# Patient Record
Sex: FEMALE | URBAN - METROPOLITAN AREA
[De-identification: names, ages, dates, MRNs, and addresses within clinical notes are randomized per-mention and may not be internally consistent; named-entity substitution may affect disease eponyms.]

---

## 2018-01-19 ENCOUNTER — OFFICE (OUTPATIENT)
Dept: URBAN - METROPOLITAN AREA CLINIC 66 | Facility: CLINIC | Age: 38
End: 2018-01-19

## 2018-01-19 VITALS
WEIGHT: 214 LBS | SYSTOLIC BLOOD PRESSURE: 127 MMHG | HEIGHT: 67 IN | DIASTOLIC BLOOD PRESSURE: 85 MMHG | TEMPERATURE: 97.8 F | HEART RATE: 90 BPM

## 2018-01-19 DIAGNOSIS — R74.8 ABNORMAL LEVELS OF OTHER SERUM ENZYMES: ICD-10-CM

## 2018-01-19 DIAGNOSIS — L29.9 PRURITUS, UNSPECIFIED: ICD-10-CM

## 2018-01-19 DIAGNOSIS — J40 BRONCHITIS, NOT SPECIFIED AS ACUTE OR CHRONIC: ICD-10-CM

## 2018-01-19 PROCEDURE — 99203 OFFICE O/P NEW LOW 30 MIN: CPT

## 2018-01-19 RX ORDER — AZITHROMYCIN 500 MG/1
500 TABLET, FILM COATED ORAL
Qty: 3 | Refills: 0 | Status: COMPLETED
Start: 2018-01-19 | End: 2018-08-17

## 2018-02-08 ENCOUNTER — OFFICE (OUTPATIENT)
Dept: URBAN - METROPOLITAN AREA CLINIC 66 | Facility: CLINIC | Age: 38
End: 2018-02-08

## 2018-02-08 VITALS
DIASTOLIC BLOOD PRESSURE: 84 MMHG | WEIGHT: 212 LBS | HEART RATE: 86 BPM | SYSTOLIC BLOOD PRESSURE: 133 MMHG | HEIGHT: 67 IN | TEMPERATURE: 98 F

## 2018-02-08 DIAGNOSIS — K76.0 FATTY (CHANGE OF) LIVER, NOT ELSEWHERE CLASSIFIED: ICD-10-CM

## 2018-02-08 DIAGNOSIS — L29.9 PRURITUS, UNSPECIFIED: ICD-10-CM

## 2018-02-08 DIAGNOSIS — E66.9 OBESITY, UNSPECIFIED: ICD-10-CM

## 2018-02-08 PROCEDURE — 99213 OFFICE O/P EST LOW 20 MIN: CPT

## 2018-08-17 ENCOUNTER — OFFICE (OUTPATIENT)
Dept: URBAN - METROPOLITAN AREA CLINIC 66 | Facility: CLINIC | Age: 38
End: 2018-08-17
Payer: COMMERCIAL

## 2018-08-17 VITALS
DIASTOLIC BLOOD PRESSURE: 80 MMHG | SYSTOLIC BLOOD PRESSURE: 118 MMHG | WEIGHT: 215 LBS | HEART RATE: 72 BPM | HEIGHT: 67 IN

## 2018-08-17 DIAGNOSIS — E66.9 OBESITY, UNSPECIFIED: ICD-10-CM

## 2018-08-17 DIAGNOSIS — R74.8 ABNORMAL LEVELS OF OTHER SERUM ENZYMES: ICD-10-CM

## 2018-08-17 PROCEDURE — 99213 OFFICE O/P EST LOW 20 MIN: CPT

## 2019-02-27 ENCOUNTER — OFFICE (OUTPATIENT)
Dept: URBAN - METROPOLITAN AREA CLINIC 66 | Facility: CLINIC | Age: 39
End: 2019-02-27

## 2019-02-27 VITALS
HEART RATE: 89 BPM | SYSTOLIC BLOOD PRESSURE: 108 MMHG | DIASTOLIC BLOOD PRESSURE: 75 MMHG | HEIGHT: 67 IN | WEIGHT: 224 LBS

## 2019-02-27 DIAGNOSIS — E66.9 OBESITY, UNSPECIFIED: ICD-10-CM

## 2019-02-27 DIAGNOSIS — R74.8 ABNORMAL LEVELS OF OTHER SERUM ENZYMES: ICD-10-CM

## 2019-02-27 DIAGNOSIS — R11.0 NAUSEA: ICD-10-CM

## 2019-02-27 DIAGNOSIS — R19.7 DIARRHEA, UNSPECIFIED: ICD-10-CM

## 2019-02-27 DIAGNOSIS — R14.0 ABDOMINAL DISTENSION (GASEOUS): ICD-10-CM

## 2019-02-27 PROCEDURE — 99213 OFFICE O/P EST LOW 20 MIN: CPT

## 2019-07-25 ENCOUNTER — OFFICE VISIT (OUTPATIENT)
Dept: BARIATRICS/WEIGHT MGMT | Facility: CLINIC | Age: 39
End: 2019-07-25

## 2019-07-25 ENCOUNTER — DOCUMENTATION (OUTPATIENT)
Dept: BARIATRICS/WEIGHT MGMT | Facility: CLINIC | Age: 39
End: 2019-07-25

## 2019-07-25 VITALS
TEMPERATURE: 97.9 F | HEIGHT: 68 IN | DIASTOLIC BLOOD PRESSURE: 78 MMHG | RESPIRATION RATE: 18 BRPM | BODY MASS INDEX: 34.78 KG/M2 | HEART RATE: 85 BPM | SYSTOLIC BLOOD PRESSURE: 118 MMHG | OXYGEN SATURATION: 99 % | WEIGHT: 229.5 LBS

## 2019-07-25 DIAGNOSIS — E66.9 OBESITY, CLASS II, BMI 35-39.9: ICD-10-CM

## 2019-07-25 DIAGNOSIS — I10 HYPERTENSION, UNSPECIFIED TYPE: Primary | ICD-10-CM

## 2019-07-25 DIAGNOSIS — E78.5 HYPERLIPIDEMIA, UNSPECIFIED HYPERLIPIDEMIA TYPE: ICD-10-CM

## 2019-07-25 DIAGNOSIS — K76.0 FATTY LIVER: ICD-10-CM

## 2019-07-25 DIAGNOSIS — R10.13 DYSPEPSIA: ICD-10-CM

## 2019-07-25 DIAGNOSIS — R53.83 FATIGUE, UNSPECIFIED TYPE: ICD-10-CM

## 2019-07-25 PROCEDURE — 99204 OFFICE O/P NEW MOD 45 MIN: CPT | Performed by: PHYSICIAN ASSISTANT

## 2019-07-25 RX ORDER — GABAPENTIN 300 MG/1
300 CAPSULE ORAL DAILY
Refills: 0 | COMMUNITY
Start: 2019-06-27 | End: 2022-11-07

## 2019-07-25 RX ORDER — MEDROXYPROGESTERONE ACETATE 150 MG/ML
INJECTION, SUSPENSION INTRAMUSCULAR
COMMUNITY
Start: 2019-06-06 | End: 2020-02-06 | Stop reason: HOSPADM

## 2019-07-25 RX ORDER — SIMVASTATIN 20 MG
20 TABLET ORAL NIGHTLY
Refills: 1 | COMMUNITY
Start: 2019-07-12

## 2019-07-25 RX ORDER — AMLODIPINE BESYLATE 5 MG/1
5 TABLET ORAL DAILY
Refills: 0 | COMMUNITY
Start: 2019-07-19 | End: 2021-03-10

## 2019-07-25 RX ORDER — SPIRONOLACTONE 50 MG/1
150 TABLET, FILM COATED ORAL 2 TIMES DAILY
COMMUNITY
End: 2021-03-10

## 2019-07-25 RX ORDER — NORETHINDRONE ACETATE AND ETHINYL ESTRADIOL AND FERROUS FUMARATE 1MG-20(21)
1 KIT ORAL DAILY
Refills: 3 | COMMUNITY
Start: 2019-07-10 | End: 2020-02-02

## 2019-07-26 LAB
ALBUMIN SERPL-MCNC: 4.4 G/DL (ref 3.5–5.5)
ALBUMIN/GLOB SERPL: 1.4 {RATIO} (ref 1.2–2.2)
ALP SERPL-CCNC: 78 IU/L (ref 39–117)
ALT SERPL-CCNC: 16 IU/L (ref 0–32)
AST SERPL-CCNC: 19 IU/L (ref 0–40)
BASOPHILS # BLD AUTO: 0 X10E3/UL (ref 0–0.2)
BASOPHILS NFR BLD AUTO: 0 %
BILIRUB SERPL-MCNC: 0.6 MG/DL (ref 0–1.2)
BUN SERPL-MCNC: 16 MG/DL (ref 6–20)
BUN/CREAT SERPL: 20 (ref 9–23)
CALCIUM SERPL-MCNC: 10 MG/DL (ref 8.7–10.2)
CHLORIDE SERPL-SCNC: 98 MMOL/L (ref 96–106)
CHOLEST SERPL-MCNC: 223 MG/DL (ref 100–199)
CO2 SERPL-SCNC: 22 MMOL/L (ref 20–29)
CREAT SERPL-MCNC: 0.81 MG/DL (ref 0.57–1)
EOSINOPHIL # BLD AUTO: 0.1 X10E3/UL (ref 0–0.4)
EOSINOPHIL NFR BLD AUTO: 1 %
ERYTHROCYTE [DISTWIDTH] IN BLOOD BY AUTOMATED COUNT: 13 % (ref 12.3–15.4)
GLOBULIN SER CALC-MCNC: 3.1 G/DL (ref 1.5–4.5)
GLUCOSE SERPL-MCNC: 81 MG/DL (ref 65–99)
HBA1C MFR BLD: 5.3 % (ref 4.8–5.6)
HCT VFR BLD AUTO: 42.4 % (ref 34–46.6)
HDLC SERPL-MCNC: 75 MG/DL
HGB BLD-MCNC: 14.1 G/DL (ref 11.1–15.9)
IMM GRANULOCYTES # BLD AUTO: 0 X10E3/UL (ref 0–0.1)
IMM GRANULOCYTES NFR BLD AUTO: 0 %
LDLC SERPL CALC-MCNC: 109 MG/DL (ref 0–99)
LYMPHOCYTES # BLD AUTO: 3.2 X10E3/UL (ref 0.7–3.1)
LYMPHOCYTES NFR BLD AUTO: 28 %
MCH RBC QN AUTO: 30.8 PG (ref 26.6–33)
MCHC RBC AUTO-ENTMCNC: 33.3 G/DL (ref 31.5–35.7)
MCV RBC AUTO: 93 FL (ref 79–97)
MONOCYTES # BLD AUTO: 0.7 X10E3/UL (ref 0.1–0.9)
MONOCYTES NFR BLD AUTO: 6 %
NEUTROPHILS # BLD AUTO: 7.1 X10E3/UL (ref 1.4–7)
NEUTROPHILS NFR BLD AUTO: 65 %
PLATELET # BLD AUTO: 414 X10E3/UL (ref 150–450)
POTASSIUM SERPL-SCNC: 4.8 MMOL/L (ref 3.5–5.2)
PROT SERPL-MCNC: 7.5 G/DL (ref 6–8.5)
RBC # BLD AUTO: 4.58 X10E6/UL (ref 3.77–5.28)
SODIUM SERPL-SCNC: 136 MMOL/L (ref 134–144)
TRIGL SERPL-MCNC: 197 MG/DL (ref 0–149)
TSH SERPL DL<=0.005 MIU/L-ACNC: 1.09 UIU/ML (ref 0.45–4.5)
VLDLC SERPL CALC-MCNC: 39 MG/DL (ref 5–40)
WBC # BLD AUTO: 11.2 X10E3/UL (ref 3.4–10.8)

## 2019-08-01 NOTE — PROGRESS NOTES
"Weight Loss Surgery  Presurgical Nutrition Assessment     Mariel Fox  07/25/2019  [id number]  [id number]  1980  female    Surgery desired: Sleeve Gastrectomy     Ht 171.5 cm (67.5\")    Wt 104 kg (229 lb 8 oz)   Past Medical History:   Diagnosis Date   • Anxiety and depression    • Chronic back pain     last steroid injection 2017   • Dyspepsia    • Dyspnea on exertion    • Endometriosis    • Fatigue    • Fatty liver     follows w/ GI ([place]), Mom w/ hx NUNES Cirrhosis, denies prior liver bx   • Hyperlipidemia    • Hypertension    • Melanoma (CMS/HCC) 2005    (R) forearm, resected, no subsequet tx, follows w/ derm   • MTHFR mutation (CMS/HCC)     hx miscarriage x 2, no hx DVT/PE, takes ASA81 mg qod   • Obesity    • PCOS (polycystic ovarian syndrome)     on Spironolactone   • Psoriatic arthritis (CMS/HCC)     follows w/ Rheumatology ([place]), on Enbrel/Gabapentin + prn Ibupfroen     Past Surgical History:   Procedure Laterality Date   • SALPINGECTOMY Left 2013    ectopic pregnancy   • TARSAL TUNNEL RELEASE Right 2015    (R) foot   • TONSILLECTOMY  1990     Allergies   Allergen Reactions   • Adhesive Tape Itching and Rash       Current Outpatient Medications:   •  amLODIPine (NORVASC) 5 MG tablet, Take 5 mg by mouth Daily., Disp: , Rfl: 0  •  ENBREL SURECLICK 50 MG/ML solution auto-injector, , Disp: , Rfl:   •  gabapentin (NEURONTIN) 300 MG capsule, Take 300 mg by mouth 3 (Three) Times a Day., Disp: , Rfl: 0  •  JUNEL FE 1/20 1-20 MG-MCG per tablet, Take 1 tablet by mouth Daily., Disp: , Rfl: 3  •  Liraglutide -Weight Management (SAXENDA) 18 MG/3ML solution pen-injector, , Disp: , Rfl:   •  Multiple Vitamins-Minerals (MULTIVITAMIN ADULT PO), Take  by mouth., Disp: , Rfl:   •  sertraline (ZOLOFT) 100 MG tablet, TAKE 1 & 1/2 TABLETS EVERY DAY, Disp: , Rfl: 2  •  simvastatin (ZOCOR) 20 MG tablet, Take 20 mg by mouth every night at bedtime., Disp: , Rfl: 1  •  spironolactone (ALDACTONE) 50 MG tablet, Take " 150 mg by mouth Daily., Disp: , Rfl:       Nutrition Assessment    Estimated energy needs: 1900    Estimated calories for weight loss: 1500    IBW (Pounds):  160      Excess body weight (Pounds): 69       Nutrition Recall  24 Hour recall: (B) (L) (D) -  Reviewed and discussed with patient       Exercise  intermittently      Education    Provided manual for Sleeve Gastrectomy and reviewed necessary vitamin and protein supplementation for surgery.     Provided follow-up options for support, including contact information for dietitians here, if desired.  Web-based support information and apps for smart phones and computers given.  Noted that monthly support group is offered at this clinic, and that support is associated with weight loss.    Recommend that team proceed with surgery and follow per protocol.      Nutrition Goals   Dietary Guidelines per manual  Protein goal:  grams per day     Exercise Goals  Continue current exercise routine   Add 15-30 minutes of activity per day as tolerated        Jazmyne Senior RD  07/25/2019  2:57 PM

## 2019-08-08 ENCOUNTER — CONSULT (OUTPATIENT)
Dept: CARDIOLOGY | Facility: CLINIC | Age: 39
End: 2019-08-08

## 2019-08-08 VITALS
WEIGHT: 231 LBS | HEIGHT: 67 IN | BODY MASS INDEX: 36.26 KG/M2 | HEART RATE: 75 BPM | DIASTOLIC BLOOD PRESSURE: 80 MMHG | SYSTOLIC BLOOD PRESSURE: 122 MMHG

## 2019-08-08 DIAGNOSIS — I10 ESSENTIAL HYPERTENSION: Primary | ICD-10-CM

## 2019-08-08 DIAGNOSIS — E78.2 MIXED HYPERLIPIDEMIA: ICD-10-CM

## 2019-08-08 PROCEDURE — 99243 OFF/OP CNSLTJ NEW/EST LOW 30: CPT | Performed by: PHYSICIAN ASSISTANT

## 2019-08-08 PROCEDURE — 93000 ELECTROCARDIOGRAM COMPLETE: CPT | Performed by: PHYSICIAN ASSISTANT

## 2019-08-08 RX ORDER — DICLOFENAC SODIUM 75 MG/1
75 TABLET, DELAYED RELEASE ORAL 2 TIMES DAILY
COMMUNITY
End: 2020-02-02

## 2019-08-08 RX ORDER — CETIRIZINE HYDROCHLORIDE 10 MG/1
10 TABLET ORAL DAILY
COMMUNITY
End: 2020-02-02

## 2019-08-08 NOTE — PROGRESS NOTES
[facility]  INITIAL OFFICE CONSULT      Mariel Fox  1980  PCP: Lauro Coronel APRN    SUBJECTIVE:   Mariel Fox is a 39 y.o. female seen for a consultation visit regarding the following:     Chief Complaint:   Chief Complaint   Patient presents with   • Pre-op Exam          Consultation is requested by LAURA Mart for evaluation of Pre-op Exam        History:  Pleasant 39-year-old female presents today for consultation regarding preop evaluation for gastric sleeve.  Patient reports is been doing well she has no chest pain or chest tightness suggesting his pectoris.  She reports that she has a history of hypertension dyslipidemia which is under control.  She denies any previous cardiac history.  She denies any heart failure symptoms such as orthopnea, PND, peripheral edema.  She states she tries exercise walking in her neighborhood and she said no symptoms during this time.  She denies any palpitations dizziness near syncope or syncope.  She is scheduled to undergo further evaluation and consideration for gastric sleeve.    Cardiac PMH: (Old records have been reviewed and summarized below)  1. Normal EKG      Past Medical History, Past Surgical History, Family history, Social History, and Medications were all reviewed with the patient today and updated as necessary.     Current Outpatient Medications   Medication Sig Dispense Refill   • amLODIPine (NORVASC) 5 MG tablet Take 5 mg by mouth Daily.  0   • cetirizine (zyrTEC) 10 MG tablet Take 10 mg by mouth Daily.     • diclofenac (VOLTAREN) 75 MG EC tablet Take 75 mg by mouth 2 (Two) Times a Day.     • ENBREL SURECLICK 50 MG/ML solution auto-injector      • gabapentin (NEURONTIN) 300 MG capsule Take 300 mg by mouth 3 (Three) Times a Day.  0   • JUNEL FE 1/20 1-20 MG-MCG per tablet Take 1 tablet by mouth Daily.  3   • Liraglutide -Weight Management (SAXENDA) 18 MG/3ML solution pen-injector      • Multiple  Vitamins-Minerals (MULTIVITAMIN ADULT PO) Take  by mouth.     • sertraline (ZOLOFT) 100 MG tablet TAKE 1 & 1/2 TABLETS EVERY DAY  2   • simvastatin (ZOCOR) 20 MG tablet Take 20 mg by mouth every night at bedtime.  1   • spironolactone (ALDACTONE) 50 MG tablet Take 150 mg by mouth Daily.       No current facility-administered medications for this visit.      Allergies   Allergen Reactions   • Adhesive Tape Itching and Rash         Past Medical History:   Diagnosis Date   • Anxiety and depression    • Chronic back pain     last steroid injection 2017   • Dyspepsia    • Dyspnea on exertion    • Endometriosis    • Fatigue    • Fatty liver     follows w/ GI ([place]), Mom w/ hx NUNES Cirrhosis, denies prior liver bx   • Hyperlipidemia    • Hypertension    • Melanoma (CMS/HCC) 2005    (R) forearm, resected, no subsequet tx, follows w/ derm   • Melanoma (CMS/HCC)    • MTHFR mutation (CMS/HCC)     hx miscarriage x 2, no hx DVT/PE, takes ASA81 mg qod   • Obesity    • PCOS (polycystic ovarian syndrome)     on Spironolactone   • Psoriatic arthritis (CMS/HCC)     follows w/ Rheumatology ([place]), on Enbrel/Gabapentin + prn Ibupfroen     Past Surgical History:   Procedure Laterality Date   • SALPINGECTOMY Left 2013    ectopic pregnancy   • TARSAL TUNNEL RELEASE Right 2015    (R) foot   • TONSILLECTOMY  1990     Family History   Problem Relation Age of Onset   • Hypertension Mother    • Melanoma Mother 45   • Cirrhosis Mother         NUNES   • Hypertension Father    • Squamous cell carcinoma Father 60   • Hypertension Brother    • Stroke Maternal Grandmother    • Heart attack Maternal Grandfather    • Lung cancer Paternal Aunt      Social History     Tobacco Use   • Smoking status: Never Smoker   • Smokeless tobacco: Never Used   Substance Use Topics   • Alcohol use: No     Frequency: Never       ROS:  Review of Symptoms:  General: no recent weight loss/gain, weakness or fatigue  Skin: no rashes, lumps, or other skin  "changes  HEENT: no dizziness, lightheadedness, or vision changes  Respiratory: no cough or hemoptysis  Cardiovascular: no palpitations, and tachycardia  Gastrointestinal: no black/tarry stools or diarrhea  Urinary: no change in frequency or urgency  Peripheral Vascular: no claudication or leg cramps  Musculoskeletal: no muscle or joint pain/stiffness  Psychiatric: no depression or excessive stress  Neurological: no sensory or motor loss, no syncope  Hematologic: no anemia, easy bruising or bleeding  Endocrine: no thyroid problems, nor heat or cold intolerance         PHYSICAL EXAM:   /80 (BP Location: Right arm)   Pulse 75   Ht 170.2 cm (67\")   Wt 105 kg (231 lb)   BMI 36.18 kg/m²      Wt Readings from Last 5 Encounters:   08/08/19 105 kg (231 lb)   07/25/19 104 kg (229 lb 8 oz)     BP Readings from Last 5 Encounters:   08/08/19 122/80   07/25/19 118/78       General-Well Nourished, Well developed  Eyes - PERRLA  Neck- supple, No mass  CV- regular rate and rhythm, no MRG  Lung- clear bilaterally  Abd- soft, +BS  Musc/skel - Norm strength and range of motion  Skin- warm and dry  Neuro - Alert & Oriented x 3, appropriate mood.    Medical problems and test results were reviewed with the patient today.     Results for orders placed or performed in visit on 07/25/19   Comprehensive Metabolic Panel   Result Value Ref Range    Glucose 81 65 - 99 mg/dL    BUN 16 6 - 20 mg/dL    Creatinine 0.81 0.57 - 1.00 mg/dL    eGFR Non African Am 92 >59 mL/min/1.73    eGFR African Am 107 >59 mL/min/1.73    BUN/Creatinine Ratio 20 9 - 23    Sodium 136 134 - 144 mmol/L    Potassium 4.8 3.5 - 5.2 mmol/L    Chloride 98 96 - 106 mmol/L    Total CO2 22 20 - 29 mmol/L    Calcium 10.0 8.7 - 10.2 mg/dL    Total Protein 7.5 6.0 - 8.5 g/dL    Albumin 4.4 3.5 - 5.5 g/dL    Globulin 3.1 1.5 - 4.5 g/dL    A/G Ratio 1.4 1.2 - 2.2    Total Bilirubin 0.6 0.0 - 1.2 mg/dL    Alkaline Phosphatase 78 39 - 117 IU/L    AST (SGOT) 19 0 - 40 IU/L    " ALT (SGPT) 16 0 - 32 IU/L   Hemoglobin A1c   Result Value Ref Range    Hemoglobin A1C 5.3 4.8 - 5.6 %   Lipid Panel   Result Value Ref Range    Total Cholesterol 223 (H) 100 - 199 mg/dL    Triglycerides 197 (H) 0 - 149 mg/dL    HDL Cholesterol 75 >39 mg/dL    VLDL Cholesterol 39 5 - 40 mg/dL    LDL Cholesterol  109 (H) 0 - 99 mg/dL   TSH   Result Value Ref Range    TSH 1.090 0.450 - 4.500 uIU/mL   CBC & Differential   Result Value Ref Range    WBC 11.2 (H) 3.4 - 10.8 x10E3/uL    RBC 4.58 3.77 - 5.28 x10E6/uL    Hemoglobin 14.1 11.1 - 15.9 g/dL    Hematocrit 42.4 34.0 - 46.6 %    MCV 93 79 - 97 fL    MCH 30.8 26.6 - 33.0 pg    MCHC 33.3 31.5 - 35.7 g/dL    RDW 13.0 12.3 - 15.4 %    Platelets 414 150 - 450 x10E3/uL    Neutrophil Rel % 65 Not Estab. %    Lymphocyte Rel % 28 Not Estab. %    Monocyte Rel % 6 Not Estab. %    Eosinophil Rel % 1 Not Estab. %    Basophil Rel % 0 Not Estab. %    Neutrophils Absolute 7.1 (H) 1.4 - 7.0 x10E3/uL    Lymphocytes Absolute 3.2 (H) 0.7 - 3.1 x10E3/uL    Monocytes Absolute 0.7 0.1 - 0.9 x10E3/uL    Eosinophils Absolute 0.1 0.0 - 0.4 x10E3/uL    Basophils Absolute 0.0 0.0 - 0.2 x10E3/uL    Immature Granulocyte Rel % 0 Not Estab. %    Immature Grans Absolute 0.0 0.0 - 0.1 x10E3/uL         Lab Results   Component Value Date    HDL 75 07/25/2019     (H) 07/25/2019    VLDL 39 07/25/2019       EKG:  (EKG/Tracing has been independently visualized by me and summarized below)      ECG 12 Lead  Date/Time: 8/8/2019 3:25 PM  Performed by: Jose Acosta PA  Authorized by: Jose Acosta PA   Comparison: not compared with previous ECG   Rhythm: sinus rhythm  Rate: normal  Conduction: conduction normal  ST Segments: ST segments normal  QRS axis: normal  Other: no other findings    Clinical impression: normal ECG          ASSESSMENT   1. Pre op evaluation:   Plan for upcoming gastric sleeve surgery.  The patient has no symptoms of angina or heart failure symptoms.  She has an EKG is  normal with no significant abnormalities.  2. HTN:  Controlled on Norvasc. Limit sodium, diet and exercise.   3. HLD:  Statin. Continue diet with target  or less.   4. Obesity.     PLAN  · The patient has no symptoms of angina or heart failure with normal EKG today.  She will continue focus on risk factors for coronary disease including control of hypertension dyslipidemia.  She is considered standard cardia vascular risk to pursue gastric sleeve surgery.         Cardiology/Electrophysiology  08/08/19  3:16 PM  Will Dave RODRIGUEZ

## 2019-10-09 ENCOUNTER — OFFICE (OUTPATIENT)
Dept: URBAN - METROPOLITAN AREA CLINIC 66 | Facility: CLINIC | Age: 39
End: 2019-10-09

## 2019-10-09 VITALS
HEIGHT: 67 IN | SYSTOLIC BLOOD PRESSURE: 119 MMHG | HEART RATE: 73 BPM | DIASTOLIC BLOOD PRESSURE: 81 MMHG | WEIGHT: 242 LBS

## 2019-10-09 DIAGNOSIS — R14.0 ABDOMINAL DISTENSION (GASEOUS): ICD-10-CM

## 2019-10-09 DIAGNOSIS — E66.9 OBESITY, UNSPECIFIED: ICD-10-CM

## 2019-10-09 DIAGNOSIS — I10 ESSENTIAL (PRIMARY) HYPERTENSION: ICD-10-CM

## 2019-10-09 DIAGNOSIS — K76.0 FATTY (CHANGE OF) LIVER, NOT ELSEWHERE CLASSIFIED: ICD-10-CM

## 2019-10-09 PROCEDURE — 99214 OFFICE O/P EST MOD 30 MIN: CPT

## 2020-01-06 DIAGNOSIS — R53.83 FATIGUE, UNSPECIFIED TYPE: Primary | ICD-10-CM

## 2020-01-06 DIAGNOSIS — R06.00 DYSPNEA, UNSPECIFIED TYPE: ICD-10-CM

## 2020-01-06 DIAGNOSIS — R53.83 FATIGUE, UNSPECIFIED TYPE: ICD-10-CM

## 2020-01-13 ENCOUNTER — CONSULT (OUTPATIENT)
Dept: BARIATRICS/WEIGHT MGMT | Facility: CLINIC | Age: 40
End: 2020-01-13

## 2020-01-13 VITALS
BODY MASS INDEX: 37.81 KG/M2 | OXYGEN SATURATION: 99 % | HEART RATE: 90 BPM | RESPIRATION RATE: 18 BRPM | HEIGHT: 68 IN | SYSTOLIC BLOOD PRESSURE: 122 MMHG | WEIGHT: 249.5 LBS | TEMPERATURE: 96.6 F | DIASTOLIC BLOOD PRESSURE: 78 MMHG

## 2020-01-13 DIAGNOSIS — R53.83 FATIGUE, UNSPECIFIED TYPE: Primary | ICD-10-CM

## 2020-01-13 DIAGNOSIS — K76.0 FATTY LIVER: ICD-10-CM

## 2020-01-13 DIAGNOSIS — I10 HYPERTENSION, UNSPECIFIED TYPE: ICD-10-CM

## 2020-01-13 DIAGNOSIS — E66.9 OBESITY, CLASS II, BMI 35-39.9: ICD-10-CM

## 2020-01-13 DIAGNOSIS — E78.5 HYPERLIPIDEMIA, UNSPECIFIED HYPERLIPIDEMIA TYPE: ICD-10-CM

## 2020-01-13 PROBLEM — E66.812 OBESITY, CLASS II, BMI 35-39.9: Status: ACTIVE | Noted: 2020-01-13

## 2020-01-13 PROCEDURE — 99215 OFFICE O/P EST HI 40 MIN: CPT | Performed by: SURGERY

## 2020-01-13 RX ORDER — SCOLOPAMINE TRANSDERMAL SYSTEM 1 MG/1
1 PATCH, EXTENDED RELEASE TRANSDERMAL ONCE
Status: CANCELLED | OUTPATIENT
Start: 2020-01-13 | End: 2020-01-13

## 2020-01-13 RX ORDER — ACETAMINOPHEN 500 MG
1000 TABLET ORAL ONCE
Status: CANCELLED | OUTPATIENT
Start: 2020-01-13 | End: 2020-01-13

## 2020-01-13 RX ORDER — CHLORHEXIDINE GLUCONATE 0.12 MG/ML
15 RINSE ORAL
Status: CANCELLED | OUTPATIENT
Start: 2020-01-13 | End: 2020-01-13

## 2020-01-13 RX ORDER — PANTOPRAZOLE SODIUM 40 MG/10ML
40 INJECTION, POWDER, LYOPHILIZED, FOR SOLUTION INTRAVENOUS ONCE
Status: CANCELLED | OUTPATIENT
Start: 2020-01-13 | End: 2020-01-13

## 2020-01-13 RX ORDER — GABAPENTIN 100 MG/1
600 CAPSULE ORAL ONCE
Status: CANCELLED | OUTPATIENT
Start: 2020-01-13 | End: 2020-01-13

## 2020-01-13 RX ORDER — SODIUM CHLORIDE 9 MG/ML
150 INJECTION, SOLUTION INTRAVENOUS CONTINUOUS
Status: CANCELLED | OUTPATIENT
Start: 2020-01-13

## 2020-01-13 NOTE — H&P
"[facility] BARIATRIC [facility]  [address]    [facility] [address] [phone]      Patient  Name:  Mariel Fox  :  1980      Date of Visit: 2020      Chief Complaint:  weight gain; unable to maintain weight loss    History of Present Illness:  Mariel Fox is a 39 y.o. female who presents today for evaluation, education and consultation regarding weight loss surgery.     Patient has been overweight for many years, with numerous failed dietary/weight loss attempts.  She now has obesity related comorbidities of HTN, HLD, fatty liver, back pain, PCOS and as such has decided to pursue weight loss surgery.    Psoriatic arthritis:  Off immunomodulators.  Melanoma in-situ: 14 years ago, s/p WLE on right arm.     Wants to go to [place] [facility] 3 weeks after surgery.  Aware of risks of VTE, leak in the first 6 weeks.    No personal or family hx of VTE or clotting d/o.  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  No liver, lung, heart, or renal disease    Review of data:    GURU: gabapentin  CBC: nl  CMP: nl  HP negative  Cardiac clearance: \"standard\" cardiac risk   Rheumatology statement: aware of her need to be off NSAIDS/immunomodulators/steroids  PCP statement re: MTHFR: 14 days Lovenox bid post op.  EKG: nl  CXR: nl      Last tobacco: n/a  Last NSAIDs: diclofenac 19.  Last ASA: 19.  Last steroids: 19 for hives on [place], no Enbrel x 1 week  Last hormones: Junel OCP, stopped 19.      Past Medical History:   Diagnosis Date   • Anxiety and depression    • Chronic back pain     last steroid injection    • Dyspepsia    • Dyspnea on exertion    • Endometriosis    • Fatigue    • Fatty liver     follows w/ GI ([place]), Mom w/ hx NUNES Cirrhosis, denies prior liver bx   • Hyperlipidemia    • Hypertension    • Melanoma in situ (CMS/HCC)     (R) forearm, resected, no subsequet tx, follows w/ derm   • MTHFR mutation (CMS/HCC) "     hx miscarriage x 2, no hx DVT/PE, takes ASA81 mg qod   • Obesity    • PCOS (polycystic ovarian syndrome)     on Spironolactone   • Psoriatic arthritis (CMS/HCC)     follows w/ Rheumatology ([place]), on Enbrel/Gabapentin + prn Ibupfroen     Past Surgical History:   Procedure Laterality Date   • D&C HYSTEROSCOPY      x 5   • SALPINGECTOMY Left 2013    ectopic pregnancy   • SKIN SURGERY      wide local excision right forearm melanoma in situ, also another for dysplastic nevus.   • TARSAL TUNNEL RELEASE Right 2015    (R) foot   • TONSILLECTOMY  1990       Allergies   Allergen Reactions   • Adhesive Tape Itching and Rash       Current Outpatient Medications:   •  amLODIPine (NORVASC) 5 MG tablet, Take 5 mg by mouth Daily., Disp: , Rfl: 0  •  ENBREL SURECLICK 50 MG/ML solution auto-injector, , Disp: , Rfl:   •  gabapentin (NEURONTIN) 300 MG capsule, Take 300 mg by mouth 3 (Three) Times a Day., Disp: , Rfl: 0  •  Multiple Vitamins-Minerals (MULTIVITAMIN ADULT PO), Take  by mouth., Disp: , Rfl:   •  sertraline (ZOLOFT) 100 MG tablet, TAKE 1 & 1/2 TABLETS EVERY DAY, Disp: , Rfl: 2  •  simvastatin (ZOCOR) 20 MG tablet, Take 20 mg by mouth every night at bedtime., Disp: , Rfl: 1  •  spironolactone (ALDACTONE) 50 MG tablet, Take 150 mg by mouth Daily., Disp: , Rfl:   •  cetirizine (zyrTEC) 10 MG tablet, Take 10 mg by mouth Daily., Disp: , Rfl:   •  diclofenac (VOLTAREN) 75 MG EC tablet, Take 75 mg by mouth 2 (Two) Times a Day., Disp: , Rfl:   •  JUNEL FE 1/20 1-20 MG-MCG per tablet, Take 1 tablet by mouth Daily., Disp: , Rfl: 3  •  Liraglutide -Weight Management (SAXENDA) 18 MG/3ML solution pen-injector, , Disp: , Rfl:     Social History     Socioeconomic History   • Marital status:      Spouse name: Not on file   • Number of children: Not on file   • Years of education: Not on file   • Highest education level: Not on file   Tobacco Use   • Smoking status: Never Smoker   • Smokeless tobacco: Never Used    Substance and Sexual Activity   • Alcohol use: No     Frequency: Never   • Drug use: No   Social History Narrative    Living in [place], [place] w/  and 4 children.  [facility] - [facility].  She is an occupational therapist and owner.       Family History   Problem Relation Age of Onset   • Hypertension Mother    • Melanoma Mother 45   • Cirrhosis Mother         NUNES   • Hypertension Father    • Squamous cell carcinoma Father 60   • Hypertension Brother    • Stroke Maternal Grandmother    • Heart attack Maternal Grandfather    • Lung cancer Paternal Aunt        Review of Systems   Constitutional: Positive for fatigue and unexpected weight gain. Negative for chills, diaphoresis, fever and unexpected weight loss.   HENT: Negative for congestion and facial swelling.    Eyes: Negative for blurred vision, double vision and discharge.   Respiratory: Negative for chest tightness, shortness of breath and stridor.    Cardiovascular: Negative for chest pain, palpitations and leg swelling.   Gastrointestinal: Negative for blood in stool.   Endocrine: Negative for polydipsia.   Genitourinary: Negative for hematuria.   Musculoskeletal: Positive for arthralgias.   Skin: Negative for color change.   Allergic/Immunologic: Negative for immunocompromised state.   Neurological: Negative for confusion.   Psychiatric/Behavioral: Negative for self-injury.       Physical Exam:  Vital Signs:  Weight: 113 kg (249 lb 8 oz)   Body mass index is 38.5 kg/m².  Temp: 96.6 °F (35.9 °C)   Heart Rate: 90   BP: 122/78     Physical Exam   Constitutional: She is oriented to person, place, and time. She appears well-developed and well-nourished. No distress.   HENT:   Head: Normocephalic and atraumatic.   Mouth/Throat: No oropharyngeal exudate.   Eyes: Pupils are equal, round, and reactive to light. Conjunctivae and EOM are normal.   Cardiovascular: Normal rate and regular rhythm.   Pulmonary/Chest: Effort normal  and breath sounds normal. No respiratory distress.   Abdominal: Soft. She exhibits no distension.   A few lap scars.  Carries most of weight on lower body, abdomen relatively spared   Neurological: She is alert and oriented to person, place, and time. No cranial nerve deficit.   Skin: Skin is warm and dry. She is not diaphoretic. No pallor.   Psychiatric: She has a normal mood and affect. Her behavior is normal. Thought content normal.       Patient Active Problem List   Diagnosis   • Fatigue   • Dyspepsia   • Dyspnea on exertion   • Obesity   • Hypertension   • Hyperlipidemia   • Psoriatic arthritis (CMS/HCC)   • Chronic back pain   • PCOS (polycystic ovarian syndrome)   • Endometriosis   • Anxiety and depression   • MTHFR mutation (CMS/HCC)   • Fatty liver   • Melanoma (CMS/HCC)       Assessment:    Mariel Fox is a 39 y.o. year old female with medically complicated obesity.    Weight loss surgery is deemed medically necessary given the following obesity related comorbidities including HTN, HLD, fatty liver, back pain, PCOS with current Weight: 113 kg (249 lb 8 oz) and Body mass index is 38.5 kg/m²..    Patient is aware that surgery is a tool, and that weight loss is not guaranteed but only seen in the context of appropriate use, follow up and exercise.    The patient was present for an approximately a 2.5 hour discussion of the purpose of weight loss surgery, how WLS is a tool to assist in achieving weight loss goals, the most common complications and how best to avoid them, and the strategies for short and long term weight loss.  Ample opportunity to discuss questions was available both in group and during the time of individual examination.    I reviewed all available preop labs, Xrays, tests, clearances, etc and signed off on these in the record.  All of this in addition to the patient's unique history and exam has been taken into consideration in determining their appropriate candidacy for weight loss  "surgery.    Complications  of laparoscopic/possible robotic gastric sleeve were discussed. The patient is well aware of the potential complications of surgery that include but not limited to bleeding, infections, deep venous thrombosis, pulmonary embolism, pulmonary complications such as pneumonia, cardiac events, hernias, small bowel obstruction, damage to the spleen or other organs, bowel injury, disfiguring scars, failure to lose weight, need for additional surgery, conversion to an open procedure, and death. Patient is also aware of complications which apply in this particular procedure that can include but are not limited to a \"leak\" at the staple line which in some instances may require conversion to gastric bypass.    The patient is aware if a hiatal hernia is encountered, it likely will be repaired.  R/B/A Rx to hiatal hernia repair were discussed as outlined in our long consent form.  Briefly risks in addition to those for LSG include recurrent hernia, YA, dysphagia, esophageal injury, pneumothorax, injury to the vagus nerves, injury to the thoracic duct, aorta or vena cava.    Greater than 3 minutes was spent with the patient discussing avoiding all tobacco products and second hand smoke at least 2 weeks pre-operatively and 6 weeks post-operatively to minimize the risk of sleeve leak.  This included discussing the importance of avoiding even secondhand smoke as the risk of leak is increased.  Examples discussed:  I made it very clear that the patient understands they should avoid even riding in a car where someone has previously smoked in the last 2 weeks, living in a house where someone smokes (even if it's in a separate room/patio/attached garage, etc.) we discussed that they should not have a conversation with a group of people who are smoking even if it's outside.  They can be around wood burning fires and barbecue.  I told them I do not know if marijuana has a same effects but my overall " recommendation is to avoid it for 2 weeks prior in 6 weeks after surgery.  They also are aware that nicotine may also increase the risk of leak and I strongly encouraged him to avoid that as well for 2 weeks prior in 6 weeks after surgery.    Discussed the risks, benefits and alternative therapies at great length as outlined in our extensive consent forms, consent videos, and educational teaching process under the direction of the center's .    A copy of the patient's signed informed consent is on file.    Plan:  Laparoscopic sleeve gastrectomy [facility].      R/B/A Rx discussed to postop anticoagulation incl but not limited to bleeding, drug reaction, venothromboembolic events, etc. and she is agreeable to taking 3 weeks of Lovenox 40 mg daily.    MDM high:  Elective procedure with the following risk factors: morbid obesity  4+ chronic medical problems reviewed.      Shayy Ojeda MD

## 2020-01-13 NOTE — PROGRESS NOTES
"[facility] BARIATRIC SURGERY  [address]    [facility] [id number] [phone]      Patient  Name:  Mariel Fox  :  1980      Date of Visit: 2020      Chief Complaint:  weight gain; unable to maintain weight loss    History of Present Illness:  Mariel Fox is a 39 y.o. female who presents today for evaluation, education and consultation regarding weight loss surgery.     Patient has been overweight for many years, with numerous failed dietary/weight loss attempts.  She now has obesity related comorbidities of HTN, HLD, fatty liver, back pain, PCOS and as such has decided to pursue weight loss surgery.    Psoriatic arthritis:  Off immunomodulators.  Melanoma in-situ: 14 years ago, s/p WLE on right arm.     Wants to go to [facility] 3 weeks after surgery.  Aware of risks of VTE, leak in the first 6 weeks.    No personal or family hx of VTE or clotting d/o.  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  No liver, lung, heart, or renal disease    Review of data:    GURU: gabapentin  CBC: nl  CMP: nl  HP negative  Cardiac clearance: \"standard\" cardiac risk   Rheumatology statement: aware of her need to be off NSAIDS/immunomodulators/steroids  PCP statement re: MTHFR: 14 days Lovenox bid post op.  EKG: nl  CXR: nl      Last tobacco: n/a  Last NSAIDs: diclofenac 19.  Last ASA: 19.  Last steroids: 19 for hives on Eastern, no Enbrel x 1 week  Last hormones: Junel OCP, stopped 19.      Past Medical History:   Diagnosis Date   • Anxiety and depression    • Chronic back pain     last steroid injection    • Dyspepsia    • Dyspnea on exertion    • Endometriosis    • Fatigue    • Fatty liver     follows w/ GI ([place]), Mom w/ hx NUNES Cirrhosis, denies prior liver bx   • Hyperlipidemia    • Hypertension    • Melanoma in situ (CMS/HCC)     (R) forearm, resected, no subsequet tx, follows w/ derm   • MTHFR mutation (CMS/HCC) "     hx miscarriage x 2, no hx DVT/PE, takes ASA81 mg qod   • Obesity    • PCOS (polycystic ovarian syndrome)     on Spironolactone   • Psoriatic arthritis (CMS/HCC)     follows w/ Rheumatology ([place]), on Enbrel/Gabapentin + prn Ibupfroen     Past Surgical History:   Procedure Laterality Date   • D&C HYSTEROSCOPY      x 5   • SALPINGECTOMY Left 2013    ectopic pregnancy   • SKIN SURGERY      wide local excision right forearm melanoma in situ, also another for dysplastic nevus.   • TARSAL TUNNEL RELEASE Right 2015    (R) foot   • TONSILLECTOMY  1990       Allergies   Allergen Reactions   • Adhesive Tape Itching and Rash       Current Outpatient Medications:   •  amLODIPine (NORVASC) 5 MG tablet, Take 5 mg by mouth Daily., Disp: , Rfl: 0  •  ENBREL SURECLICK 50 MG/ML solution auto-injector, , Disp: , Rfl:   •  gabapentin (NEURONTIN) 300 MG capsule, Take 300 mg by mouth 3 (Three) Times a Day., Disp: , Rfl: 0  •  Multiple Vitamins-Minerals (MULTIVITAMIN ADULT PO), Take  by mouth., Disp: , Rfl:   •  sertraline (ZOLOFT) 100 MG tablet, TAKE 1 & 1/2 TABLETS EVERY DAY, Disp: , Rfl: 2  •  simvastatin (ZOCOR) 20 MG tablet, Take 20 mg by mouth every night at bedtime., Disp: , Rfl: 1  •  spironolactone (ALDACTONE) 50 MG tablet, Take 150 mg by mouth Daily., Disp: , Rfl:   •  cetirizine (zyrTEC) 10 MG tablet, Take 10 mg by mouth Daily., Disp: , Rfl:   •  diclofenac (VOLTAREN) 75 MG EC tablet, Take 75 mg by mouth 2 (Two) Times a Day., Disp: , Rfl:   •  JUNEL FE 1/20 1-20 MG-MCG per tablet, Take 1 tablet by mouth Daily., Disp: , Rfl: 3  •  Liraglutide -Weight Management (SAXENDA) 18 MG/3ML solution pen-injector, , Disp: , Rfl:     Social History     Socioeconomic History   • Marital status:      Spouse name: Not on file   • Number of children: Not on file   • Years of education: Not on file   • Highest education level: Not on file   Tobacco Use   • Smoking status: Never Smoker   • Smokeless tobacco: Never Used    Substance and Sexual Activity   • Alcohol use: No     Frequency: Never   • Drug use: No   Social History Narrative    Living in [place], [place] w/  and 4 children.  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  She is an occupational therapist and owner.       Family History   Problem Relation Age of Onset   • Hypertension Mother    • Melanoma Mother 45   • Cirrhosis Mother         NUNES   • Hypertension Father    • Squamous cell carcinoma Father 60   • Hypertension Brother    • Stroke Maternal Grandmother    • Heart attack Maternal Grandfather    • Lung cancer Paternal Aunt        Review of Systems   Constitutional: Positive for fatigue and unexpected weight gain. Negative for chills, diaphoresis, fever and unexpected weight loss.   HENT: Negative for congestion and facial swelling.    Eyes: Negative for blurred vision, double vision and discharge.   Respiratory: Negative for chest tightness, shortness of breath and stridor.    Cardiovascular: Negative for chest pain, palpitations and leg swelling.   Gastrointestinal: Negative for blood in stool.   Endocrine: Negative for polydipsia.   Genitourinary: Negative for hematuria.   Musculoskeletal: Positive for arthralgias.   Skin: Negative for color change.   Allergic/Immunologic: Negative for immunocompromised state.   Neurological: Negative for confusion.   Psychiatric/Behavioral: Negative for self-injury.       Physical Exam:  Vital Signs:  Weight: 113 kg (249 lb 8 oz)   Body mass index is 38.5 kg/m².  Temp: 96.6 °F (35.9 °C)   Heart Rate: 90   BP: 122/78     Physical Exam   Constitutional: She is oriented to person, place, and time. She appears well-developed and well-nourished. No distress.   HENT:   Head: Normocephalic and atraumatic.   Mouth/Throat: No oropharyngeal exudate.   Eyes: Pupils are equal, round, and reactive to light. Conjunctivae and EOM are normal.   Cardiovascular: Normal rate and regular rhythm.   Pulmonary/Chest: Effort normal  and breath sounds normal. No respiratory distress.   Abdominal: Soft. She exhibits no distension.   A few lap scars.  Carries most of weight on lower body, abdomen relatively spared   Neurological: She is alert and oriented to person, place, and time. No cranial nerve deficit.   Skin: Skin is warm and dry. She is not diaphoretic. No pallor.   Psychiatric: She has a normal mood and affect. Her behavior is normal. Thought content normal.       Patient Active Problem List   Diagnosis   • Fatigue   • Dyspepsia   • Dyspnea on exertion   • Obesity   • Hypertension   • Hyperlipidemia   • Psoriatic arthritis (CMS/HCC)   • Chronic back pain   • PCOS (polycystic ovarian syndrome)   • Endometriosis   • Anxiety and depression   • MTHFR mutation (CMS/HCC)   • Fatty liver   • Melanoma (CMS/HCC)       Assessment:    Mariel Fox is a 39 y.o. year old female with medically complicated obesity.    Weight loss surgery is deemed medically necessary given the following obesity related comorbidities including HTN, HLD, fatty liver, back pain, PCOS with current Weight: 113 kg (249 lb 8 oz) and Body mass index is 38.5 kg/m²..    Patient is aware that surgery is a tool, and that weight loss is not guaranteed but only seen in the context of appropriate use, follow up and exercise.    The patient was present for an approximately a 2.5 hour discussion of the purpose of weight loss surgery, how WLS is a tool to assist in achieving weight loss goals, the most common complications and how best to avoid them, and the strategies for short and long term weight loss.  Ample opportunity to discuss questions was available both in group and during the time of individual examination.    I reviewed all available preop labs, Xrays, tests, clearances, etc and signed off on these in the record.  All of this in addition to the patient's unique history and exam has been taken into consideration in determining their appropriate candidacy for weight loss  "surgery.    Complications  of laparoscopic/possible robotic gastric sleeve were discussed. The patient is well aware of the potential complications of surgery that include but not limited to bleeding, infections, deep venous thrombosis, pulmonary embolism, pulmonary complications such as pneumonia, cardiac events, hernias, small bowel obstruction, damage to the spleen or other organs, bowel injury, disfiguring scars, failure to lose weight, need for additional surgery, conversion to an open procedure, and death. Patient is also aware of complications which apply in this particular procedure that can include but are not limited to a \"leak\" at the staple line which in some instances may require conversion to gastric bypass.    The patient is aware if a hiatal hernia is encountered, it likely will be repaired.  R/B/A Rx to hiatal hernia repair were discussed as outlined in our long consent form.  Briefly risks in addition to those for LSG include recurrent hernia, YA, dysphagia, esophageal injury, pneumothorax, injury to the vagus nerves, injury to the thoracic duct, aorta or vena cava.    Greater than 3 minutes was spent with the patient discussing avoiding all tobacco products and second hand smoke at least 2 weeks pre-operatively and 6 weeks post-operatively to minimize the risk of sleeve leak.  This included discussing the importance of avoiding even secondhand smoke as the risk of leak is increased.  Examples discussed:  I made it very clear that the patient understands they should avoid even riding in a car where someone has previously smoked in the last 2 weeks, living in a house where someone smokes (even if it's in a separate room/patio/attached garage, etc.) we discussed that they should not have a conversation with a group of people who are smoking even if it's outside.  They can be around wood burning fires and barbecue.  I told them I do not know if marijuana has a same effects but my overall " recommendation is to avoid it for 2 weeks prior in 6 weeks after surgery.  They also are aware that nicotine may also increase the risk of leak and I strongly encouraged him to avoid that as well for 2 weeks prior in 6 weeks after surgery.    Discussed the risks, benefits and alternative therapies at great length as outlined in our extensive consent forms, consent videos, and educational teaching process under the direction of the center's .    A copy of the patient's signed informed consent is on file.    Plan:  Laparoscopic sleeve gastrectomy [facility].      R/B/A Rx discussed to postop anticoagulation incl but not limited to bleeding, drug reaction, venothromboembolic events, etc. and she is agreeable to taking 3 weeks of Lovenox 40 mg daily.    MDM high:  Elective procedure with the following risk factors: morbid obesity  4+ chronic medical problems reviewed.      Shayy Ojeda MD

## 2020-02-02 ENCOUNTER — APPOINTMENT (OUTPATIENT)
Dept: PREADMISSION TESTING | Facility: HOSPITAL | Age: 40
End: 2020-02-02

## 2020-02-02 LAB
DEPRECATED RDW RBC AUTO: 46.5 FL (ref 37–54)
ERYTHROCYTE [DISTWIDTH] IN BLOOD BY AUTOMATED COUNT: 13.2 % (ref 12.3–15.4)
HBA1C MFR BLD: 5.4 % (ref 4.8–5.6)
HCT VFR BLD AUTO: 40.7 % (ref 34–46.6)
HGB BLD-MCNC: 13.5 G/DL (ref 12–15.9)
MCH RBC QN AUTO: 31.5 PG (ref 26.6–33)
MCHC RBC AUTO-ENTMCNC: 33.2 G/DL (ref 31.5–35.7)
MCV RBC AUTO: 95.1 FL (ref 79–97)
PLATELET # BLD AUTO: 385 10*3/MM3 (ref 140–450)
PMV BLD AUTO: 9.2 FL (ref 6–12)
POTASSIUM BLD-SCNC: 4.2 MMOL/L (ref 3.5–5.2)
RBC # BLD AUTO: 4.28 10*6/MM3 (ref 3.77–5.28)
WBC NRBC COR # BLD: 13.34 10*3/MM3 (ref 3.4–10.8)

## 2020-02-02 PROCEDURE — 83036 HEMOGLOBIN GLYCOSYLATED A1C: CPT | Performed by: ANESTHESIOLOGY

## 2020-02-02 PROCEDURE — 84132 ASSAY OF SERUM POTASSIUM: CPT | Performed by: ANESTHESIOLOGY

## 2020-02-02 PROCEDURE — 36415 COLL VENOUS BLD VENIPUNCTURE: CPT

## 2020-02-02 PROCEDURE — 85027 COMPLETE CBC AUTOMATED: CPT | Performed by: ANESTHESIOLOGY

## 2020-02-03 ENCOUNTER — ANESTHESIA EVENT (OUTPATIENT)
Dept: PERIOP | Facility: HOSPITAL | Age: 40
End: 2020-02-03

## 2020-02-03 RX ORDER — SODIUM CHLORIDE 0.9 % (FLUSH) 0.9 %
10 SYRINGE (ML) INJECTION EVERY 12 HOURS SCHEDULED
Status: CANCELLED | OUTPATIENT
Start: 2020-02-03

## 2020-02-03 RX ORDER — SODIUM CHLORIDE 0.9 % (FLUSH) 0.9 %
10 SYRINGE (ML) INJECTION AS NEEDED
Status: CANCELLED | OUTPATIENT
Start: 2020-02-03

## 2020-02-03 RX ORDER — FAMOTIDINE 10 MG/ML
20 INJECTION, SOLUTION INTRAVENOUS ONCE
Status: CANCELLED | OUTPATIENT
Start: 2020-02-03 | End: 2020-02-03

## 2020-02-03 RX ORDER — LIDOCAINE HYDROCHLORIDE 10 MG/ML
0.5 INJECTION, SOLUTION EPIDURAL; INFILTRATION; INTRACAUDAL; PERINEURAL ONCE AS NEEDED
Status: CANCELLED | OUTPATIENT
Start: 2020-02-03

## 2020-02-03 RX ORDER — SODIUM CHLORIDE, SODIUM LACTATE, POTASSIUM CHLORIDE, CALCIUM CHLORIDE 600; 310; 30; 20 MG/100ML; MG/100ML; MG/100ML; MG/100ML
9 INJECTION, SOLUTION INTRAVENOUS CONTINUOUS
Status: CANCELLED | OUTPATIENT
Start: 2020-02-03

## 2020-02-03 RX ORDER — FAMOTIDINE 20 MG/1
20 TABLET, FILM COATED ORAL ONCE
Status: CANCELLED | OUTPATIENT
Start: 2020-02-03 | End: 2020-02-03

## 2020-02-04 ENCOUNTER — HOSPITAL ENCOUNTER (INPATIENT)
Facility: HOSPITAL | Age: 40
LOS: 2 days | Discharge: HOME | End: 2020-02-06
Attending: SURGERY | Admitting: SURGERY

## 2020-02-04 ENCOUNTER — ANESTHESIA (OUTPATIENT)
Dept: PERIOP | Facility: HOSPITAL | Age: 40
End: 2020-02-04

## 2020-02-04 DIAGNOSIS — E66.9 OBESITY, CLASS II, BMI 35-39.9: ICD-10-CM

## 2020-02-04 LAB
B-HCG UR QL: NEGATIVE
INTERNAL NEGATIVE CONTROL: NEGATIVE
INTERNAL POSITIVE CONTROL: POSITIVE
Lab: NORMAL

## 2020-02-04 PROCEDURE — 88307 TISSUE EXAM BY PATHOLOGIST: CPT | Performed by: SURGERY

## 2020-02-04 PROCEDURE — 0DB64Z3 EXCISION OF STOMACH, PERCUTANEOUS ENDOSCOPIC APPROACH, VERTICAL: ICD-10-PCS | Performed by: SURGERY

## 2020-02-04 PROCEDURE — 25010000002 NEOSTIGMINE 10 MG/10ML SOLUTION: Performed by: ANESTHESIOLOGY

## 2020-02-04 PROCEDURE — 25010000002 DEXAMETHASONE PER 1 MG: Performed by: NURSE ANESTHETIST, CERTIFIED REGISTERED

## 2020-02-04 PROCEDURE — 25010000002 ONDANSETRON PER 1 MG: Performed by: ANESTHESIOLOGY

## 2020-02-04 PROCEDURE — 25010000002 BUPRENORPHINE PER 0.1 MG: Performed by: ANESTHESIOLOGY

## 2020-02-04 PROCEDURE — 25010000002 PROPOFOL 10 MG/ML EMULSION: Performed by: NURSE ANESTHETIST, CERTIFIED REGISTERED

## 2020-02-04 PROCEDURE — 0DJ08ZZ INSPECTION OF UPPER INTESTINAL TRACT, VIA NATURAL OR ARTIFICIAL OPENING ENDOSCOPIC: ICD-10-PCS | Performed by: SURGERY

## 2020-02-04 PROCEDURE — 25010000002 METOCLOPRAMIDE PER 10 MG: Performed by: SURGERY

## 2020-02-04 PROCEDURE — 81025 URINE PREGNANCY TEST: CPT | Performed by: SURGERY

## 2020-02-04 PROCEDURE — 25010000003 CEFAZOLIN IN DEXTROSE 2-4 GM/100ML-% SOLUTION: Performed by: SURGERY

## 2020-02-04 PROCEDURE — 25010000002 HYDROMORPHONE PER 4 MG: Performed by: SURGERY

## 2020-02-04 PROCEDURE — 94799 UNLISTED PULMONARY SVC/PX: CPT

## 2020-02-04 PROCEDURE — 25010000002 DEXAMETHASONE SODIUM PHOSPHATE 10 MG/ML SOLUTION: Performed by: ANESTHESIOLOGY

## 2020-02-04 PROCEDURE — 25010000002 FENTANYL CITRATE (PF) 100 MCG/2ML SOLUTION: Performed by: NURSE ANESTHETIST, CERTIFIED REGISTERED

## 2020-02-04 PROCEDURE — 25010000003 LIDOCAINE 1 % SOLUTION: Performed by: NURSE ANESTHETIST, CERTIFIED REGISTERED

## 2020-02-04 PROCEDURE — 25010000002 ENOXAPARIN PER 10 MG: Performed by: SURGERY

## 2020-02-04 PROCEDURE — 43775 LAP SLEEVE GASTRECTOMY: CPT | Performed by: SURGERY

## 2020-02-04 DEVICE — BLACK REINFORCED INTELLIGENT RELOAD, FOR USE WITH SIGNIA STAPLING SYSTEM
Type: IMPLANTABLE DEVICE | Site: ABDOMEN | Status: FUNCTIONAL
Brand: TRI-STAPLE 2.0

## 2020-02-04 DEVICE — REINFORCED INTELLIGENT RELOAD, FOR USE WITH SIGNIA STAPLING SYSTEM
Type: IMPLANTABLE DEVICE | Site: ABDOMEN | Status: FUNCTIONAL
Brand: TRI-STAPLE 2.0

## 2020-02-04 RX ORDER — SODIUM CHLORIDE 9 MG/ML
150 INJECTION, SOLUTION INTRAVENOUS CONTINUOUS
Status: DISCONTINUED | OUTPATIENT
Start: 2020-02-04 | End: 2020-02-04 | Stop reason: HOSPADM

## 2020-02-04 RX ORDER — SIMETHICONE 80 MG
80 TABLET,CHEWABLE ORAL 4 TIMES DAILY PRN
Status: DISCONTINUED | OUTPATIENT
Start: 2020-02-04 | End: 2020-02-06 | Stop reason: HOSPADM

## 2020-02-04 RX ORDER — ONDANSETRON 2 MG/ML
INJECTION INTRAMUSCULAR; INTRAVENOUS AS NEEDED
Status: DISCONTINUED | OUTPATIENT
Start: 2020-02-04 | End: 2020-02-04 | Stop reason: SURG

## 2020-02-04 RX ORDER — ONDANSETRON 4 MG/1
4 TABLET, FILM COATED ORAL EVERY 6 HOURS PRN
Status: DISCONTINUED | OUTPATIENT
Start: 2020-02-06 | End: 2020-02-06 | Stop reason: HOSPADM

## 2020-02-04 RX ORDER — CEFAZOLIN SODIUM 2 G/100ML
2 INJECTION, SOLUTION INTRAVENOUS ONCE
Status: COMPLETED | OUTPATIENT
Start: 2020-02-04 | End: 2020-02-04

## 2020-02-04 RX ORDER — PANTOPRAZOLE SODIUM 40 MG/10ML
40 INJECTION, POWDER, LYOPHILIZED, FOR SOLUTION INTRAVENOUS ONCE
Status: DISCONTINUED | OUTPATIENT
Start: 2020-02-04 | End: 2020-02-06 | Stop reason: HOSPADM

## 2020-02-04 RX ORDER — PROMETHAZINE HYDROCHLORIDE 25 MG/1
25 SUPPOSITORY RECTAL ONCE AS NEEDED
Status: DISCONTINUED | OUTPATIENT
Start: 2020-02-04 | End: 2020-02-04 | Stop reason: HOSPADM

## 2020-02-04 RX ORDER — SCOLOPAMINE TRANSDERMAL SYSTEM 1 MG/1
1 PATCH, EXTENDED RELEASE TRANSDERMAL ONCE
Status: DISCONTINUED | OUTPATIENT
Start: 2020-02-04 | End: 2020-02-06 | Stop reason: HOSPADM

## 2020-02-04 RX ORDER — SODIUM CHLORIDE, SODIUM LACTATE, POTASSIUM CHLORIDE, CALCIUM CHLORIDE 600; 310; 30; 20 MG/100ML; MG/100ML; MG/100ML; MG/100ML
150 INJECTION, SOLUTION INTRAVENOUS CONTINUOUS
Status: ACTIVE | OUTPATIENT
Start: 2020-02-04 | End: 2020-02-05

## 2020-02-04 RX ORDER — OXYCODONE HYDROCHLORIDE 5 MG/1
5 TABLET ORAL EVERY 4 HOURS PRN
Status: DISCONTINUED | OUTPATIENT
Start: 2020-02-04 | End: 2020-02-06 | Stop reason: HOSPADM

## 2020-02-04 RX ORDER — LIDOCAINE HYDROCHLORIDE 10 MG/ML
INJECTION, SOLUTION INFILTRATION; PERINEURAL AS NEEDED
Status: DISCONTINUED | OUTPATIENT
Start: 2020-02-04 | End: 2020-02-04 | Stop reason: SURG

## 2020-02-04 RX ORDER — DEXAMETHASONE SODIUM PHOSPHATE 4 MG/ML
INJECTION, SOLUTION INTRA-ARTICULAR; INTRALESIONAL; INTRAMUSCULAR; INTRAVENOUS; SOFT TISSUE AS NEEDED
Status: DISCONTINUED | OUTPATIENT
Start: 2020-02-04 | End: 2020-02-04 | Stop reason: SURG

## 2020-02-04 RX ORDER — CEFAZOLIN SODIUM 2 G/100ML
2 INJECTION, SOLUTION INTRAVENOUS EVERY 8 HOURS
Status: COMPLETED | OUTPATIENT
Start: 2020-02-05 | End: 2020-02-05

## 2020-02-04 RX ORDER — PROMETHAZINE HYDROCHLORIDE 25 MG/ML
6.25 INJECTION, SOLUTION INTRAMUSCULAR; INTRAVENOUS ONCE AS NEEDED
Status: DISCONTINUED | OUTPATIENT
Start: 2020-02-04 | End: 2020-02-04 | Stop reason: HOSPADM

## 2020-02-04 RX ORDER — AMLODIPINE BESYLATE 5 MG/1
5 TABLET ORAL DAILY
Status: DISCONTINUED | OUTPATIENT
Start: 2020-02-05 | End: 2020-02-06 | Stop reason: HOSPADM

## 2020-02-04 RX ORDER — PROMETHAZINE HYDROCHLORIDE 25 MG/1
12.5 TABLET ORAL EVERY 6 HOURS PRN
Status: DISCONTINUED | OUTPATIENT
Start: 2020-02-04 | End: 2020-02-06 | Stop reason: HOSPADM

## 2020-02-04 RX ORDER — ATROPINE SULFATE 1 MG/ML
0.5 INJECTION, SOLUTION INTRAMUSCULAR; INTRAVENOUS; SUBCUTANEOUS ONCE AS NEEDED
Status: DISCONTINUED | OUTPATIENT
Start: 2020-02-04 | End: 2020-02-04 | Stop reason: HOSPADM

## 2020-02-04 RX ORDER — SODIUM CHLORIDE 0.9 % (FLUSH) 0.9 %
1-10 SYRINGE (ML) INJECTION AS NEEDED
Status: DISCONTINUED | OUTPATIENT
Start: 2020-02-04 | End: 2020-02-06 | Stop reason: HOSPADM

## 2020-02-04 RX ORDER — NALOXONE HCL 0.4 MG/ML
0.1 VIAL (ML) INJECTION
Status: DISCONTINUED | OUTPATIENT
Start: 2020-02-04 | End: 2020-02-06 | Stop reason: HOSPADM

## 2020-02-04 RX ORDER — PROPOFOL 10 MG/ML
VIAL (ML) INTRAVENOUS AS NEEDED
Status: DISCONTINUED | OUTPATIENT
Start: 2020-02-04 | End: 2020-02-04 | Stop reason: SURG

## 2020-02-04 RX ORDER — DEXAMETHASONE SODIUM PHOSPHATE 10 MG/ML
INJECTION, SOLUTION INTRAMUSCULAR; INTRAVENOUS
Status: COMPLETED | OUTPATIENT
Start: 2020-02-04 | End: 2020-02-04

## 2020-02-04 RX ORDER — HYDROMORPHONE HYDROCHLORIDE 1 MG/ML
0.5 INJECTION, SOLUTION INTRAMUSCULAR; INTRAVENOUS; SUBCUTANEOUS
Status: DISCONTINUED | OUTPATIENT
Start: 2020-02-04 | End: 2020-02-06 | Stop reason: HOSPADM

## 2020-02-04 RX ORDER — PROMETHAZINE HYDROCHLORIDE 25 MG/1
25 TABLET ORAL ONCE AS NEEDED
Status: DISCONTINUED | OUTPATIENT
Start: 2020-02-04 | End: 2020-02-04 | Stop reason: HOSPADM

## 2020-02-04 RX ORDER — ONDANSETRON 2 MG/ML
4 INJECTION INTRAMUSCULAR; INTRAVENOUS EVERY 6 HOURS
Status: COMPLETED | OUTPATIENT
Start: 2020-02-05 | End: 2020-02-05

## 2020-02-04 RX ORDER — DIPHENHYDRAMINE HYDROCHLORIDE 50 MG/ML
25 INJECTION INTRAMUSCULAR; INTRAVENOUS EVERY 4 HOURS PRN
Status: DISCONTINUED | OUTPATIENT
Start: 2020-02-04 | End: 2020-02-06 | Stop reason: HOSPADM

## 2020-02-04 RX ORDER — LIDOCAINE HYDROCHLORIDE 10 MG/ML
0.5 INJECTION, SOLUTION EPIDURAL; INFILTRATION; INTRACAUDAL; PERINEURAL ONCE AS NEEDED
Status: COMPLETED | OUTPATIENT
Start: 2020-02-04 | End: 2020-02-04

## 2020-02-04 RX ORDER — METOCLOPRAMIDE HYDROCHLORIDE 5 MG/ML
10 INJECTION INTRAMUSCULAR; INTRAVENOUS EVERY 6 HOURS PRN
Status: DISCONTINUED | OUTPATIENT
Start: 2020-02-04 | End: 2020-02-06 | Stop reason: HOSPADM

## 2020-02-04 RX ORDER — NEOSTIGMINE METHYLSULFATE 1 MG/ML
INJECTION, SOLUTION INTRAVENOUS AS NEEDED
Status: DISCONTINUED | OUTPATIENT
Start: 2020-02-04 | End: 2020-02-04 | Stop reason: SURG

## 2020-02-04 RX ORDER — LABETALOL HYDROCHLORIDE 5 MG/ML
10 INJECTION, SOLUTION INTRAVENOUS
Status: DISCONTINUED | OUTPATIENT
Start: 2020-02-04 | End: 2020-02-06 | Stop reason: HOSPADM

## 2020-02-04 RX ORDER — ESMOLOL HYDROCHLORIDE 10 MG/ML
INJECTION INTRAVENOUS AS NEEDED
Status: DISCONTINUED | OUTPATIENT
Start: 2020-02-04 | End: 2020-02-04 | Stop reason: SURG

## 2020-02-04 RX ORDER — GLYCOPYRROLATE 0.2 MG/ML
INJECTION INTRAMUSCULAR; INTRAVENOUS AS NEEDED
Status: DISCONTINUED | OUTPATIENT
Start: 2020-02-04 | End: 2020-02-04 | Stop reason: SURG

## 2020-02-04 RX ORDER — PANTOPRAZOLE SODIUM 40 MG/1
40 TABLET, DELAYED RELEASE ORAL EVERY MORNING
Status: DISCONTINUED | OUTPATIENT
Start: 2020-02-05 | End: 2020-02-06 | Stop reason: HOSPADM

## 2020-02-04 RX ORDER — MAGNESIUM HYDROXIDE 1200 MG/15ML
LIQUID ORAL AS NEEDED
Status: DISCONTINUED | OUTPATIENT
Start: 2020-02-04 | End: 2020-02-04 | Stop reason: HOSPADM

## 2020-02-04 RX ORDER — FENTANYL CITRATE 50 UG/ML
50 INJECTION, SOLUTION INTRAMUSCULAR; INTRAVENOUS
Status: DISCONTINUED | OUTPATIENT
Start: 2020-02-04 | End: 2020-02-04 | Stop reason: HOSPADM

## 2020-02-04 RX ORDER — PROMETHAZINE HYDROCHLORIDE 25 MG/ML
12.5 INJECTION, SOLUTION INTRAMUSCULAR; INTRAVENOUS EVERY 6 HOURS PRN
Status: DISCONTINUED | OUTPATIENT
Start: 2020-02-04 | End: 2020-02-06 | Stop reason: HOSPADM

## 2020-02-04 RX ORDER — ATORVASTATIN CALCIUM 10 MG/1
10 TABLET, FILM COATED ORAL DAILY
Status: DISCONTINUED | OUTPATIENT
Start: 2020-02-04 | End: 2020-02-06 | Stop reason: HOSPADM

## 2020-02-04 RX ORDER — ROCURONIUM BROMIDE 10 MG/ML
INJECTION, SOLUTION INTRAVENOUS AS NEEDED
Status: DISCONTINUED | OUTPATIENT
Start: 2020-02-04 | End: 2020-02-04 | Stop reason: SURG

## 2020-02-04 RX ORDER — BUPIVACAINE HYDROCHLORIDE 2.5 MG/ML
INJECTION, SOLUTION EPIDURAL; INFILTRATION; INTRACAUDAL
Status: COMPLETED | OUTPATIENT
Start: 2020-02-04 | End: 2020-02-04

## 2020-02-04 RX ORDER — SODIUM CHLORIDE AND POTASSIUM CHLORIDE 150; 450 MG/100ML; MG/100ML
100 INJECTION, SOLUTION INTRAVENOUS CONTINUOUS
Status: DISCONTINUED | OUTPATIENT
Start: 2020-02-05 | End: 2020-02-06 | Stop reason: HOSPADM

## 2020-02-04 RX ORDER — ALPRAZOLAM 0.5 MG/1
0.25 TABLET ORAL 2 TIMES DAILY PRN
Status: DISCONTINUED | OUTPATIENT
Start: 2020-02-04 | End: 2020-02-06 | Stop reason: HOSPADM

## 2020-02-04 RX ORDER — EPHEDRINE SULFATE 50 MG/ML
5 INJECTION, SOLUTION INTRAVENOUS ONCE AS NEEDED
Status: DISCONTINUED | OUTPATIENT
Start: 2020-02-04 | End: 2020-02-04 | Stop reason: HOSPADM

## 2020-02-04 RX ORDER — ACETAMINOPHEN 500 MG
1000 TABLET ORAL ONCE
Status: COMPLETED | OUTPATIENT
Start: 2020-02-04 | End: 2020-02-04

## 2020-02-04 RX ORDER — HYDROMORPHONE HYDROCHLORIDE 1 MG/ML
0.5 INJECTION, SOLUTION INTRAMUSCULAR; INTRAVENOUS; SUBCUTANEOUS
Status: DISCONTINUED | OUTPATIENT
Start: 2020-02-04 | End: 2020-02-04 | Stop reason: HOSPADM

## 2020-02-04 RX ORDER — PANTOPRAZOLE SODIUM 40 MG/10ML
40 INJECTION, POWDER, LYOPHILIZED, FOR SOLUTION INTRAVENOUS ONCE
Status: COMPLETED | OUTPATIENT
Start: 2020-02-04 | End: 2020-02-04

## 2020-02-04 RX ORDER — CHLORHEXIDINE GLUCONATE 0.12 MG/ML
15 RINSE ORAL
Status: COMPLETED | OUTPATIENT
Start: 2020-02-04 | End: 2020-02-04

## 2020-02-04 RX ORDER — SERTRALINE HYDROCHLORIDE 100 MG/1
100 TABLET, FILM COATED ORAL DAILY
Status: DISCONTINUED | OUTPATIENT
Start: 2020-02-05 | End: 2020-02-06 | Stop reason: HOSPADM

## 2020-02-04 RX ORDER — GABAPENTIN 300 MG/1
300 CAPSULE ORAL 3 TIMES DAILY
Status: DISCONTINUED | OUTPATIENT
Start: 2020-02-04 | End: 2020-02-06 | Stop reason: HOSPADM

## 2020-02-04 RX ORDER — ACETAMINOPHEN 500 MG
1000 TABLET ORAL EVERY 12 HOURS
Status: COMPLETED | OUTPATIENT
Start: 2020-02-04 | End: 2020-02-06

## 2020-02-04 RX ORDER — CYANOCOBALAMIN 1000 UG/ML
1000 INJECTION, SOLUTION INTRAMUSCULAR; SUBCUTANEOUS ONCE
Status: COMPLETED | OUTPATIENT
Start: 2020-02-05 | End: 2020-02-05

## 2020-02-04 RX ORDER — SODIUM CHLORIDE 0.9 % (FLUSH) 0.9 %
3 SYRINGE (ML) INJECTION EVERY 12 HOURS SCHEDULED
Status: DISCONTINUED | OUTPATIENT
Start: 2020-02-04 | End: 2020-02-06 | Stop reason: HOSPADM

## 2020-02-04 RX ORDER — GABAPENTIN 300 MG/1
600 CAPSULE ORAL ONCE
Status: COMPLETED | OUTPATIENT
Start: 2020-02-04 | End: 2020-02-04

## 2020-02-04 RX ORDER — HYDROMORPHONE HYDROCHLORIDE 2 MG/1
2 TABLET ORAL
Status: DISCONTINUED | OUTPATIENT
Start: 2020-02-04 | End: 2020-02-06 | Stop reason: HOSPADM

## 2020-02-04 RX ORDER — GABAPENTIN 100 MG/1
100 CAPSULE ORAL 3 TIMES DAILY
Status: DISCONTINUED | OUTPATIENT
Start: 2020-02-04 | End: 2020-02-06 | Stop reason: HOSPADM

## 2020-02-04 RX ORDER — PROCHLORPERAZINE MALEATE 10 MG
10 TABLET ORAL EVERY 6 HOURS PRN
Status: DISCONTINUED | OUTPATIENT
Start: 2020-02-04 | End: 2020-02-06 | Stop reason: HOSPADM

## 2020-02-04 RX ORDER — BUPRENORPHINE HYDROCHLORIDE 0.32 MG/ML
INJECTION INTRAMUSCULAR; INTRAVENOUS
Status: COMPLETED | OUTPATIENT
Start: 2020-02-04 | End: 2020-02-04

## 2020-02-04 RX ADMIN — SODIUM CHLORIDE 150 ML/HR: 9 INJECTION, SOLUTION INTRAVENOUS at 15:00

## 2020-02-04 RX ADMIN — CHLORHEXIDINE GLUCONATE 0.12% ORAL RINSE 15 ML: 1.2 LIQUID ORAL at 12:52

## 2020-02-04 RX ADMIN — ATORVASTATIN CALCIUM 10 MG: 10 TABLET, FILM COATED ORAL at 19:49

## 2020-02-04 RX ADMIN — ESMOLOL HYDROCHLORIDE 50 MG: 10 INJECTION, SOLUTION INTRAVENOUS at 15:54

## 2020-02-04 RX ADMIN — GLYCOPYRROLATE 0.4 MG: 0.2 INJECTION, SOLUTION INTRAMUSCULAR; INTRAVENOUS at 17:48

## 2020-02-04 RX ADMIN — CHLORHEXIDINE GLUCONATE 0.12% ORAL RINSE 15 ML: 1.2 LIQUID ORAL at 13:03

## 2020-02-04 RX ADMIN — DEXAMETHASONE SODIUM PHOSPHATE 4 MG: 10 INJECTION, SOLUTION INTRAMUSCULAR; INTRAVENOUS at 16:10

## 2020-02-04 RX ADMIN — ONDANSETRON 4 MG: 2 INJECTION INTRAMUSCULAR; INTRAVENOUS at 17:43

## 2020-02-04 RX ADMIN — ONDANSETRON 4 MG: 2 INJECTION INTRAMUSCULAR; INTRAVENOUS at 23:08

## 2020-02-04 RX ADMIN — ROCURONIUM BROMIDE 10 MG: 10 SOLUTION INTRAVENOUS at 17:28

## 2020-02-04 RX ADMIN — ACETAMINOPHEN 1000 MG: 500 TABLET, FILM COATED ORAL at 19:49

## 2020-02-04 RX ADMIN — GABAPENTIN 600 MG: 300 CAPSULE ORAL at 12:51

## 2020-02-04 RX ADMIN — HYDROMORPHONE HYDROCHLORIDE 0.5 MG: 1 INJECTION, SOLUTION INTRAMUSCULAR; INTRAVENOUS; SUBCUTANEOUS at 20:57

## 2020-02-04 RX ADMIN — BUPRENORPHINE HYDROCHLORIDE 0.3 MG: 0.32 INJECTION INTRAMUSCULAR; INTRAVENOUS at 16:10

## 2020-02-04 RX ADMIN — NEOSTIGMINE METHYLSULFATE 2 MG: 1 INJECTION, SOLUTION INTRAVENOUS at 17:48

## 2020-02-04 RX ADMIN — BUPIVACAINE HYDROCHLORIDE 60 ML: 2.5 INJECTION, SOLUTION EPIDURAL; INFILTRATION; INTRACAUDAL; PERINEURAL at 16:10

## 2020-02-04 RX ADMIN — ROCURONIUM BROMIDE 50 MG: 10 SOLUTION INTRAVENOUS at 15:54

## 2020-02-04 RX ADMIN — SCOPALAMINE 1 PATCH: 1 PATCH, EXTENDED RELEASE TRANSDERMAL at 12:52

## 2020-02-04 RX ADMIN — PANTOPRAZOLE SODIUM 40 MG: 40 INJECTION, POWDER, FOR SOLUTION INTRAVENOUS at 12:52

## 2020-02-04 RX ADMIN — GABAPENTIN 300 MG: 300 CAPSULE ORAL at 19:48

## 2020-02-04 RX ADMIN — SODIUM CHLORIDE, PRESERVATIVE FREE 3 ML: 5 INJECTION INTRAVENOUS at 19:50

## 2020-02-04 RX ADMIN — ACETAMINOPHEN 1000 MG: 500 TABLET ORAL at 12:51

## 2020-02-04 RX ADMIN — FENTANYL CITRATE 50 MCG: 0.05 INJECTION, SOLUTION INTRAMUSCULAR; INTRAVENOUS at 18:27

## 2020-02-04 RX ADMIN — LIDOCAINE HYDROCHLORIDE 50 MG: 10 INJECTION, SOLUTION INFILTRATION; PERINEURAL at 15:54

## 2020-02-04 RX ADMIN — HYDROMORPHONE HYDROCHLORIDE 0.5 MG: 1 INJECTION, SOLUTION INTRAMUSCULAR; INTRAVENOUS; SUBCUTANEOUS at 23:16

## 2020-02-04 RX ADMIN — LIDOCAINE HYDROCHLORIDE 0.5 ML: 10 INJECTION, SOLUTION EPIDURAL; INFILTRATION; INTRACAUDAL; PERINEURAL at 12:52

## 2020-02-04 RX ADMIN — OXYCODONE HYDROCHLORIDE 5 MG: 5 TABLET ORAL at 19:48

## 2020-02-04 RX ADMIN — CEFAZOLIN SODIUM 2 G: 2 INJECTION, SOLUTION INTRAVENOUS at 23:08

## 2020-02-04 RX ADMIN — PROPOFOL 200 MG: 10 INJECTION, EMULSION INTRAVENOUS at 15:54

## 2020-02-04 RX ADMIN — SODIUM CHLORIDE 1000 ML: 9 INJECTION, SOLUTION INTRAVENOUS at 14:00

## 2020-02-04 RX ADMIN — DEXAMETHASONE SODIUM PHOSPHATE 8 MG: 4 INJECTION, SOLUTION INTRAMUSCULAR; INTRAVENOUS at 15:54

## 2020-02-04 RX ADMIN — METOCLOPRAMIDE 10 MG: 5 INJECTION, SOLUTION INTRAMUSCULAR; INTRAVENOUS at 20:57

## 2020-02-04 RX ADMIN — PROPOFOL 25 MCG/KG/MIN: 10 INJECTION, EMULSION INTRAVENOUS at 15:51

## 2020-02-04 RX ADMIN — CEFAZOLIN SODIUM 2 G: 2 INJECTION, SOLUTION INTRAVENOUS at 15:51

## 2020-02-04 NOTE — BRIEF OP NOTE
GASTRIC SLEEVE LAPAROSCOPIC, ESOPHAGOGASTRODUODENOSCOPY  Progress Note    Mariel Fox  2/4/2020    Pre-op Diagnosis:   Obesity, Class II, BMI 35-39.9 [E66.9]       Post-Op Diagnosis Codes:     * Obesity, Class II, BMI 35-39.9 [E66.9]    Procedure/CPT® Codes:  OH LAP, ABDULLAHI RESTRICT PROC, LONGITUDINAL GASTRECTOMY [67018]  OH ESOPHAGOGASTRODUODENOSCOPY TRANSORAL DIAGNOSTIC [58178]    Procedure(s):  GASTRIC SLEEVE LAPAROSCOPIC  ESOPHAGOGASTRODUODENOSCOPY    Surgeon(s):  Shayy Ojeda MD    Anesthesia: General with Block    Staff:   Circulator: Amy Turner RN; Shahrzad Moses RN  Scrub Person: Gracy Gordon  Nursing Assistant: Rasheeda Mo    Estimated Blood Loss: minimal    Urine Voided: * No values recorded between 2/4/2020  3:48 PM and 2/4/2020  6:06 PM *    Specimens:                Specimens     ID Source Type Tests Collected By Collected At Frozen?      A Stomach Tissue · TISSUE PATHOLOGY EXAM   Shayy Ojeda MD 2/4/20 1737 No     Description: SUB-TOTAL GASTRECTOMY    This specimen was not marked as sent.                Drains: * No LDAs found *    Findings: No hiatal hernia.  Normal gallbladder.  Small amount of splenic infarct after division of short gastric vessels as expected.      Complications: 45 minute delay in case awaiting surgical stapler.          Shayy Ojeda MD     Date: 2/4/2020  Time: 6:21 PM

## 2020-02-04 NOTE — ANESTHESIA PREPROCEDURE EVALUATION
Anesthesia Evaluation                  Airway   Mallampati: I  TM distance: >3 FB  Neck ROM: full  No difficulty expected  Dental      Pulmonary    (+) shortness of breath,   Cardiovascular     (+) hypertension,       Neuro/Psych  (+) psychiatric history Depression,     GI/Hepatic/Renal/Endo    (+) obesity,       Musculoskeletal     Abdominal    Substance History      OB/GYN          Other   arthritis,                      Anesthesia Plan    ASA 3     general   (Tap)  intravenous induction     Anesthetic plan, all risks, benefits, and alternatives have been provided, discussed and informed consent has been obtained with: patient.    Plan discussed with CRNA.

## 2020-02-04 NOTE — ANESTHESIA PROCEDURE NOTES
Peripheral Block      Patient reassessed immediately prior to procedure    Patient location during procedure: OR  Reason for block: at surgeon's request and post-op pain management  Performed by  CRNA: Guille Berry CRNA  Preanesthetic Checklist  Completed: patient identified, site marked, surgical consent, pre-op evaluation, timeout performed, IV checked, risks and benefits discussed and monitors and equipment checked  Prep:  Pt Position: supine  Sterile barriers:cap, gloves, sterile barriers and mask  Prep: ChloraPrep  Patient monitoring: blood pressure monitoring, continuous pulse oximetry and EKG  Procedure  Sedation:no  Performed under: general  Guidance:ultrasound guided  Images:still images obtained, printed/placed on chart    Laterality:Bilateral  Block Type:TAP  Injection Technique:single-shot  Needle Type:short-bevel and echogenic  Needle Gauge:20 G  Resistance on Injection: none    Medications Used: buprenorphine (BUPRENEX) injection, 0.3 mg  dexamethasone sodium phosphate injection, 4 mg  bupivacaine PF (MARCAINE) 0.25 % injection, 60 mL  Med admintered at 2/4/2020 4:10 PM      Medications  Comment:Block Injection:  LA dose divided between Right and Left block        Post Assessment  Injection Assessment: negative aspiration for heme, incremental injection and no paresthesia on injection  Patient Tolerance:comfortable throughout block  Complications:no  Additional Notes      Under Ultrasound guidance, a BBraun 4inch 360 degree needle was advanced with Normal Saline hydro dissection of tissue.  The Internal Oblique and Transversus Abdominus muscles where visualized.  At or before the aponeurosis of Internal Oblique, local anesthetic spread was visualized in the Transversus Abdominus Plane. Injection was made incrementally with aspiration every 5 mls.  There was no  intravascular injection,  injection pressure was normal, there was no neural injection, and the procedure was completed without  difficulty.  Thank You.

## 2020-02-04 NOTE — ANESTHESIA PROCEDURE NOTES
Airway  Urgency: elective    Date/Time: 2/4/2020 4:03 PM  Airway not difficult    General Information and Staff    Patient location during procedure: OR  CRNA: Guille Berry CRNA    Indications and Patient Condition  Indications for airway management: airway protection    Preoxygenated: yes  MILS not maintained throughout  Mask difficulty assessment: 1 - vent by mask    Final Airway Details  Final airway type: endotracheal airway      Successful airway: ETT  Cuffed: yes   Successful intubation technique: direct laryngoscopy  Endotracheal tube insertion site: oral  Blade: Johnny  Blade size: 3  ETT size (mm): 7.0  Cormack-Lehane Classification: grade I - full view of glottis  Placement verified by: chest auscultation and capnometry   Cuff volume (mL): 8  Measured from: lips  ETT/EBT  to lips (cm): 20  Number of attempts at approach: 1  Assessment: lips, teeth, and gum same as pre-op and atraumatic intubation    Additional Comments  Negative epigastric sounds, Breath sound equal bilaterally with symmetric chest rise and fall

## 2020-02-04 NOTE — OP NOTE
OPERATIVE REPORT    DATE: 02/04/20    PATIENT: Mariel Fox    PREOPERATIVE DIAGNOSIS:    1. Morbid obesity with comorbidities    POSTOPERATIVE DIAGNOSIS:    1. Morbid obesity with multiple comorbidities.    PROCEDURES PERFORMED:  1. Laparoscopic sleeve gastrectomy (85% subtotal vertical gastrectomy)    2.  Esophagogastroduodenoscopy    SURGEON:  Shayy Ojeda M.D.    ANESTHESIA:  General endotracheal with TANYA block    ESTIMATED BLOOD LOSS:   5 mL    FLUIDS:  Crystalloids.    SPECIMENS:  Subtotal gastrectomy.    DRAINS:  None.    COUNTS:  Correct.    COMPLICATIONS:  45 minute delay in case awaiting surgical stapler.  .    FINDINGS: No hiatal hernia, normal gallbladder.  Small splenic infarct noted after ligation of short gastric vessels, not unexpected.      INDICATIONS:   Mariel Fox is a 39 y.o. year old female with morbid obesity and associated comorbidities who presents for elective laparoscopic sleeve gastrectomy. The patient has has undergone our extensive preoperative education, teaching, and consent process. Everything is in order and they wish to proceed.         DESCRIPTION OF PROCEDURE:   The patient was brought to the operating room and placed supine upon the operating room table. SCDs were placed. The patient underwent uneventful general endotracheal anesthesia and bilateral TANYA blocks per the anesthesiology staff.  She received subcutaneous Lovenox and was given Ancef. The abdomen was prepped with ChloraPrep and draped in the usual sterile fashion. An Ioban was used as well.  Timeout was performed.     A small transverse incision was made a few centimeters above and to the left of the umbilicus and the peritoneal cavity entered under direct camera visualization using a 5 mm 0° laparoscope and an Optiview trocar.  The abdomen was then insufflated to a pressure of 16 mmHg with CO2 gas. Exploratory laparoscopy revealed no evidence of injury from the entrance technique. The gallbladder was  normal.  The liver had a uniform capsule and was normal in size without evidence of gross hepatomegaly or fibrosis.  A 5 mm port was placed in the right mid abdomen laterally, a 12 mm port was placed just right of the midline about 15 cm below the costal margin, and a 15 mm port was placed just to the left of the 12 mm port.  A Ezra liver retractor was placed through a small subxiphoid stab incision and the the left lobe of the liver was elevated.     The stomach was decompressed with an 18 Ecuadorean orogastric tube, which was then positioned in the antrum. The greater curvature vessels were taken down with a Ligasure energy device beginning at the midpoint of the stomach and continued up to the angle of His, including the short gastrics. The left ty was completely exposed and there was no sign of hiatal hernia here or anteriorly. This was photodocumented. The GE junction fat pad was elevated to make a clear landing zone for the stapler later. The greater curvature vessels were taken down with the Ligasure extending distally within 3 cm of the pylorus. Filmy adhesions to the stomach were taken down posteriorly.      The standard clamp and 18 Ecuadorean orogastric tube were used to size the gastric sleeve. The stomach was marked in the 3 positions using an ink-stained laparoscopic Kittner.  The 3 markings were at the angle of His, the incisura and antrum using 1cm, 3cm and 5cm respectively.  It had come to my attention after the patient was already intubated that there would be a 25 minute delay on the Signia stapler.  It had taken about 30 minutes after that to prep and drape the patient, and to take down the greater curvature vessels.  The stapler was still not ready when I was ready to use it, and so there was another 45 minute delay before I could start stapling.  I loosened the clamp to prevent ischemia during this time.  When the stapler arrived, the stomach was reclamped using my ink marks as template.      Using the clamp as template, a sleeve gastrectomy was performed with an articulating Signia stapler bolstered by double tissue reinforcement. The first load was a 60mm black on the Signia stapler, the next two loads were purple 60 mm, and the last load was a 45 mm purple.  The staple line was examined and was hemostatic. The gastrectomy specimen was removed through the 15 mm port site in a specimen bag. The specimen was later examined, and the staples were well-formed. Palpation of the specimen revealed no masses. The staple line of the sleeve gastrectomy revealed staples that were well-formed. The upper abdomen was flooded with saline, and endoscopy was performed.     The flexible endoscope was passed transorally down to the pylorus easily. The sleeve extended to within 6 cm proximal to the pylorus and was hemostatic. The sleeve was not narrow or twisted. There was no hiatal hernia or distal esophagitis. The rest of the esophagus was normal. The scope was removed. The leak test was normal.        I rescrubbed and suctioned the irrigation fluid from the upper abdomen, making sure it was dry. The staple line on the stomach was hemostatic.  The staple line was treated with 4 ml of aerosolized Tisseel fibrin glue. The liver retractor was removed easily. The 15 mm port was removed under direct visualization and there was no bleeding. This incision was closed with an 0-vicryl transfascial suture using a suture passer under direct visualization in a horizontal mattress fashion. All other ports were removed and then replaced under direct visualization and all of these were hemostatic. The instruments were removed and the abdomen was desufflated. The ports were removed. A 2-0 vicryl was used to close the subcutaneous tissue in the 15 mm port site. 3-0 monocryl plus was used to close skin incisions with interrupted sutures. Skin Affix was placed. The patient was awakened and taken to recovery in good condition, having  tolerated the procedure well. All sponge, needle, and instrument counts were correct.

## 2020-02-04 NOTE — ANESTHESIA POSTPROCEDURE EVALUATION
Patient: Mariel Fox    Procedure Summary     Date:  02/04/20 Room / Location:   [facility] /  [facility]    Anesthesia Start:  1551 Anesthesia Stop:  1809    Procedures:       GASTRIC SLEEVE LAPAROSCOPIC (N/A Abdomen)      ESOPHAGOGASTRODUODENOSCOPY (N/A Esophagus) Diagnosis:       Obesity, Class II, BMI 35-39.9      (Obesity, Class II, BMI 35-39.9 [E66.9])    Surgeon:  Shayy Ojeda MD Provider:  Israel Quesada MD    Anesthesia Type:  general ASA Status:  3          Anesthesia Type: general    Vitals  No vitals data found for the desired time range.          Post Anesthesia Care and Evaluation    Patient location during evaluation: PACU  Patient participation: complete - patient participated  Level of consciousness: awake and alert  Pain score: 0  Pain management: adequate  Airway patency: patent  Anesthetic complications: No anesthetic complications  PONV Status: none  Cardiovascular status: hemodynamically stable and acceptable  Respiratory status: nonlabored ventilation, acceptable and nasal cannula  Hydration status: acceptable

## 2020-02-05 ENCOUNTER — APPOINTMENT (OUTPATIENT)
Dept: GENERAL RADIOLOGY | Facility: HOSPITAL | Age: 40
End: 2020-02-05

## 2020-02-05 LAB
ALBUMIN SERPL-MCNC: 3.8 G/DL (ref 3.5–5.2)
ALBUMIN/GLOB SERPL: 1.2 G/DL
ALP SERPL-CCNC: 72 U/L (ref 39–117)
ALT SERPL W P-5'-P-CCNC: 41 U/L (ref 1–33)
ANION GAP SERPL CALCULATED.3IONS-SCNC: 12 MMOL/L (ref 5–15)
AST SERPL-CCNC: 27 U/L (ref 1–32)
BASOPHILS # BLD AUTO: 0.02 10*3/MM3 (ref 0–0.2)
BASOPHILS NFR BLD AUTO: 0.1 % (ref 0–1.5)
BILIRUB SERPL-MCNC: 0.5 MG/DL (ref 0.2–1.2)
BUN BLD-MCNC: 7 MG/DL (ref 6–20)
BUN/CREAT SERPL: 12.7 (ref 7–25)
CALCIUM SPEC-SCNC: 8.6 MG/DL (ref 8.6–10.5)
CHLORIDE SERPL-SCNC: 101 MMOL/L (ref 98–107)
CO2 SERPL-SCNC: 23 MMOL/L (ref 22–29)
CREAT BLD-MCNC: 0.55 MG/DL (ref 0.57–1)
DEPRECATED RDW RBC AUTO: 45.9 FL (ref 37–54)
EOSINOPHIL # BLD AUTO: 0 10*3/MM3 (ref 0–0.4)
EOSINOPHIL NFR BLD AUTO: 0 % (ref 0.3–6.2)
ERYTHROCYTE [DISTWIDTH] IN BLOOD BY AUTOMATED COUNT: 13.2 % (ref 12.3–15.4)
GFR SERPL CREATININE-BSD FRML MDRD: 124 ML/MIN/1.73
GLOBULIN UR ELPH-MCNC: 3.2 GM/DL
GLUCOSE BLD-MCNC: 145 MG/DL (ref 65–99)
HCT VFR BLD AUTO: 38.7 % (ref 34–46.6)
HGB BLD-MCNC: 13.1 G/DL (ref 12–15.9)
IMM GRANULOCYTES # BLD AUTO: 0.08 10*3/MM3 (ref 0–0.05)
IMM GRANULOCYTES NFR BLD AUTO: 0.5 % (ref 0–0.5)
IRON 24H UR-MRATE: 31 MCG/DL (ref 37–145)
LYMPHOCYTES # BLD AUTO: 1.16 10*3/MM3 (ref 0.7–3.1)
LYMPHOCYTES NFR BLD AUTO: 7.7 % (ref 19.6–45.3)
MCH RBC QN AUTO: 32.1 PG (ref 26.6–33)
MCHC RBC AUTO-ENTMCNC: 33.9 G/DL (ref 31.5–35.7)
MCV RBC AUTO: 94.9 FL (ref 79–97)
MONOCYTES # BLD AUTO: 0.69 10*3/MM3 (ref 0.1–0.9)
MONOCYTES NFR BLD AUTO: 4.6 % (ref 5–12)
NEUTROPHILS # BLD AUTO: 13.09 10*3/MM3 (ref 1.7–7)
NEUTROPHILS NFR BLD AUTO: 87.1 % (ref 42.7–76)
NRBC BLD AUTO-RTO: 0 /100 WBC (ref 0–0.2)
PLATELET # BLD AUTO: 386 10*3/MM3 (ref 140–450)
PMV BLD AUTO: 9.6 FL (ref 6–12)
POTASSIUM BLD-SCNC: 4.4 MMOL/L (ref 3.5–5.2)
PROT SERPL-MCNC: 7 G/DL (ref 6–8.5)
RBC # BLD AUTO: 4.08 10*6/MM3 (ref 3.77–5.28)
SODIUM BLD-SCNC: 136 MMOL/L (ref 136–145)
WBC NRBC COR # BLD: 15.04 10*3/MM3 (ref 3.4–10.8)

## 2020-02-05 PROCEDURE — 25010000002 ENOXAPARIN PER 10 MG: Performed by: SURGERY

## 2020-02-05 PROCEDURE — 85025 COMPLETE CBC W/AUTO DIFF WBC: CPT | Performed by: SURGERY

## 2020-02-05 PROCEDURE — 94799 UNLISTED PULMONARY SVC/PX: CPT

## 2020-02-05 PROCEDURE — 25010000002 NA FERRIC GLUC CPLX PER 12.5 MG: Performed by: SURGERY

## 2020-02-05 PROCEDURE — 25010000003 CEFAZOLIN IN DEXTROSE 2-4 GM/100ML-% SOLUTION: Performed by: SURGERY

## 2020-02-05 PROCEDURE — 25010000002 CYANOCOBALAMIN PER 1000 MCG: Performed by: SURGERY

## 2020-02-05 PROCEDURE — 99024 POSTOP FOLLOW-UP VISIT: CPT | Performed by: SURGERY

## 2020-02-05 PROCEDURE — 25010000002 ONDANSETRON PER 1 MG: Performed by: SURGERY

## 2020-02-05 PROCEDURE — 83540 ASSAY OF IRON: CPT | Performed by: SURGERY

## 2020-02-05 PROCEDURE — 74240 X-RAY XM UPR GI TRC 1CNTRST: CPT

## 2020-02-05 PROCEDURE — 25010000002 HYDROMORPHONE PER 4 MG: Performed by: SURGERY

## 2020-02-05 PROCEDURE — 25010000002 THIAMINE PER 100 MG: Performed by: SURGERY

## 2020-02-05 PROCEDURE — 0 DIATRIZOATE MEGLUMINE & SODIUM PER 1 ML: Performed by: SURGERY

## 2020-02-05 PROCEDURE — 63710000001 PROMETHAZINE PER 25 MG: Performed by: SURGERY

## 2020-02-05 PROCEDURE — 80053 COMPREHEN METABOLIC PANEL: CPT | Performed by: SURGERY

## 2020-02-05 RX ADMIN — HYDROMORPHONE HYDROCHLORIDE 0.5 MG: 1 INJECTION, SOLUTION INTRAMUSCULAR; INTRAVENOUS; SUBCUTANEOUS at 02:51

## 2020-02-05 RX ADMIN — SIMETHICONE CHEW TAB 80 MG 80 MG: 80 TABLET ORAL at 11:28

## 2020-02-05 RX ADMIN — POTASSIUM CHLORIDE AND SODIUM CHLORIDE 100 ML/HR: 450; 150 INJECTION, SOLUTION INTRAVENOUS at 22:21

## 2020-02-05 RX ADMIN — AMLODIPINE BESYLATE 5 MG: 5 TABLET ORAL at 08:16

## 2020-02-05 RX ADMIN — GABAPENTIN 100 MG: 300 CAPSULE ORAL at 17:21

## 2020-02-05 RX ADMIN — CYANOCOBALAMIN 1000 MCG: 1000 INJECTION, SOLUTION INTRAMUSCULAR; SUBCUTANEOUS at 08:16

## 2020-02-05 RX ADMIN — CEFAZOLIN SODIUM 2 G: 2 INJECTION, SOLUTION INTRAVENOUS at 15:11

## 2020-02-05 RX ADMIN — PROMETHAZINE HYDROCHLORIDE 12.5 MG: 25 TABLET ORAL at 23:25

## 2020-02-05 RX ADMIN — Medication 30 ML: at 10:00

## 2020-02-05 RX ADMIN — OXYCODONE HYDROCHLORIDE 5 MG: 5 TABLET ORAL at 05:22

## 2020-02-05 RX ADMIN — GABAPENTIN 100 MG: 300 CAPSULE ORAL at 08:16

## 2020-02-05 RX ADMIN — GABAPENTIN 100 MG: 300 CAPSULE ORAL at 21:13

## 2020-02-05 RX ADMIN — POTASSIUM CHLORIDE AND SODIUM CHLORIDE 100 ML/HR: 450; 150 INJECTION, SOLUTION INTRAVENOUS at 11:01

## 2020-02-05 RX ADMIN — ONDANSETRON 4 MG: 2 INJECTION INTRAMUSCULAR; INTRAVENOUS at 11:28

## 2020-02-05 RX ADMIN — ONDANSETRON 4 MG: 2 INJECTION INTRAMUSCULAR; INTRAVENOUS at 05:22

## 2020-02-05 RX ADMIN — GABAPENTIN 300 MG: 300 CAPSULE ORAL at 21:12

## 2020-02-05 RX ADMIN — ALPRAZOLAM 0.25 MG: 0.5 TABLET ORAL at 09:27

## 2020-02-05 RX ADMIN — ACETAMINOPHEN 1000 MG: 500 TABLET, FILM COATED ORAL at 08:16

## 2020-02-05 RX ADMIN — ACETAMINOPHEN 1000 MG: 500 TABLET, FILM COATED ORAL at 21:12

## 2020-02-05 RX ADMIN — ENOXAPARIN SODIUM 40 MG: 40 INJECTION SUBCUTANEOUS at 08:16

## 2020-02-05 RX ADMIN — FOLIC ACID 250 ML/HR: 5 INJECTION, SOLUTION INTRAMUSCULAR; INTRAVENOUS; SUBCUTANEOUS at 05:23

## 2020-02-05 RX ADMIN — SODIUM CHLORIDE 125 MG: 9 INJECTION, SOLUTION INTRAVENOUS at 17:21

## 2020-02-05 RX ADMIN — ONDANSETRON 4 MG: 2 INJECTION INTRAMUSCULAR; INTRAVENOUS at 17:21

## 2020-02-05 RX ADMIN — GABAPENTIN 300 MG: 300 CAPSULE ORAL at 08:17

## 2020-02-05 RX ADMIN — PROMETHAZINE HYDROCHLORIDE 12.5 MG: 25 TABLET ORAL at 15:40

## 2020-02-05 RX ADMIN — SIMETHICONE CHEW TAB 80 MG 80 MG: 80 TABLET ORAL at 21:17

## 2020-02-05 RX ADMIN — HYDROMORPHONE HYDROCHLORIDE 2 MG: 2 TABLET ORAL at 15:30

## 2020-02-05 RX ADMIN — SERTRALINE HYDROCHLORIDE 100 MG: 100 TABLET ORAL at 08:16

## 2020-02-05 RX ADMIN — PANTOPRAZOLE SODIUM 40 MG: 40 TABLET, DELAYED RELEASE ORAL at 05:23

## 2020-02-05 NOTE — PROGRESS NOTES
Discharge Planning Assessment  [facility]     Patient Name: Mariel Fox  MRN: [id number]  Today's Date: 2/5/2020    Admit Date: 2/4/2020    Discharge Needs Assessment     Row Name 02/05/20 1228       Living Environment    Lives With  child(joyce), dependent;spouse Pt resides in [facility]    Name(s) of Who Lives With Patient  - Tommie     Current Living Arrangements  home/apartment/condo    Primary Care Provided by  self    Provides Primary Care For  no one;child(joyce)    Family Caregiver if Needed  spouse    Family Caregiver Names  Tommie    Quality of Family Relationships  helpful;involved;supportive    Able to Return to Prior Arrangements  yes       Resource/Environmental Concerns    Resource/Environmental Concerns  none       Transition Planning    Patient/Family Anticipates Transition to  home with family    Patient/Family Anticipated Services at Transition  none    Transportation Anticipated  family or friend will provide       Discharge Needs Assessment    Readmission Within the Last 30 Days  no previous admission in last 30 days    Concerns to be Addressed  no discharge needs identified;denies needs/concerns at this time    Equipment Currently Used at Home  none    Anticipated Changes Related to Illness  none    Equipment Needed After Discharge  none        Discharge Plan     Row Name 02/05/20 1891       Plan    Plan  home    Provided post acute provider list?  N/A    Patient/Family in Agreement with Plan  yes    Plan Comments  CM spoke with pt at bedside. Pt resides in [facility] with her  Tommie and 4 children two age 13, 11 and 3 yo. Pt is independent of adls and denies use of DME and is not current with home health or outpatient services. Pt plans to return home and denies needs at this time. CM will continue to follow.     Final Discharge Disposition Code  01 - home or self-care        Destination      Coordination has not been started for this encounter.      Durable Medical Equipment       Coordination has not been started for this encounter.      Dialysis/Infusion      Coordination has not been started for this encounter.      Home Medical Care      Coordination has not been started for this encounter.      Therapy      Coordination has not been started for this encounter.      Community Resources      Coordination has not been started for this encounter.          Demographic Summary     Row Name 02/05/20 1227       General Information    Referral Source  admission list    Reason for Consult  discharge planning    Preferred Language  English    General Information Comments  PCP- Lauro Coronel       Contact Information    Permission Granted to Share Info With      Contact Information Comments  [phone]        Functional Status     Row Name 02/05/20 1227       Functional Status    Usual Activity Tolerance  good    Current Activity Tolerance  moderate       Functional Status, IADL    Medications  independent    Meal Preparation  independent    Housekeeping  independent    Laundry  independent    Shopping  independent       Employment/    Employment/ Comments  Pt confirms she has [employer] insurance, denies concerns or disruption in coverage. Pt has prescription drug coverage and denies issues obtaining or affording current medications.         Psychosocial    No documentation.       Abuse/Neglect    No documentation.       Legal    No documentation.       Substance Abuse    No documentation.       Patient Forms    No documentation.           Shelly Mendoza RN

## 2020-02-05 NOTE — PLAN OF CARE
Patient's VSS, UOP adequate.  Ambulated in [place] this shift.  Using IS correctly.  C/o both nausea and pain.  SCD's in place.  Spouse at bedside.  Will continue to monitor.

## 2020-02-05 NOTE — PLAN OF CARE
VSS, inadequate protein intake, ambulating in hallways, tolerating oral pain & nausea medications, voiding

## 2020-02-05 NOTE — PROGRESS NOTES
"Cc: POD#1 LSG  \"Nausea, sore\"    Her  is in the room.  She complains of nausea and poor oral intake.  3 capfuls of protein so far today.  She is burping, no bowel movement or flatus.  She is ambulating and voiding well.  No pulmonary complaints.  Spirometer 1750 with encouragement, she was not using it much or in the right manner prior and says she was getting it to around 2000.  No fever or tachycardia pulse 86 respirations 18 blood pressure 138/80 UO 1850 - 1700 she is in no apparent distress.  Lungs clear to auscultation.  Heart regular rate and rhythm.  Abdomen soft, nontender, nondistended, bowel sounds hypoactive.  Wounds look okay.  Extremities have no edema.  CMP normal except for glucose of 145 creatinine of 0.55 ALT of 41 iron is 31 white blood count 15 with 87 segs 8 lymphs 5 monocytes H&H 13.1 and 30.7 hemoglobin A1c normal at 5.40 upper GI images reviewed and unremarkable report unremarkable except for small volume of free intraperitoneal air consistent with postoperative day #1 laparoscopy.    Impression: Doing okay postoperative day #1 sleeve gastrectomy except for nausea and suboptimal oral intake.  Iron deficiency without evidence of bleeding on Lovenox.    Plan: Continue liquids, out of bed, pulmonary toilet, anti-emetics as needed.  IV iron.  Hopefully she will feel better and be able to go home tomorrow.  See orders  "

## 2020-02-06 VITALS
SYSTOLIC BLOOD PRESSURE: 114 MMHG | TEMPERATURE: 98.3 F | OXYGEN SATURATION: 93 % | BODY MASS INDEX: 37.74 KG/M2 | RESPIRATION RATE: 18 BRPM | WEIGHT: 249 LBS | HEIGHT: 68 IN | HEART RATE: 77 BPM | DIASTOLIC BLOOD PRESSURE: 63 MMHG

## 2020-02-06 LAB
ALBUMIN SERPL-MCNC: 4 G/DL (ref 3.5–5.2)
ALBUMIN/GLOB SERPL: 1.3 G/DL
ALP SERPL-CCNC: 72 U/L (ref 39–117)
ALT SERPL W P-5'-P-CCNC: 29 U/L (ref 1–33)
ANION GAP SERPL CALCULATED.3IONS-SCNC: 9 MMOL/L (ref 5–15)
AST SERPL-CCNC: 20 U/L (ref 1–32)
BASOPHILS # BLD AUTO: 0.05 10*3/MM3 (ref 0–0.2)
BASOPHILS NFR BLD AUTO: 0.4 % (ref 0–1.5)
BILIRUB SERPL-MCNC: 0.8 MG/DL (ref 0.2–1.2)
BUN BLD-MCNC: 6 MG/DL (ref 6–20)
BUN/CREAT SERPL: 9 (ref 7–25)
CALCIUM SPEC-SCNC: 8.7 MG/DL (ref 8.6–10.5)
CHLORIDE SERPL-SCNC: 103 MMOL/L (ref 98–107)
CO2 SERPL-SCNC: 26 MMOL/L (ref 22–29)
CREAT BLD-MCNC: 0.67 MG/DL (ref 0.57–1)
CYTO UR: NORMAL
DEPRECATED RDW RBC AUTO: 48.7 FL (ref 37–54)
EOSINOPHIL # BLD AUTO: 0.06 10*3/MM3 (ref 0–0.4)
EOSINOPHIL NFR BLD AUTO: 0.4 % (ref 0.3–6.2)
ERYTHROCYTE [DISTWIDTH] IN BLOOD BY AUTOMATED COUNT: 13.8 % (ref 12.3–15.4)
GFR SERPL CREATININE-BSD FRML MDRD: 99 ML/MIN/1.73
GLOBULIN UR ELPH-MCNC: 3 GM/DL
GLUCOSE BLD-MCNC: 106 MG/DL (ref 65–99)
HCT VFR BLD AUTO: 37.3 % (ref 34–46.6)
HGB BLD-MCNC: 12.2 G/DL (ref 12–15.9)
IMM GRANULOCYTES # BLD AUTO: 0.13 10*3/MM3 (ref 0–0.05)
IMM GRANULOCYTES NFR BLD AUTO: 1 % (ref 0–0.5)
LAB AP CASE REPORT: NORMAL
LAB AP CLINICAL INFORMATION: NORMAL
LYMPHOCYTES # BLD AUTO: 2.79 10*3/MM3 (ref 0.7–3.1)
LYMPHOCYTES NFR BLD AUTO: 20.9 % (ref 19.6–45.3)
MCH RBC QN AUTO: 31.5 PG (ref 26.6–33)
MCHC RBC AUTO-ENTMCNC: 32.7 G/DL (ref 31.5–35.7)
MCV RBC AUTO: 96.4 FL (ref 79–97)
MONOCYTES # BLD AUTO: 1.06 10*3/MM3 (ref 0.1–0.9)
MONOCYTES NFR BLD AUTO: 7.9 % (ref 5–12)
NEUTROPHILS # BLD AUTO: 9.29 10*3/MM3 (ref 1.7–7)
NEUTROPHILS NFR BLD AUTO: 69.4 % (ref 42.7–76)
NRBC BLD AUTO-RTO: 0 /100 WBC (ref 0–0.2)
PATH REPORT.FINAL DX SPEC: NORMAL
PATH REPORT.GROSS SPEC: NORMAL
PLATELET # BLD AUTO: 353 10*3/MM3 (ref 140–450)
PMV BLD AUTO: 9.5 FL (ref 6–12)
POTASSIUM BLD-SCNC: 4 MMOL/L (ref 3.5–5.2)
PROT SERPL-MCNC: 7 G/DL (ref 6–8.5)
RBC # BLD AUTO: 3.87 10*6/MM3 (ref 3.77–5.28)
SODIUM BLD-SCNC: 138 MMOL/L (ref 136–145)
WBC NRBC COR # BLD: 13.38 10*3/MM3 (ref 3.4–10.8)

## 2020-02-06 PROCEDURE — 94799 UNLISTED PULMONARY SVC/PX: CPT

## 2020-02-06 PROCEDURE — 80053 COMPREHEN METABOLIC PANEL: CPT | Performed by: SURGERY

## 2020-02-06 PROCEDURE — 25010000002 ENOXAPARIN PER 10 MG: Performed by: SURGERY

## 2020-02-06 PROCEDURE — 99024 POSTOP FOLLOW-UP VISIT: CPT | Performed by: SURGERY

## 2020-02-06 PROCEDURE — 25010000002 PROMETHAZINE PER 50 MG: Performed by: SURGERY

## 2020-02-06 PROCEDURE — 85025 COMPLETE CBC W/AUTO DIFF WBC: CPT | Performed by: SURGERY

## 2020-02-06 PROCEDURE — 63710000001 PROMETHAZINE PER 25 MG: Performed by: SURGERY

## 2020-02-06 RX ORDER — OMEPRAZOLE 40 MG/1
40 CAPSULE, DELAYED RELEASE ORAL DAILY
Qty: 60 CAPSULE | Refills: 1 | Status: SHIPPED | OUTPATIENT
Start: 2020-02-06 | End: 2020-02-07 | Stop reason: SDUPTHER

## 2020-02-06 RX ORDER — ONDANSETRON 4 MG/1
4 TABLET, FILM COATED ORAL EVERY 4 HOURS PRN
Qty: 12 TABLET | Refills: 0 | Status: SHIPPED | OUTPATIENT
Start: 2020-02-06 | End: 2020-02-07 | Stop reason: SDUPTHER

## 2020-02-06 RX ORDER — ACETAMINOPHEN 325 MG/1
650 TABLET ORAL EVERY 6 HOURS PRN
Status: DISCONTINUED | OUTPATIENT
Start: 2020-02-06 | End: 2020-02-06 | Stop reason: HOSPADM

## 2020-02-06 RX ORDER — HYDROCODONE BITARTRATE AND ACETAMINOPHEN 7.5; 325 MG/1; MG/1
1 TABLET ORAL EVERY 4 HOURS PRN
Qty: 12 TABLET | Refills: 0 | Status: SHIPPED | OUTPATIENT
Start: 2020-02-06 | End: 2020-02-16

## 2020-02-06 RX ORDER — PROMETHAZINE HYDROCHLORIDE 12.5 MG/1
12.5 TABLET ORAL EVERY 4 HOURS PRN
Qty: 20 TABLET | Refills: 0 | Status: SHIPPED | OUTPATIENT
Start: 2020-02-06 | End: 2020-09-02

## 2020-02-06 RX ADMIN — ENOXAPARIN SODIUM 40 MG: 40 INJECTION SUBCUTANEOUS at 08:10

## 2020-02-06 RX ADMIN — ACETAMINOPHEN 650 MG: 325 TABLET, FILM COATED ORAL at 13:54

## 2020-02-06 RX ADMIN — GABAPENTIN 100 MG: 300 CAPSULE ORAL at 11:36

## 2020-02-06 RX ADMIN — ATORVASTATIN CALCIUM 10 MG: 10 TABLET, FILM COATED ORAL at 08:10

## 2020-02-06 RX ADMIN — SERTRALINE HYDROCHLORIDE 100 MG: 100 TABLET ORAL at 08:11

## 2020-02-06 RX ADMIN — PANTOPRAZOLE SODIUM 40 MG: 40 TABLET, DELAYED RELEASE ORAL at 06:04

## 2020-02-06 RX ADMIN — OXYCODONE HYDROCHLORIDE 5 MG: 5 TABLET ORAL at 11:37

## 2020-02-06 RX ADMIN — OXYCODONE HYDROCHLORIDE 5 MG: 5 TABLET ORAL at 04:01

## 2020-02-06 RX ADMIN — GABAPENTIN 300 MG: 300 CAPSULE ORAL at 08:11

## 2020-02-06 RX ADMIN — OXYCODONE HYDROCHLORIDE 5 MG: 5 TABLET ORAL at 17:22

## 2020-02-06 RX ADMIN — GABAPENTIN 100 MG: 300 CAPSULE ORAL at 17:22

## 2020-02-06 RX ADMIN — ONDANSETRON HYDROCHLORIDE 4 MG: 4 TABLET, FILM COATED ORAL at 04:01

## 2020-02-06 RX ADMIN — PROMETHAZINE HYDROCHLORIDE 12.5 MG: 25 INJECTION INTRAMUSCULAR; INTRAVENOUS at 08:11

## 2020-02-06 RX ADMIN — PROMETHAZINE HYDROCHLORIDE 12.5 MG: 25 TABLET ORAL at 17:40

## 2020-02-06 RX ADMIN — ACETAMINOPHEN 1000 MG: 500 TABLET, FILM COATED ORAL at 08:11

## 2020-02-06 RX ADMIN — AMLODIPINE BESYLATE 5 MG: 5 TABLET ORAL at 08:11

## 2020-02-06 RX ADMIN — GABAPENTIN 300 MG: 300 CAPSULE ORAL at 17:24

## 2020-02-06 NOTE — PLAN OF CARE
Problem: Patient Care Overview  Goal: Plan of Care Review  Outcome: Ongoing (interventions implemented as appropriate)  Flowsheets  Taken 2/6/2020 0520 by Millicent Gabriel RN  Progress: improving  Taken 2/6/2020 1533 by Pavithra Valentine RN  Plan of Care Reviewed With: patient  Outcome Summary: patient states nausea better, up walking, using incentive spirometer, states feeling better  Goal: Individualization and Mutuality  Outcome: Ongoing (interventions implemented as appropriate)  Goal: Discharge Needs Assessment  Outcome: Ongoing (interventions implemented as appropriate)  Goal: Interprofessional Rounds/Family Conf  Outcome: Ongoing (interventions implemented as appropriate)     Problem: Bariatric Surgery (Adult,Pediatric)  Goal: Signs and Symptoms of Listed Potential Problems Will be Absent, Minimized or Managed (Bariatric Surgery)  Outcome: Ongoing (interventions implemented as appropriate)  Goal: Anesthesia/Sedation Recovery  Outcome: Ongoing (interventions implemented as appropriate)

## 2020-02-06 NOTE — PLAN OF CARE
Problem: Patient Care Overview  Goal: Plan of Care Review  Outcome: Ongoing (interventions implemented as appropriate)  Flowsheets  Taken 2/6/2020 0520  Progress: improving  Outcome Summary: Patient has been resting in bed throughout the evening. VSS and on RA. Complained of pain x2 controlled with PO pain meds. Nausea complaints controlled with PO meds. Patient states she gets nausea when she gets out of bed. Patient voiding without difficutly. Will continue to monitor.  Taken 2/5/2020 2000  Plan of Care Reviewed With: patient     Problem: Bariatric Surgery (Adult,Pediatric)  Goal: Signs and Symptoms of Listed Potential Problems Will be Absent, Minimized or Managed (Bariatric Surgery)  Outcome: Ongoing (interventions implemented as appropriate)  Flowsheets (Taken 2/6/2020 0520)  Problems Assessed (Bariatric Surgery): all  Problems Present (Bariatric Surgery): pain; postoperative nausea and vomiting; bowel motility decreased; situational response

## 2020-02-06 NOTE — PROGRESS NOTES
"Cc: POD#2  LSG  \"nausea\"    A female friend is in the room.  She still complains of nausea but wants to go home.  She says she misunderstood me and thought she only had a drink 4 capfuls of protein shake a day not 4 shakes.  She says she is keeping down water and other things well.  She is ambulating and voiding well.  No pulmonary complaints.  Spirometer 1500.  No bowel movement or flatus.  No fever or tachycardia pulse 77 respirations 18 blood pressure 114/63 UO 3900 she is in no apparent distress.  Lungs clear to auscultation.  Heart regular rhythm.  Abdomen soft, nontender, nondistended, bowel sounds hypoactive.  Wounds look okay.  CMP normal except for glucose of 106 white blood count 13.4 with a normal differential H&H 12.2 and 37.3    Impression: Doing okay postop day #2 sleeve gastrectomy    Plan: Discharge home.  Discharge instructions were discussed.  Lovenox.  See orders  "

## 2020-02-07 ENCOUNTER — READMISSION MANAGEMENT (OUTPATIENT)
Dept: CALL CENTER | Facility: HOSPITAL | Age: 40
End: 2020-02-07

## 2020-02-07 RX ORDER — OMEPRAZOLE 40 MG/1
40 CAPSULE, DELAYED RELEASE ORAL DAILY
Qty: 60 CAPSULE | Refills: 1 | Status: SHIPPED | OUTPATIENT
Start: 2020-02-07 | End: 2020-04-07

## 2020-02-07 RX ORDER — ONDANSETRON 4 MG/1
4 TABLET, FILM COATED ORAL EVERY 4 HOURS PRN
Qty: 12 TABLET | Refills: 0 | Status: SHIPPED | OUTPATIENT
Start: 2020-02-07 | End: 2020-09-02

## 2020-02-07 NOTE — OUTREACH NOTE
Prep Survey      Responses   Facility patient discharged from?  [facility]   Is patient eligible?  Yes   Discharge diagnosis  gastric sleve   Does the patient have one of the following disease processes/diagnoses(primary or secondary)?  General Surgery   Does the patient have Home health ordered?  No   Is there a DME ordered?  No   Prep survey completed?  Yes          Aspen Porter RN

## 2020-02-07 NOTE — TELEPHONE ENCOUNTER
Pt called in stating that when she was released from the hospital she did not get her Zofran and Omperazole prescriptions. Pt is wanting these sent to her pharmacy. Please advise, thank you.

## 2020-02-08 ENCOUNTER — READMISSION MANAGEMENT (OUTPATIENT)
Dept: CALL CENTER | Facility: HOSPITAL | Age: 40
End: 2020-02-08

## 2020-02-08 NOTE — OUTREACH NOTE
General Surgery Week 1 Survey      Responses   Facility patient discharged from?  [facility]   Does the patient have one of the following disease processes/diagnoses(primary or secondary)?  General Surgery   Is there a successful TCM telephone encounter documented?  No   Week 1 attempt successful?  Yes   Call start time  1842   Call end time  1846   Discharge diagnosis  gastric sleve   Person spoke with today (if not patient) and relationship  Tommie-spouse   Meds reviewed with patient/caregiver?  Yes   Is the patient having any side effects they believe may be caused by any medication additions or changes?  No   Does the patient have all medications related to this admission filled (includes all antibiotics, pain medications, etc.)  Yes   Is the patient taking all medications as directed (includes completed medication regime)?  Yes   Does the patient have a follow up appointment scheduled with their surgeon?  Yes [2/11/20]   Has the patient kept scheduled appointments due by today?  N/A   Psychosocial issues?  No   Nursing interventions  Reviewed instructions with patient, Educated on MyChart   What is the patient's perception of their health status since discharge?  Improving   Nursing interventions  Nurse provided patient education   Is the patient /caregiver able to teach back basic post-op care?  Drive as instructed by MD in discharge instructions, Take showers only when approved by MD-sponge bathe until then, No tub bath, swimming, or hot tub until instructed by MD, Keep incision areas clean,dry and protected, Lifting as instructed by MD in discharge instructions   Is the patient/caregiver able to teach back signs and symptoms of incisional infection?  Increased redness, swelling or pain at the incisonal site, Increased drainage or bleeding, Fever, Incisional warmth, Pus or odor from incision   Is the patient/caregiver able to teach back steps to recovery at home?  Set small, achievable goals for return to  baseline health, Rest and rebuild strength, gradually increase activity   If the patient is a current smoker, are they able to teach back resources for cessation?  -- [Nonsmoker]   Is the patient/caregiver able to teach back the hierarchy of who to call/visit for symptoms/problems? PCP, Specialist, Home health nurse, Urgent Care, ED, 911  Yes   Week 1 call completed?  Yes          Carrie Sheridan RN

## 2020-02-11 ENCOUNTER — OFFICE VISIT (OUTPATIENT)
Dept: BARIATRICS/WEIGHT MGMT | Facility: CLINIC | Age: 40
End: 2020-02-11

## 2020-02-11 VITALS
SYSTOLIC BLOOD PRESSURE: 116 MMHG | OXYGEN SATURATION: 99 % | DIASTOLIC BLOOD PRESSURE: 68 MMHG | RESPIRATION RATE: 18 BRPM | TEMPERATURE: 97.6 F | HEIGHT: 68 IN | HEART RATE: 76 BPM | BODY MASS INDEX: 36.37 KG/M2 | WEIGHT: 240 LBS

## 2020-02-11 DIAGNOSIS — Z90.3 POSTGASTRECTOMY MALABSORPTION: ICD-10-CM

## 2020-02-11 DIAGNOSIS — Z13.0 SCREENING, IRON DEFICIENCY ANEMIA: ICD-10-CM

## 2020-02-11 DIAGNOSIS — K91.2 POSTGASTRECTOMY MALABSORPTION: ICD-10-CM

## 2020-02-11 DIAGNOSIS — R53.83 FATIGUE, UNSPECIFIED TYPE: Primary | ICD-10-CM

## 2020-02-11 DIAGNOSIS — K76.0 FATTY LIVER: ICD-10-CM

## 2020-02-11 DIAGNOSIS — Z13.21 MALNUTRITION SCREEN: ICD-10-CM

## 2020-02-11 DIAGNOSIS — Z98.84 STATUS POST BARIATRIC SURGERY: ICD-10-CM

## 2020-02-11 DIAGNOSIS — E78.5 HYPERLIPIDEMIA, UNSPECIFIED HYPERLIPIDEMIA TYPE: ICD-10-CM

## 2020-02-11 DIAGNOSIS — E55.9 HYPOVITAMINOSIS D: ICD-10-CM

## 2020-02-11 DIAGNOSIS — I10 HYPERTENSION, UNSPECIFIED TYPE: ICD-10-CM

## 2020-02-11 PROCEDURE — 99024 POSTOP FOLLOW-UP VISIT: CPT | Performed by: SURGERY

## 2020-02-11 RX ORDER — URSODIOL 300 MG/1
300 CAPSULE ORAL 2 TIMES DAILY
Qty: 60 CAPSULE | Refills: 5 | Status: SHIPPED | OUTPATIENT
Start: 2020-02-11 | End: 2020-02-11

## 2020-02-11 RX ORDER — URSODIOL 300 MG/1
300 CAPSULE ORAL 2 TIMES DAILY
Qty: 60 CAPSULE | Refills: 5 | Status: SHIPPED | OUTPATIENT
Start: 2020-02-11 | End: 2020-03-12

## 2020-02-11 NOTE — PROGRESS NOTES
[facility] Bariatric Surgery  [address]    [facility] [id number] [phone]      Patient Name:  Mariel Fox.  :  1980      Date of Visit: 2020      Reason for Visit:  POD #7    HPI:  Mariel Fox is a 39 y.o. female s/p 20 LSG by Dr. Ojeda    Doing well.  No issues/concerns. Denies dysphagia, nausea, vomiting, abdominal pain, pulmonary issues and fevers.  Has a slight heartburn sensation when she drinks too fast.  Tolerating diet progression - on stage 1.  Getting 100 g prot/day.  Drinking 64 fluid oz/day.  Taking Patches: bariatric multipack.  On Omeprazole and Lovenox.  Holding ASA , NSAIDs  and Steroids.  Ambulating.     Presurgery weight: 249 pounds.  Today's weight is 109 kg (240 lb) pounds, today's  Body mass index is 37.03 kg/m²., and her weight loss since surgery is 9 pounds.       Path reviewed with patient:  Final Diagnosis   STOMACH, SUBTOTAL GASTRECTOMY:  Gastric tissue with on significant histopathologic change.   Negative for intestinal metaplasia, dysplasia, or significant inflammation.  No Helicobacter pylori-like organisms identified on routine stains.         Past Medical History:   Diagnosis Date   • Anxiety and depression    • Chronic back pain     last steroid injection    • Dyspnea on exertion    • Endometriosis    • Fatigue    • Fatty liver     follows w/ GI ([place]), Mom w/ hx NUNES Cirrhosis, denies prior liver bx   • Hyperlipidemia    • Hypertension    • Melanoma in situ (CMS/HCC)     (R) forearm, resected, no subsequet tx, follows w/ derm   • MTHFR mutation (CMS/HCC)     hx miscarriage x 2, no hx DVT/PE, takes ASA81 mg qod   • Obesity    • PCOS (polycystic ovarian syndrome)     on Spironolactone   • Psoriatic arthritis (CMS/HCC)     follows w/ Rheumatology ([place]), on Enbrel/Gabapentin + prn Ibupfroen     Past Surgical History:   Procedure Laterality Date   • D&C HYSTEROSCOPY      x 5   • ENDOSCOPY N/A 2020     Procedure: ESOPHAGOGASTRODUODENOSCOPY;  Surgeon: Shayy Ojeda MD;  Location:  [facility];  Service: Bariatric;  Laterality: N/A;   • GASTRIC SLEEVE LAPAROSCOPIC N/A 2/4/2020    Procedure: GASTRIC SLEEVE LAPAROSCOPIC;  Surgeon: Shayy Ojeda MD;  Location:  [facility];  Service: Bariatric;  Laterality: N/A;   • SALPINGECTOMY Left 2013    ectopic pregnancy   • SKIN SURGERY      wide local excision right forearm melanoma in situ, also another for dysplastic nevus.   • TARSAL TUNNEL RELEASE Right 2015    (R) foot   • TONSILLECTOMY  1990     Outpatient Medications Marked as Taking for the 2/11/20 encounter (Office Visit) with Shayy Ojeda MD   Medication Sig Dispense Refill   • amLODIPine (NORVASC) 5 MG tablet Take 5 mg by mouth Daily.  0   • enoxaparin (LOVENOX) 40 MG/0.4ML solution syringe Inject 0.4 mL under the skin into the appropriate area as directed Daily for 21 days. AFTER discharge home from surgery 8.4 mL 0   • gabapentin (NEURONTIN) 300 MG capsule Take 300 mg by mouth 3 (Three) Times a Day.  0   • HYDROcodone-acetaminophen (NORCO) 7.5-325 MG per tablet Take 1 tablet by mouth Every 4 (Four) Hours As Needed for Moderate Pain  for up to 10 days. 12 tablet 0   • omeprazole (PrilOSEC) 40 MG capsule Take 1 capsule by mouth Daily for 60 days. 60 capsule 1   • ondansetron (ZOFRAN) 4 MG tablet Take 1 tablet by mouth Every 4 (Four) Hours As Needed for Nausea. 12 tablet 0   • promethazine (PHENERGAN) 12.5 MG tablet Take 1 tablet by mouth Every 4 (Four) Hours As Needed for Nausea. 20 tablet 0   • sertraline (ZOLOFT) 100 MG tablet Take 100 mg by mouth Daily.  2   • simvastatin (ZOCOR) 20 MG tablet Take 20 mg by mouth every night at bedtime.  1   • spironolactone (ALDACTONE) 50 MG tablet Take 150 mg by mouth 2 (Two) Times a Day.       Allergies   Allergen Reactions   • Adhesive Tape Itching and Rash       Social History     Socioeconomic History   • Marital status:      Spouse name: Not  "on file   • Number of children: Not on file   • Years of education: Not on file   • Highest education level: Not on file   Tobacco Use   • Smoking status: Never Smoker   • Smokeless tobacco: Never Used   Substance and Sexual Activity   • Alcohol use: No     Frequency: Never   • Drug use: No   • Sexual activity: Defer   Social History Narrative    Living in [place], [place] w/  and 4 children.  [facility] Outpatient [facility] - [facility].  She is an occupational therapist and owner.         /68 (BP Location: Left arm, Patient Position: Sitting, Cuff Size: Large Adult)   Pulse 76   Temp 97.6 °F (36.4 °C) (Temporal)   Resp 18   Ht 171.5 cm (67.5\")   Wt 109 kg (240 lb)   LMP 01/26/2020   SpO2 99%   BMI 37.03 kg/m²   Physical Exam   Constitutional: She is oriented to person, place, and time. She appears well-developed and well-nourished. No distress.   HENT:   Head: Normocephalic and atraumatic.   Mouth/Throat: No oropharyngeal exudate.   Eyes: Pupils are equal, round, and reactive to light. Conjunctivae and EOM are normal.   Pulmonary/Chest: Effort normal. No respiratory distress.   Abdominal: Soft. She exhibits no distension.   Incisions are well-healing without induration, erythema, fluctuance, or drainage.       Musculoskeletal: She exhibits no edema.   No edema, negative b/l Leonora's sign   Neurological: She is alert and oriented to person, place, and time. No cranial nerve deficit.   Skin: Skin is warm and dry. She is not diaphoretic. No pallor.   Psychiatric: She has a normal mood and affect. Her behavior is normal. Thought content normal.         Assessment:   POD # 7      Plan:  Doing well. Continue to advance diet per manual.  Increase protein intake to 100g/day.  Actigall Rx given.  Increase exercise/activity as tolerated.  Reviewed lifting restrictions, nothing >25 lbs x 2 more weeks.  Continue vitamins.  Continue PPI.  Continue to avoid ASA/NSAIDs/Steroids x 6 weeks postop.  Call " w/ problems/concerns.    The patient was instructed to follow up in 3 weeks, sooner if needed.     Shayy Ojeda MD

## 2020-02-12 NOTE — DISCHARGE SUMMARY
Discharge Summary    Patient name: Mariel Fox    Medical record number: [id number]    Admission date: 2/4/2020  Discharge date:  2/6/2020    Attending physician: Shayy Ojeda MD    Primary care physician: Lauro Coronel APRN    Condition on discharge: Stable    Primary Diagnoses:  Morbid obesity with co-morbidities    Operative Procedure:  2/4/20 laparoscopic sleeve gastrectomy    Hospital Course: The patient is a very pleasant 39 y.o. female that was admitted to the hospital after elective laparoscopic sleeve gastrectomy.  On POD#1, UGI was without obstruction or leak. The patient had some had some nausea, but by POD#2, was ambulating well, tolerating stage 1 diet, and felt ready to be discharged.  Please see daily progress notes for full details of hospital stay.  Mariel Fox was discharged home in good condition with instructions to follow up in the office in 1 week.      Discharge medications:      Discharge Medications      New Medications      Instructions Start Date   HYDROcodone-acetaminophen 7.5-325 MG per tablet  Commonly known as:  NORCO   1 tablet, Oral, Every 4 Hours PRN      promethazine 12.5 MG tablet  Commonly known as:  PHENERGAN   12.5 mg, Oral, Every 4 Hours PRN         Continue These Medications      Instructions Start Date   amLODIPine 5 MG tablet  Commonly known as:  NORVASC   5 mg, Oral, Daily      enoxaparin 40 MG/0.4ML solution syringe  Commonly known as:  LOVENOX   40 mg, Subcutaneous, Every 24 Hours Scheduled, AFTER discharge home from surgery      gabapentin 300 MG capsule  Commonly known as:  NEURONTIN   300 mg, Oral, 3 Times Daily      sertraline 100 MG tablet  Commonly known as:  ZOLOFT   100 mg, Oral, Daily      simvastatin 20 MG tablet  Commonly known as:  ZOCOR   20 mg, Oral, Every Night at Bedtime      spironolactone 50 MG tablet  Commonly known as:  ALDACTONE   150 mg, Oral, 2 Times Daily         Stop These Medications    ENBREL SURECLICK 50 MG/ML solution  auto-injector  Generic drug:  Etanercept            Discharge instructions:  Per Bariatric manual      Follow-up appointment: Follow up with Bariatric PA in the office in 1 week.

## 2020-02-17 ENCOUNTER — READMISSION MANAGEMENT (OUTPATIENT)
Dept: CALL CENTER | Facility: HOSPITAL | Age: 40
End: 2020-02-17

## 2020-02-17 NOTE — OUTREACH NOTE
General Surgery Week 2 Survey      Responses   Facility patient discharged from?  [facility]   Does the patient have one of the following disease processes/diagnoses(primary or secondary)?  General Surgery   Week 2 attempt successful?  No   Unsuccessful attempts  Attempt 1          Caroline Gomez RN

## 2020-02-18 ENCOUNTER — READMISSION MANAGEMENT (OUTPATIENT)
Dept: CALL CENTER | Facility: HOSPITAL | Age: 40
End: 2020-02-18

## 2020-02-18 NOTE — OUTREACH NOTE
General Surgery Week 2 Survey      Responses   Facility patient discharged from?  [facility]   Does the patient have one of the following disease processes/diagnoses(primary or secondary)?  General Surgery   Week 2 attempt successful?  No   Unsuccessful attempts  Attempt 2          Carrie Sheridan RN

## 2020-02-20 ENCOUNTER — READMISSION MANAGEMENT (OUTPATIENT)
Dept: CALL CENTER | Facility: HOSPITAL | Age: 40
End: 2020-02-20

## 2020-02-20 NOTE — OUTREACH NOTE
General Surgery Week 3 Survey      Responses   Facility patient discharged from?  [facility]   Does the patient have one of the following disease processes/diagnoses(primary or secondary)?  General Surgery   Week 3 attempt successful?  No   Unsuccessful attempts  Attempt 1          Nancy Rodrigez RN

## 2020-02-21 ENCOUNTER — READMISSION MANAGEMENT (OUTPATIENT)
Dept: CALL CENTER | Facility: HOSPITAL | Age: 40
End: 2020-02-21

## 2020-02-21 NOTE — OUTREACH NOTE
General Surgery Week 3 Survey      Responses   Facility patient discharged from?  [facility]   Does the patient have one of the following disease processes/diagnoses(primary or secondary)?  General Surgery   Week 3 attempt successful?  No   Unsuccessful attempts  Attempt 2          Nyasia Jarrett RN

## 2020-03-09 ENCOUNTER — OFFICE VISIT (OUTPATIENT)
Dept: BARIATRICS/WEIGHT MGMT | Facility: CLINIC | Age: 40
End: 2020-03-09

## 2020-03-09 VITALS
WEIGHT: 230 LBS | HEIGHT: 68 IN | BODY MASS INDEX: 34.86 KG/M2 | TEMPERATURE: 96.9 F | DIASTOLIC BLOOD PRESSURE: 64 MMHG | HEART RATE: 81 BPM | RESPIRATION RATE: 18 BRPM | OXYGEN SATURATION: 98 % | SYSTOLIC BLOOD PRESSURE: 114 MMHG

## 2020-03-09 DIAGNOSIS — I10 HYPERTENSION, UNSPECIFIED TYPE: ICD-10-CM

## 2020-03-09 DIAGNOSIS — Z13.0 SCREENING, IRON DEFICIENCY ANEMIA: ICD-10-CM

## 2020-03-09 DIAGNOSIS — R53.83 FATIGUE, UNSPECIFIED TYPE: Primary | ICD-10-CM

## 2020-03-09 DIAGNOSIS — Z98.84 STATUS POST BARIATRIC SURGERY: ICD-10-CM

## 2020-03-09 DIAGNOSIS — E78.5 HYPERLIPIDEMIA, UNSPECIFIED HYPERLIPIDEMIA TYPE: ICD-10-CM

## 2020-03-09 DIAGNOSIS — Z13.21 MALNUTRITION SCREEN: ICD-10-CM

## 2020-03-09 DIAGNOSIS — Z90.3 POSTGASTRECTOMY MALABSORPTION: ICD-10-CM

## 2020-03-09 DIAGNOSIS — K76.0 FATTY LIVER: ICD-10-CM

## 2020-03-09 DIAGNOSIS — K91.2 POSTGASTRECTOMY MALABSORPTION: ICD-10-CM

## 2020-03-09 DIAGNOSIS — E55.9 HYPOVITAMINOSIS D: ICD-10-CM

## 2020-03-09 PROCEDURE — 99024 POSTOP FOLLOW-UP VISIT: CPT | Performed by: SURGERY

## 2020-03-09 NOTE — PROGRESS NOTES
[facility] Bariatric Surgery  [address]    [facility] [id number] [phone]      Patient Name:  Mariel Fox.  :  1980      Date of Visit: 3/9/2020      Reason for Visit:  1 month postop    HPI:  Mariel Fox is a 39 y.o. female s/p 20 LSG by Dr. Ojeda    Doing well.  No issues/concerns. Denies dysphagia and reflux.  Occasionally vomits or gets stuck if she overeats solid foods.  Still learning her limits.  Tolerating diet progression - on stage 5.  Getting 80-100g prot/day.  Drinking 64 fluid oz/day.  Taking Patches: MVI, iron, B12+, calc/Vit D.  On Omeprazole  and Actigall .  Still holding ASA , NSAIDs  and Steroids.  Exercise: not yet.       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  Holding Enbrel 8 weeks post op 20.    Presurgery weight:  249 pounds. Today's weight is 104 kg (230 lb) pounds, today's Body mass index is 35.49 kg/m²., and her weight loss since surgery is 19 pounds.       Past Medical History:   Diagnosis Date   • Anxiety and depression    • Chronic back pain     last steroid injection    • Dyspnea on exertion    • Endometriosis    • Fatigue    • Fatty liver     follows w/ GI ([place]), Mom w/ hx NUNES Cirrhosis, denies prior liver bx   • Hyperlipidemia    • Hypertension    • Melanoma in situ (CMS/HCC)     (R) forearm, resected, no subsequet tx, follows w/ derm   • MTHFR mutation (CMS/HCC)     hx miscarriage x 2, no hx DVT/PE, takes ASA81 mg qod   • Obesity    • PCOS (polycystic ovarian syndrome)     on Spironolactone   • Psoriatic arthritis (CMS/HCC)     follows w/ Rheumatology ([place]), on Enbrel/Gabapentin + prn Ibupfroen     Past Surgical History:   Procedure Laterality Date   • D&C HYSTEROSCOPY      x 5   • ENDOSCOPY N/A 2020    Procedure: ESOPHAGOGASTRODUODENOSCOPY;  Surgeon: Shayy Ojeda MD;  Location: [facility];  Service: Bariatric;  Laterality: N/A;   • GASTRIC SLEEVE LAPAROSCOPIC N/A 2020     Procedure: GASTRIC SLEEVE LAPAROSCOPIC;  Surgeon: Shayy Ojeda MD;  [facility]: [facility];  Service: Bariatric;  Laterality: N/A;   • SALPINGECTOMY Left 2013    ectopic pregnancy   • SKIN SURGERY      wide local excision right forearm melanoma in situ, also another for dysplastic nevus.   • TARSAL TUNNEL RELEASE Right 2015    (R) foot   • TONSILLECTOMY  1990     Outpatient Medications Marked as Taking for the 3/9/20 encounter (Office Visit) with Shayy Ojeda MD   Medication Sig Dispense Refill   • amLODIPine (NORVASC) 5 MG tablet Take 5 mg by mouth Daily.  0   • gabapentin (NEURONTIN) 300 MG capsule Take 300 mg by mouth 3 (Three) Times a Day.  0   • omeprazole (PrilOSEC) 40 MG capsule Take 1 capsule by mouth Daily for 60 days. 60 capsule 1   • ondansetron (ZOFRAN) 4 MG tablet Take 1 tablet by mouth Every 4 (Four) Hours As Needed for Nausea. 12 tablet 0   • promethazine (PHENERGAN) 12.5 MG tablet Take 1 tablet by mouth Every 4 (Four) Hours As Needed for Nausea. 20 tablet 0   • sertraline (ZOLOFT) 100 MG tablet Take 100 mg by mouth Daily.  2   • simvastatin (ZOCOR) 20 MG tablet Take 20 mg by mouth every night at bedtime.  1   • spironolactone (ALDACTONE) 50 MG tablet Take 150 mg by mouth 2 (Two) Times a Day.     • ursodiol (ACTIGALL) 300 MG capsule Take 1 capsule by mouth 2 (Two) Times a Day for 30 days. 60 capsule 5     Allergies   Allergen Reactions   • Adhesive Tape Itching and Rash       Social History     Socioeconomic History   • Marital status:      Spouse name: Not on file   • Number of children: Not on file   • Years of education: Not on file   • Highest education level: Not on file   Tobacco Use   • Smoking status: Never Smoker   • Smokeless tobacco: Never Used   Substance and Sexual Activity   • Alcohol use: No     Frequency: Never   • Drug use: No   • Sexual activity: Defer   Social History Narrative    Living in [place], [place] w/  and 4 children.  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         /64 (BP Location: Left arm, Patient Position: Sitting, Cuff Size: Large Adult)   Pulse 81   Temp 96.9 °F (36.1 °C) (Temporal)   Resp 18   Ht 171.5 cm (67.5\")   Wt 104 kg (230 lb)   SpO2 98%   BMI 35.49 kg/m²     Physical Exam   Constitutional: She is oriented to person, place, and time. She appears well-developed and well-nourished. No distress.   HENT:   Head: Normocephalic and atraumatic.   Mouth/Throat: No oropharyngeal exudate.   Eyes: Pupils are equal, round, and reactive to light. Conjunctivae and EOM are normal.   Pulmonary/Chest: Effort normal. No respiratory distress.   Abdominal: Soft. She exhibits no distension.   Neurological: She is alert and oriented to person, place, and time. No cranial nerve deficit.   Skin: Skin is warm and dry. She is not diaphoretic. No pallor.   Psychiatric: She has a normal mood and affect. Her behavior is normal. Thought content normal.         Assessment:  1 month s/p 2/4/20 LSG by Dr. Ojeda    Plan:  Doing well.  Continue to advance diet per manual.  Continue protein >70g/day.  Encouraged good food choices - high protein, low carb.  Start routine exercise.  Routine bariatric labs ordered.  Continue vitamins w/ adjustments pending lab results.  Call w/ problems/concerns.    The patient was instructed to follow up in 2 months, sooner if needed.     Shayy Ojeda MD    "

## 2020-03-11 LAB
25(OH)D3+25(OH)D2 SERPL-MCNC: 40.6 NG/ML (ref 30–100)
ALBUMIN SERPL-MCNC: 4.1 G/DL (ref 3.5–5.2)
ALBUMIN/GLOB SERPL: 1.5 G/DL
ALP SERPL-CCNC: 85 U/L (ref 39–117)
ALT SERPL-CCNC: 16 U/L (ref 1–33)
AST SERPL-CCNC: 15 U/L (ref 1–32)
BASOPHILS # BLD AUTO: 0.05 10*3/MM3 (ref 0–0.2)
BASOPHILS NFR BLD AUTO: 0.6 % (ref 0–1.5)
BILIRUB SERPL-MCNC: 0.6 MG/DL (ref 0.2–1.2)
BUN SERPL-MCNC: 17 MG/DL (ref 6–20)
BUN/CREAT SERPL: 24.6 (ref 7–25)
CALCIUM SERPL-MCNC: 9.2 MG/DL (ref 8.6–10.5)
CHLORIDE SERPL-SCNC: 100 MMOL/L (ref 98–107)
CO2 SERPL-SCNC: 23.7 MMOL/L (ref 22–29)
CREAT SERPL-MCNC: 0.69 MG/DL (ref 0.57–1)
EOSINOPHIL # BLD AUTO: 0.14 10*3/MM3 (ref 0–0.4)
EOSINOPHIL NFR BLD AUTO: 1.6 % (ref 0.3–6.2)
ERYTHROCYTE [DISTWIDTH] IN BLOOD BY AUTOMATED COUNT: 12.8 % (ref 12.3–15.4)
FERRITIN SERPL-MCNC: 175 NG/ML (ref 13–150)
FOLATE SERPL-MCNC: 5.23 NG/ML (ref 4.78–24.2)
GLOBULIN SER CALC-MCNC: 2.7 GM/DL
GLUCOSE SERPL-MCNC: 89 MG/DL (ref 65–99)
HCT VFR BLD AUTO: 39.4 % (ref 34–46.6)
HGB BLD-MCNC: 13.3 G/DL (ref 12–15.9)
IMM GRANULOCYTES # BLD AUTO: 0.02 10*3/MM3 (ref 0–0.05)
IMM GRANULOCYTES NFR BLD AUTO: 0.2 % (ref 0–0.5)
IRON SERPL-MCNC: 57 MCG/DL (ref 37–145)
LYMPHOCYTES # BLD AUTO: 2.26 10*3/MM3 (ref 0.7–3.1)
LYMPHOCYTES NFR BLD AUTO: 25 % (ref 19.6–45.3)
Lab: NORMAL
MCH RBC QN AUTO: 30.7 PG (ref 26.6–33)
MCHC RBC AUTO-ENTMCNC: 33.8 G/DL (ref 31.5–35.7)
MCV RBC AUTO: 91 FL (ref 79–97)
METHYLMALONATE SERPL-SCNC: 83 NMOL/L (ref 0–378)
MONOCYTES # BLD AUTO: 0.81 10*3/MM3 (ref 0.1–0.9)
MONOCYTES NFR BLD AUTO: 9 % (ref 5–12)
NEUTROPHILS # BLD AUTO: 5.75 10*3/MM3 (ref 1.7–7)
NEUTROPHILS NFR BLD AUTO: 63.6 % (ref 42.7–76)
NRBC BLD AUTO-RTO: 0 /100 WBC (ref 0–0.2)
PLATELET # BLD AUTO: 303 10*3/MM3 (ref 140–450)
POTASSIUM SERPL-SCNC: 4.1 MMOL/L (ref 3.5–5.2)
PREALB SERPL-MCNC: 22 MG/DL (ref 14–35)
PROT SERPL-MCNC: 6.8 G/DL (ref 6–8.5)
RBC # BLD AUTO: 4.33 10*6/MM3 (ref 3.77–5.28)
SODIUM SERPL-SCNC: 137 MMOL/L (ref 136–145)
VIT B1 BLD-SCNC: 106.9 NMOL/L (ref 66.5–200)
WBC # BLD AUTO: 9.03 10*3/MM3 (ref 3.4–10.8)

## 2020-05-14 ENCOUNTER — TELEMEDICINE (OUTPATIENT)
Dept: BARIATRICS/WEIGHT MGMT | Facility: CLINIC | Age: 40
End: 2020-05-14

## 2020-05-14 VITALS — BODY MASS INDEX: 37.74 KG/M2 | WEIGHT: 249 LBS | HEIGHT: 68 IN

## 2020-05-14 DIAGNOSIS — Z98.84 STATUS POST BARIATRIC SURGERY: Primary | ICD-10-CM

## 2020-05-14 DIAGNOSIS — E66.9 OBESITY, CLASS II, BMI 35-39.9: ICD-10-CM

## 2020-05-14 PROCEDURE — 99214 OFFICE O/P EST MOD 30 MIN: CPT | Performed by: PHYSICIAN ASSISTANT

## 2020-05-14 NOTE — PROGRESS NOTES
[facility] Bariatric Surgery  [address]    [facility] [id number] [phone]        Patient Name:  Mariel Fox.  :  1980      Reason for Visit:   3 months postop      HPI: Mariel Fox is a 40 y.o. female s/p 20 LSG by Dr. Ojeda    This was an audio and video enabled telemedicine encounter due to COVID-19.     LOV 3/9/20- doing well.     Doing well.  No issues/concerns. Gets sick only if overeating, does not happen often, learning her limits. Denies dysphagia, reflux, nausea, vomiting and abdominal pain.  Still taking PPI but no noted reflux.  Feels that weight should be coming off faster, although pleased with progress. Getting estimated around 750 calories a day. Protein shake for breakfast, lunch: chicken, dinner: leaves our carbs.  Snacks- nuts or cheese or protein bar.    Getting 80-100g prot/day.  Drinking 64+ fluid oz/day, water and unsweet tea.  1 month labs revealed bariatric levels wnl. Taking B12, Calcium, Vit D and Patches: MVI, would like to switch to pills.  On Omeprazole .  Exercising- 4 times a week.     Presurgery weight: 249 pounds.  Today's weight is 113 kg (249 lb) pounds, today's  Body mass index is 38.42 kg/m²., and her weight loss since surgery is 36 pounds.      Past Medical History:   Diagnosis Date   • Anxiety and depression    • Chronic back pain     last steroid injection    • Dyspnea on exertion    • Endometriosis    • Fatigue    • Fatty liver     follows w/ GI ([place]), Mom w/ hx NUNES Cirrhosis, denies prior liver bx   • Hyperlipidemia    • Hypertension    • Melanoma in situ (CMS/HCC)     (R) forearm, resected, no subsequet tx, follows w/ derm   • MTHFR mutation (CMS/HCC)     hx miscarriage x 2, no hx DVT/PE, takes ASA81 mg qod   • Obesity    • PCOS (polycystic ovarian syndrome)     on Spironolactone   • Psoriatic arthritis (CMS/HCC)     follows w/ Rheumatology ([place]), on Enbrel/Gabapentin + prn Ibupfroen  "    Past Surgical History:   Procedure Laterality Date   • D&C HYSTEROSCOPY      x 5   • ENDOSCOPY N/A 2/4/2020    Procedure: ESOPHAGOGASTRODUODENOSCOPY;  Surgeon: Shayy Ojeda MD;  Location:  [facility];  Service: Bariatric;  Laterality: N/A;   • GASTRIC SLEEVE LAPAROSCOPIC N/A 2/4/2020    Procedure: GASTRIC SLEEVE LAPAROSCOPIC;  Surgeon: Shayy Ojeda MD;  Location:  [facility];  Service: Bariatric;  Laterality: N/A;   • SALPINGECTOMY Left 2013    ectopic pregnancy   • SKIN SURGERY      wide local excision right forearm melanoma in situ, also another for dysplastic nevus.   • TARSAL TUNNEL RELEASE Right 2015    (R) foot   • TONSILLECTOMY  1990     Outpatient Medications Marked as Taking for the 5/14/20 encounter (Telemedicine) with Shelly Peraza PA-C   Medication Sig Dispense Refill   • amLODIPine (NORVASC) 5 MG tablet Take 5 mg by mouth Daily.  0   • gabapentin (NEURONTIN) 300 MG capsule Take 300 mg by mouth 3 (Three) Times a Day.  0   • sertraline (ZOLOFT) 100 MG tablet Take 100 mg by mouth Daily.  2   • simvastatin (ZOCOR) 20 MG tablet Take 20 mg by mouth every night at bedtime.  1   • spironolactone (ALDACTONE) 50 MG tablet Take 150 mg by mouth 2 (Two) Times a Day.         Allergies   Allergen Reactions   • Adhesive Tape Itching and Rash       Social History     Socioeconomic History   • Marital status:      Spouse name: Not on file   • Number of children: Not on file   • Years of education: Not on file   • Highest education level: Not on file   Tobacco Use   • Smoking status: Never Smoker   • Smokeless tobacco: Never Used   Substance and Sexual Activity   • Alcohol use: No     Frequency: Never   • Drug use: No   • Sexual activity: Defer   Social History Narrative    Living in [place], [place] w/  and 4 children.  [facility] Outpatient Clinic - [facility].  She is an occupational therapist and owner.         Ht 171.5 cm (67.5\")   Wt 113 kg (249 lb)   BMI 38.42 kg/m² "     Physical Exam   Constitutional: She is oriented to person, place, and time. She appears well-nourished.   HENT:   Head: Atraumatic.   Pulmonary/Chest: Effort normal.   Neurological: She is alert and oriented to person, place, and time.   Psychiatric: She has a normal mood and affect. Her behavior is normal.         Assessment:  3 months s/p 2/4/20 LSG by Dr. Ojeda    ICD-10-CM ICD-9-CM   1. Status post bariatric surgery Z98.84 V45.86   2. Obesity, Class II, BMI 35-39.9 E66.9 278.00         Plan:  Doing well. Continue w/ good food choices and healthy habits. Increase calories toward 1200.  Continue protein 70-100g/day.  Continue fluids 64oz daily. Continue routine exercise.  Routine bariatric labs deferred.  Continue vitamins w/ adjustments pending lab results next office visit.  Call w/ problems/concerns.     The patient was instructed to follow up in 3 months, sooner if needed.      Total time spent w/ patient 25 minutes and 15 minutes spent counseling the patient on nutrition and necessary dietary/lifestyle modifications.

## 2020-08-10 ENCOUNTER — OFFICE (OUTPATIENT)
Dept: URBAN - METROPOLITAN AREA CLINIC 66 | Facility: CLINIC | Age: 40
End: 2020-08-10
Payer: COMMERCIAL

## 2020-08-10 VITALS
HEART RATE: 55 BPM | WEIGHT: 205 LBS | TEMPERATURE: 97.5 F | DIASTOLIC BLOOD PRESSURE: 67 MMHG | SYSTOLIC BLOOD PRESSURE: 109 MMHG | HEIGHT: 67 IN

## 2020-08-10 DIAGNOSIS — K76.0 FATTY (CHANGE OF) LIVER, NOT ELSEWHERE CLASSIFIED: ICD-10-CM

## 2020-08-10 PROCEDURE — 99213 OFFICE O/P EST LOW 20 MIN: CPT | Performed by: NURSE PRACTITIONER

## 2020-09-02 ENCOUNTER — OFFICE VISIT (OUTPATIENT)
Dept: BARIATRICS/WEIGHT MGMT | Facility: CLINIC | Age: 40
End: 2020-09-02

## 2020-09-02 VITALS
SYSTOLIC BLOOD PRESSURE: 116 MMHG | TEMPERATURE: 97.7 F | HEIGHT: 68 IN | HEART RATE: 68 BPM | BODY MASS INDEX: 29.63 KG/M2 | RESPIRATION RATE: 18 BRPM | OXYGEN SATURATION: 99 % | DIASTOLIC BLOOD PRESSURE: 66 MMHG | WEIGHT: 195.5 LBS

## 2020-09-02 DIAGNOSIS — R53.83 FATIGUE, UNSPECIFIED TYPE: ICD-10-CM

## 2020-09-02 DIAGNOSIS — Z13.21 MALNUTRITION SCREEN: ICD-10-CM

## 2020-09-02 DIAGNOSIS — K91.2 POSTGASTRECTOMY MALABSORPTION: ICD-10-CM

## 2020-09-02 DIAGNOSIS — Z13.0 SCREENING, IRON DEFICIENCY ANEMIA: ICD-10-CM

## 2020-09-02 DIAGNOSIS — Z90.3 POSTGASTRECTOMY MALABSORPTION: ICD-10-CM

## 2020-09-02 DIAGNOSIS — E66.9 OBESITY, CLASS I, BMI 30-34.9: ICD-10-CM

## 2020-09-02 DIAGNOSIS — E55.9 HYPOVITAMINOSIS D: ICD-10-CM

## 2020-09-02 DIAGNOSIS — Z98.84 STATUS POST BARIATRIC SURGERY: Primary | ICD-10-CM

## 2020-09-02 PROCEDURE — 99214 OFFICE O/P EST MOD 30 MIN: CPT | Performed by: PHYSICIAN ASSISTANT

## 2020-09-02 RX ORDER — HYDROXYZINE PAMOATE 25 MG/1
25 CAPSULE ORAL DAILY
COMMUNITY
Start: 2020-08-05 | End: 2021-10-21

## 2020-09-02 RX ORDER — DICLOFENAC SODIUM 75 MG/1
75 TABLET, DELAYED RELEASE ORAL DAILY
COMMUNITY
Start: 2020-08-05

## 2020-09-02 NOTE — PROGRESS NOTES
[facility] Bariatric Surgery  [address]    [facility] [id number] [phone]        Patient Name:  Mariel Fox.  :  1980        Reason for Visit:   7 months postop      HPI: Mariel Fox is a 40 y.o. female  s/p 20 LSG by Dr. Ojeda     Doing well. Feels her weightloss has been slower than it should. Vomits if overeating or eating too fast, which is rarely. But noticing her weightloss. Has a goal of 150-160lb.  No other issues/concerns. Denies dysphagia, reflux, abdominal pain, pulmonary issues and fevers.  Getting 3 meals a day + 2 snacks. occ skip breakfast. 80-100g prot/day.1200ish calories.   Drinking 64+ fluid oz/day.  3 month labs revealed elevated ferritin.  Taking MVI.off iron for a month.   Not on antacid.   Exercising- walk/ jog regularly.     Presurgery weight: 249 pounds.  Today's weight is 88.7 kg (195 lb 8 oz) pounds, today's  Body mass index is 30.17 kg/m²., and@ weight loss since surgery is 54 pounds.      Past Medical History:   Diagnosis Date   • Anxiety and depression    • Chronic back pain     last steroid injection    • Dyspnea on exertion    • Endometriosis    • Fatigue    • Fatty liver     follows w/ GI ([place]), Mom w/ hx NUNES Cirrhosis, denies prior liver bx   • Hyperlipidemia    • Hypertension    • Melanoma in situ (CMS/HCC)     (R) forearm, resected, no subsequet tx, follows w/ derm   • MTHFR mutation (CMS/HCC)     hx miscarriage x 2, no hx DVT/PE, takes ASA81 mg qod   • Obesity    • PCOS (polycystic ovarian syndrome)     on Spironolactone   • Psoriatic arthritis (CMS/HCC)     follows w/ Rheumatology ([place]), on Enbrel/Gabapentin + prn Ibupfroen     Past Surgical History:   Procedure Laterality Date   • D&C HYSTEROSCOPY      x 5   • ENDOSCOPY N/A 2020    Procedure: ESOPHAGOGASTRODUODENOSCOPY;  Surgeon: Shayy Ojeda MD;  Location: [facility];  Service: Bariatric;  Laterality: N/A;   • GASTRIC SLEEVE  LAPAROSCOPIC N/A 2/4/2020    Procedure: GASTRIC SLEEVE LAPAROSCOPIC;  Surgeon: Shayy Ojeda MD;  Location: [facility];  Service: Bariatric;  Laterality: N/A;   • SALPINGECTOMY Left 2013    ectopic pregnancy   • SKIN SURGERY      wide local excision right forearm melanoma in situ, also another for dysplastic nevus.   • TARSAL TUNNEL RELEASE Right 2015    (R) foot   • TONSILLECTOMY  1990     Outpatient Medications Marked as Taking for the 9/2/20 encounter (Office Visit) with Shelly Peraza PA-C   Medication Sig Dispense Refill   • amLODIPine (NORVASC) 5 MG tablet Take 5 mg by mouth Daily.  0   • diclofenac (VOLTAREN) 75 MG EC tablet TAKE ONE TABLET BY MOUTH TWICE DAILY     • Etanercept (Enbrel) 25 MG/0.5ML injection      • gabapentin (NEURONTIN) 300 MG capsule Take 300 mg by mouth 3 (Three) Times a Day.  0   • hydrOXYzine pamoate (VISTARIL) 25 MG capsule TAKE ONE CAPSULE BY MOUTH EVERY 6 TO 8 HOURS AS NEEDED FOR ITCHING     • sertraline (ZOLOFT) 100 MG tablet Take 100 mg by mouth Daily.  2   • simvastatin (ZOCOR) 20 MG tablet Take 20 mg by mouth every night at bedtime.  1   • spironolactone (ALDACTONE) 50 MG tablet Take 150 mg by mouth 2 (Two) Times a Day.         Allergies   Allergen Reactions   • Adhesive Tape Itching and Rash       Social History     Socioeconomic History   • Marital status:      Spouse name: Not on file   • Number of children: Not on file   • Years of education: Not on file   • Highest education level: Not on file   Tobacco Use   • Smoking status: Never Smoker   • Smokeless tobacco: Never Used   Substance and Sexual Activity   • Alcohol use: No     Frequency: Never   • Drug use: No   • Sexual activity: Defer   Social History Narrative    Living in [place], [place] w/  and 4 children.  [facility] Outpatient Clinic - [facility].  She is an occupational therapist and owner.         /66 (BP Location: Left arm, Patient Position: Sitting, Cuff Size: Large Adult)  "  Pulse 68   Temp 97.7 °F (36.5 °C) (Temporal)   Resp 18   Ht 171.5 cm (67.5\")   Wt 88.7 kg (195 lb 8 oz)   SpO2 99%   BMI 30.17 kg/m²     Physical Exam   Constitutional: She is oriented to person, place, and time. She appears well-developed and well-nourished.   HENT:   Head: Normocephalic and atraumatic.   Cardiovascular: Normal rate and regular rhythm.   Pulmonary/Chest: Effort normal and breath sounds normal.   Abdominal: Soft. Bowel sounds are normal.   Incisions healing well   Neurological: She is alert and oriented to person, place, and time.   Skin: Skin is warm and dry.   Psychiatric: She has a normal mood and affect. Her behavior is normal. Judgment and thought content normal.         Assessment:  7 months s/p 2/4/20 LSG by Dr. Ojeda     ICD-10-CM ICD-9-CM   1. Status post bariatric surgery Z98.84 V45.86   2. Obesity, Class I, BMI 30-34.9 E66.9 278.00   3. Hypovitaminosis D E55.9 268.9   4. Screening, iron deficiency anemia Z13.0 V78.0   5. Malnutrition screen Z13.21 V77.2   6. Postgastrectomy malabsorption K91.2 579.3    Z90.3    7. Fatigue, unspecified type R53.83 780.79         Plan:  Doing well. Continue w/ good food choices and healthy habits.  Continue to focus on high protein, low carb.  Keep tracking intake.  Continue routine exercise.  Routine bariatric labs ordered.  Continue vitamins w/ adjustments pending lab results.  Call w/ problems/concerns.     Patient's Body mass index is 30.17 kg/m². BMI is above normal parameters. Recommendations include: exercise counseling and nutrition counseling.        The patient was instructed to follow up in 3 months, sooner if needed.      Total time spent w/ patient 25 minutes and 15 minutes spent counseling the patient on nutrition and necessary dietary/lifestyle modifications.    "

## 2020-09-05 LAB
25(OH)D3+25(OH)D2 SERPL-MCNC: 54.2 NG/ML (ref 30–100)
ALBUMIN SERPL-MCNC: 5 G/DL (ref 3.5–5.2)
ALBUMIN/GLOB SERPL: 2.2 G/DL
ALP SERPL-CCNC: 101 U/L (ref 39–117)
ALT SERPL-CCNC: 23 U/L (ref 1–33)
AST SERPL-CCNC: 24 U/L (ref 1–32)
BASOPHILS # BLD AUTO: 0.05 10*3/MM3 (ref 0–0.2)
BASOPHILS NFR BLD AUTO: 0.8 % (ref 0–1.5)
BILIRUB SERPL-MCNC: 1.2 MG/DL (ref 0–1.2)
BUN SERPL-MCNC: 17 MG/DL (ref 6–20)
BUN/CREAT SERPL: 20.2 (ref 7–25)
CALCIUM SERPL-MCNC: 9.8 MG/DL (ref 8.6–10.5)
CHLORIDE SERPL-SCNC: 98 MMOL/L (ref 98–107)
CO2 SERPL-SCNC: 26.4 MMOL/L (ref 22–29)
CREAT SERPL-MCNC: 0.84 MG/DL (ref 0.57–1)
EOSINOPHIL # BLD AUTO: 0.09 10*3/MM3 (ref 0–0.4)
EOSINOPHIL NFR BLD AUTO: 1.4 % (ref 0.3–6.2)
ERYTHROCYTE [DISTWIDTH] IN BLOOD BY AUTOMATED COUNT: 12.6 % (ref 12.3–15.4)
FERRITIN SERPL-MCNC: 172 NG/ML (ref 13–150)
FOLATE SERPL-MCNC: 14.9 NG/ML (ref 4.78–24.2)
GLOBULIN SER CALC-MCNC: 2.3 GM/DL
GLUCOSE SERPL-MCNC: 81 MG/DL (ref 65–99)
HCT VFR BLD AUTO: 43.9 % (ref 34–46.6)
HGB BLD-MCNC: 14.3 G/DL (ref 12–15.9)
IMM GRANULOCYTES # BLD AUTO: 0.01 10*3/MM3 (ref 0–0.05)
IMM GRANULOCYTES NFR BLD AUTO: 0.2 % (ref 0–0.5)
IRON SERPL-MCNC: 123 MCG/DL (ref 37–145)
LYMPHOCYTES # BLD AUTO: 2.58 10*3/MM3 (ref 0.7–3.1)
LYMPHOCYTES NFR BLD AUTO: 41 % (ref 19.6–45.3)
Lab: NORMAL
MCH RBC QN AUTO: 30.3 PG (ref 26.6–33)
MCHC RBC AUTO-ENTMCNC: 32.6 G/DL (ref 31.5–35.7)
MCV RBC AUTO: 93 FL (ref 79–97)
METHYLMALONATE SERPL-SCNC: 160 NMOL/L (ref 0–378)
MONOCYTES # BLD AUTO: 0.52 10*3/MM3 (ref 0.1–0.9)
MONOCYTES NFR BLD AUTO: 8.3 % (ref 5–12)
NEUTROPHILS # BLD AUTO: 3.05 10*3/MM3 (ref 1.7–7)
NEUTROPHILS NFR BLD AUTO: 48.3 % (ref 42.7–76)
NRBC BLD AUTO-RTO: 0 /100 WBC (ref 0–0.2)
PLATELET # BLD AUTO: 329 10*3/MM3 (ref 140–450)
POTASSIUM SERPL-SCNC: 4.7 MMOL/L (ref 3.5–5.2)
PREALB SERPL-MCNC: 33 MG/DL (ref 14–35)
PROT SERPL-MCNC: 7.3 G/DL (ref 6–8.5)
RBC # BLD AUTO: 4.72 10*6/MM3 (ref 3.77–5.28)
SODIUM SERPL-SCNC: 135 MMOL/L (ref 136–145)
VIT B1 BLD-SCNC: 132.6 NMOL/L (ref 66.5–200)
WBC # BLD AUTO: 6.3 10*3/MM3 (ref 3.4–10.8)

## 2020-12-17 ENCOUNTER — OFFICE VISIT (OUTPATIENT)
Dept: BARIATRICS/WEIGHT MGMT | Facility: CLINIC | Age: 40
End: 2020-12-17

## 2020-12-17 VITALS
DIASTOLIC BLOOD PRESSURE: 64 MMHG | BODY MASS INDEX: 28.57 KG/M2 | HEART RATE: 76 BPM | TEMPERATURE: 97.8 F | HEIGHT: 68 IN | OXYGEN SATURATION: 98 % | RESPIRATION RATE: 18 BRPM | SYSTOLIC BLOOD PRESSURE: 118 MMHG | WEIGHT: 188.5 LBS

## 2020-12-17 DIAGNOSIS — Z13.21 MALNUTRITION SCREEN: ICD-10-CM

## 2020-12-17 DIAGNOSIS — Z90.3 POSTGASTRECTOMY MALABSORPTION: ICD-10-CM

## 2020-12-17 DIAGNOSIS — E55.9 HYPOVITAMINOSIS D: ICD-10-CM

## 2020-12-17 DIAGNOSIS — R53.83 FATIGUE, UNSPECIFIED TYPE: Primary | ICD-10-CM

## 2020-12-17 DIAGNOSIS — K91.2 POSTGASTRECTOMY MALABSORPTION: ICD-10-CM

## 2020-12-17 DIAGNOSIS — Z13.0 SCREENING, IRON DEFICIENCY ANEMIA: ICD-10-CM

## 2020-12-17 PROCEDURE — 99213 OFFICE O/P EST LOW 20 MIN: CPT | Performed by: SURGERY

## 2020-12-17 RX ORDER — GABAPENTIN 300 MG/1
300 CAPSULE ORAL DAILY
COMMUNITY
Start: 2020-09-12 | End: 2021-03-10

## 2020-12-17 RX ORDER — MEDROXYPROGESTERONE ACETATE 150 MG/ML
INJECTION, SUSPENSION INTRAMUSCULAR
COMMUNITY
Start: 2020-10-07 | End: 2021-03-10

## 2020-12-17 RX ORDER — FLIBANSERIN 100 MG/1
100 TABLET, FILM COATED ORAL DAILY
COMMUNITY
Start: 2020-11-30 | End: 2022-11-07

## 2020-12-17 RX ORDER — SPIRONOLACTONE 100 MG/1
100 TABLET, FILM COATED ORAL DAILY
COMMUNITY
Start: 2020-09-17 | End: 2023-01-19 | Stop reason: ALTCHOICE

## 2020-12-17 RX ORDER — CIMETIDINE 400 MG/1
400 TABLET, FILM COATED ORAL 2 TIMES DAILY
COMMUNITY
Start: 2020-11-14 | End: 2021-10-21

## 2020-12-17 RX ORDER — LORATADINE 10 MG/1
10 TABLET ORAL DAILY
COMMUNITY
Start: 2020-11-14 | End: 2021-03-10

## 2020-12-17 NOTE — PROGRESS NOTES
[facility] Bariatric Surgery  [address]    [facility] [id number] [phone]        Patient Name:  Mariel Fox.  :  1980      Date of Visit: 2020      Reason for Visit:   9 months postop     HPI: Mariel Fox is a 40 y.o. female s/p 20 LSG by Dr. Ojeda       Doing well.  No issues/concerns. Denies dysphagia, reflux, nausea, vomiting and abdominal pain.  Getting 60-80 g prot/day.  Drinking 64 fluid oz/day.  Last labs revealed  no vitamin deficiencies. Taking MVI. Exercise: was joggin 5Ks last summer, but is temporarily waylaid due to foot injury.  She started walking instead of jogging.  She still works with a  3x per week.  She is having foot surgery in January and will be non-weight bearing x 6 weeks.     Presurgery weight: 249 pounds.  Today's weight is 85.5 kg (188 lb 8 oz) pounds, today's  Body mass index is 29.09 kg/m²., and weight loss since surgery is 61 pounds.      Past Medical History:   Diagnosis Date   • Anxiety and depression    • Chronic back pain     last steroid injection    • Dyspnea on exertion    • Endometriosis    • Fatigue    • Fatty liver     follows w/ GI ([place]), Mom w/ hx NUNES Cirrhosis, denies prior liver bx   • Hyperlipidemia    • Hypertension    • Melanoma in situ (CMS/HCC)     (R) forearm, resected, no subsequet tx, follows w/ derm   • MTHFR mutation (CMS/HCC)     hx miscarriage x 2, no hx DVT/PE, takes ASA81 mg qod   • Obesity    • PCOS (polycystic ovarian syndrome)     on Spironolactone   • Psoriatic arthritis (CMS/HCC)     follows w/ Rheumatology ([place]), on Enbrel/Gabapentin + prn Ibupfroen     Past Surgical History:   Procedure Laterality Date   • D&C HYSTEROSCOPY      x 5   • ENDOSCOPY N/A 2020    Procedure: ESOPHAGOGASTRODUODENOSCOPY;  Surgeon: Shayy Ojeda MD;  Location: [facility];  Service: Bariatric;  Laterality: N/A;   • GASTRIC SLEEVE LAPAROSCOPIC N/A 2020     Procedure: GASTRIC SLEEVE LAPAROSCOPIC;  Surgeon: Shayy Ojeda MD;  Location: [facility];  Service: Bariatric;  Laterality: N/A;   • SALPINGECTOMY Left 2013    ectopic pregnancy   • SKIN SURGERY      wide local excision right forearm melanoma in situ, also another for dysplastic nevus.   • TARSAL TUNNEL RELEASE Right 2015    (R) foot   • TONSILLECTOMY  1990     Outpatient Medications Marked as Taking for the 12/17/20 encounter (Office Visit) with Shayy Ojeda MD   Medication Sig Dispense Refill   • amLODIPine (NORVASC) 5 MG tablet Take 5 mg by mouth Daily.  0   • cimetidine (TAGAMET) 400 MG tablet Take 400 mg by mouth 2 (Two) Times a Day.     • diclofenac (VOLTAREN) 75 MG EC tablet TAKE ONE TABLET BY MOUTH TWICE DAILY     • Enbrel SureClick 50 MG/ML solution auto-injector      • Etanercept (Enbrel) 25 MG/0.5ML injection      • Flibanserin (Addyi) 100 MG tablet Take 100 mg by mouth Daily.     • gabapentin (NEURONTIN) 300 MG capsule Take 300 mg by mouth 3 (Three) Times a Day.  0   • gabapentin (NEURONTIN) 300 MG capsule Take 300 mg by mouth Daily.     • hydrOXYzine HCl (ATARAX PO)      • hydrOXYzine pamoate (VISTARIL) 25 MG capsule TAKE ONE CAPSULE BY MOUTH EVERY 6 TO 8 HOURS AS NEEDED FOR ITCHING     • loratadine (CLARITIN) 10 MG tablet Take 10 mg by mouth Daily.     • sertraline (ZOLOFT) 100 MG tablet Take 100 mg by mouth Daily.  2   • simvastatin (ZOCOR) 20 MG tablet Take 20 mg by mouth every night at bedtime.  1   • spironolactone (ALDACTONE) 100 MG tablet Take 100 mg by mouth 2 (Two) Times a Day.     • spironolactone (ALDACTONE) 50 MG tablet Take 150 mg by mouth 2 (Two) Times a Day.         Allergies   Allergen Reactions   • Adhesive Tape Itching and Rash       Social History     Socioeconomic History   • Marital status:      Spouse name: Not on file   • Number of children: Not on file   • Years of education: Not on file   • Highest education level: Not on file   Tobacco Use   • Smoking  "status: Never Smoker   • Smokeless tobacco: Never Used   Substance and Sexual Activity   • Alcohol use: No     Frequency: Never   • Drug use: No   • Sexual activity: Defer   Social History Narrative    Living in [place], [place] w/  and 4 children.  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         /64 (BP Location: Left arm, Patient Position: Sitting, Cuff Size: Adult)   Pulse 76   Temp 97.8 °F (36.6 °C) (Temporal)   Resp 18   Ht 171.5 cm (67.5\")   Wt 85.5 kg (188 lb 8 oz)   SpO2 98%   BMI 29.09 kg/m²     Physical Exam  Constitutional:       General: She is not in acute distress.     Appearance: She is well-developed. She is not diaphoretic.   HENT:      Head: Normocephalic and atraumatic.      Mouth/Throat:      Pharynx: No oropharyngeal exudate.   Eyes:      Conjunctiva/sclera: Conjunctivae normal.      Pupils: Pupils are equal, round, and reactive to light.   Pulmonary:      Effort: Pulmonary effort is normal. No respiratory distress.   Abdominal:      General: There is no distension.      Palpations: Abdomen is soft.   Skin:     General: Skin is warm and dry.      Coloration: Skin is not pale.   Neurological:      Mental Status: She is alert and oriented to person, place, and time.      Cranial Nerves: No cranial nerve deficit.   Psychiatric:         Behavior: Behavior normal.         Thought Content: Thought content normal.           Assessment:  9 months s/p 2/4/20 LSG by Dr. Ojeda       ICD-10-CM ICD-9-CM   1. Fatigue, unspecified type  R53.83 780.79   2. Hypovitaminosis D  E55.9 268.9   3. Malnutrition screen  Z13.21 V77.2   4. Screening, iron deficiency anemia  Z13.0 V78.0   5. Postgastrectomy malabsorption  K91.2 579.3    Z90.3          Plan:  Doing well. Continue w/ good food choices and healthy habits.  Continue protein >70g/day.  Continue routine exercise.  Routine bariatric labs ordered.  Continue vitamins w/ adjustments " pending lab results.  Call w/ problems/concerns.     The patient was instructed to follow up in 3 months, sooner if needed.    note: approx 15 of the 25 minute visit was spent counseling on nutrition and necessary dietary/lifestyle modifications face to face.    Shayy Ojeda MD

## 2020-12-24 LAB
25(OH)D3+25(OH)D2 SERPL-MCNC: 37.4 NG/ML (ref 30–100)
ALBUMIN SERPL-MCNC: 4.6 G/DL (ref 3.8–4.8)
ALBUMIN/GLOB SERPL: 1.8 {RATIO} (ref 1.2–2.2)
ALP SERPL-CCNC: 82 IU/L (ref 39–117)
ALT SERPL-CCNC: 20 IU/L (ref 0–32)
AST SERPL-CCNC: 19 IU/L (ref 0–40)
BASOPHILS # BLD AUTO: 0.1 X10E3/UL (ref 0–0.2)
BASOPHILS NFR BLD AUTO: 1 %
BILIRUB SERPL-MCNC: 0.7 MG/DL (ref 0–1.2)
BUN SERPL-MCNC: 17 MG/DL (ref 6–20)
BUN/CREAT SERPL: 16 (ref 9–23)
CALCIUM SERPL-MCNC: 9.8 MG/DL (ref 8.7–10.2)
CHLORIDE SERPL-SCNC: 101 MMOL/L (ref 96–106)
CO2 SERPL-SCNC: 22 MMOL/L (ref 20–29)
CREAT SERPL-MCNC: 1.08 MG/DL (ref 0.57–1)
EOSINOPHIL # BLD AUTO: 0.1 X10E3/UL (ref 0–0.4)
EOSINOPHIL NFR BLD AUTO: 1 %
ERYTHROCYTE [DISTWIDTH] IN BLOOD BY AUTOMATED COUNT: 12.1 % (ref 11.7–15.4)
FERRITIN SERPL-MCNC: 114 NG/ML (ref 15–150)
FOLATE SERPL-MCNC: 12.8 NG/ML
GLOBULIN SER CALC-MCNC: 2.6 G/DL (ref 1.5–4.5)
GLUCOSE SERPL-MCNC: 77 MG/DL (ref 65–99)
HCT VFR BLD AUTO: 40.1 % (ref 34–46.6)
HGB BLD-MCNC: 13.6 G/DL (ref 11.1–15.9)
IMM GRANULOCYTES # BLD AUTO: 0 X10E3/UL (ref 0–0.1)
IMM GRANULOCYTES NFR BLD AUTO: 0 %
IRON SERPL-MCNC: 57 UG/DL (ref 27–159)
LYMPHOCYTES # BLD AUTO: 2.9 X10E3/UL (ref 0.7–3.1)
LYMPHOCYTES NFR BLD AUTO: 31 %
Lab: NORMAL
MCH RBC QN AUTO: 31.1 PG (ref 26.6–33)
MCHC RBC AUTO-ENTMCNC: 33.9 G/DL (ref 31.5–35.7)
MCV RBC AUTO: 92 FL (ref 79–97)
METHYLMALONATE SERPL-SCNC: 107 NMOL/L (ref 0–378)
MONOCYTES # BLD AUTO: 0.6 X10E3/UL (ref 0.1–0.9)
MONOCYTES NFR BLD AUTO: 7 %
NEUTROPHILS # BLD AUTO: 5.6 X10E3/UL (ref 1.4–7)
NEUTROPHILS NFR BLD AUTO: 60 %
PLATELET # BLD AUTO: 343 X10E3/UL (ref 150–450)
POTASSIUM SERPL-SCNC: 4.7 MMOL/L (ref 3.5–5.2)
PREALB SERPL-MCNC: 27 MG/DL (ref 14–35)
PROT SERPL-MCNC: 7.2 G/DL (ref 6–8.5)
RBC # BLD AUTO: 4.37 X10E6/UL (ref 3.77–5.28)
SODIUM SERPL-SCNC: 138 MMOL/L (ref 134–144)
VIT B1 BLD-SCNC: 133.6 NMOL/L (ref 66.5–200)
WBC # BLD AUTO: 9.4 X10E3/UL (ref 3.4–10.8)

## 2021-03-10 ENCOUNTER — OFFICE VISIT (OUTPATIENT)
Dept: BARIATRICS/WEIGHT MGMT | Facility: CLINIC | Age: 41
End: 2021-03-10

## 2021-03-10 VITALS
DIASTOLIC BLOOD PRESSURE: 64 MMHG | HEIGHT: 68 IN | TEMPERATURE: 97.6 F | SYSTOLIC BLOOD PRESSURE: 112 MMHG | BODY MASS INDEX: 28.11 KG/M2 | RESPIRATION RATE: 18 BRPM | HEART RATE: 69 BPM | OXYGEN SATURATION: 99 % | WEIGHT: 185.5 LBS

## 2021-03-10 DIAGNOSIS — R10.13 DYSPEPSIA: ICD-10-CM

## 2021-03-10 DIAGNOSIS — Z98.84 S/P BARIATRIC SURGERY: Primary | ICD-10-CM

## 2021-03-10 DIAGNOSIS — E66.3 OVERWEIGHT (BMI 25.0-29.9): ICD-10-CM

## 2021-03-10 PROCEDURE — 99214 OFFICE O/P EST MOD 30 MIN: CPT | Performed by: PHYSICIAN ASSISTANT

## 2021-03-10 RX ORDER — FLUOCINONIDE TOPICAL SOLUTION USP, 0.05% 0.5 MG/ML
SOLUTION TOPICAL DAILY PRN
COMMUNITY
Start: 2021-03-02 | End: 2022-11-07

## 2021-03-10 NOTE — PROGRESS NOTES
"[facility]  [address]    [facility] [id number] [phone]        Patient Name:  Mariel Fox  :  1980      Date of Visit: 03/10/2021      Reason for Visit:   Annual Eval - 1 year postop      HPI: Mariel Fox is a 40 y.o. female s/p LSG 20 w/ Dr. Ojeda    Recent issues w/ constant nausea x 1 wk, followed by gas/bloating issues, then started her menstrual cycle 12 days early.  Does have hx endometriosis.  No further issues since cycle ended.  Suspects all likely hormonal, but wanted to make mention, has never had these issues before - does still have gallbladder.      Otherwise doing fine.  Weight loss is \"slow.\"  Personal goal 165 lbs.     Feels she makes good food choices.  Gets 80g prot/day (on average).  Exercises 4x/week.     Labs 20 - Cr 1.08, o/w WNL.  Taking daily MVI.       Presurgery weight: 249 pounds.  Today's weight is 84.1 kg (185 lb 8 oz) pounds and weight loss since surgery is 64 pounds.      Past Medical History:   Diagnosis Date   • Anxiety and depression    • Chronic back pain     last steroid injection    • Dyspnea on exertion    • Endometriosis    • Fatigue    • Fatty liver     follows w/ GI ([place]), Mom w/ hx NUNES Cirrhosis, denies prior liver bx   • Hyperlipidemia    • Hypertension    • Melanoma in situ (CMS/HCC)     (R) forearm, resected, no subsequet tx, follows w/ derm   • MTHFR mutation (CMS/HCC)     hx miscarriage x 2, no hx DVT/PE, takes ASA81 mg qod   • Obesity    • PCOS (polycystic ovarian syndrome)     on Spironolactone   • Psoriatic arthritis (CMS/HCC)     follows w/ Rheumatology ([place]), on Enbrel/Gabapentin + prn Ibupfroen     Past Surgical History:   Procedure Laterality Date   • D&C HYSTEROSCOPY      x 5   • ENDOSCOPY N/A 2020    Procedure: ESOPHAGOGASTRODUODENOSCOPY;  Surgeon: Shayy Ojeda MD;  Location: [facility];  Service: Bariatric;  Laterality: N/A;   • GASTRIC " SLEEVE LAPAROSCOPIC N/A 2/4/2020    Procedure: GASTRIC SLEEVE LAPAROSCOPIC;  Surgeon: Shayy Ojeda MD;  Location: [facility];  Service: Bariatric;  Laterality: N/A;   • SALPINGECTOMY Left 2013    ectopic pregnancy   • SKIN SURGERY      wide local excision right forearm melanoma in situ, also another for dysplastic nevus.   • TARSAL TUNNEL RELEASE Right 2015    (R) foot   • TONSILLECTOMY  1990     Outpatient Medications Marked as Taking for the 3/10/21 encounter (Office Visit) with Ailyn Mobley PA   Medication Sig Dispense Refill   • cimetidine (TAGAMET) 400 MG tablet Take 400 mg by mouth 2 (Two) Times a Day.     • diclofenac (VOLTAREN) 75 MG EC tablet TAKE ONE TABLET BY MOUTH TWICE DAILY     • etanercept (Enbrel) 50 MG/ML solution prefilled syringe injection 50 mg 1 (One) Time Per Week.     • Flibanserin (Addyi) 100 MG tablet Take 100 mg by mouth Daily.     • fluocinonide (LIDEX) 0.05 % external solution Apply  topically to the appropriate area as directed Daily As Needed.     • gabapentin (NEURONTIN) 300 MG capsule Take 300 mg by mouth Daily.  0   • hydrOXYzine pamoate (VISTARIL) 25 MG capsule Take 25 mg by mouth Daily.     • sertraline (ZOLOFT) 50 MG tablet Take 50 mg by mouth Daily.  2   • simvastatin (ZOCOR) 20 MG tablet Take 20 mg by mouth 2 (Two) Times a Week.  1   • spironolactone (ALDACTONE) 100 MG tablet Take 100 mg by mouth Daily.     • [DISCONTINUED] amLODIPine (NORVASC) 5 MG tablet Take 5 mg by mouth Daily.  0   • [DISCONTINUED] loratadine (CLARITIN) 10 MG tablet Take 10 mg by mouth Daily.         Allergies   Allergen Reactions   • Adhesive Tape Itching and Rash       Social History     Socioeconomic History   • Marital status:      Spouse name: Not on file   • Number of children: Not on file   • Years of education: Not on file   • Highest education level: Not on file   Tobacco Use   • Smoking status: Never Smoker   • Smokeless tobacco: Never Used   Substance and Sexual Activity  "  • Alcohol use: No   • Drug use: No   • Sexual activity: Defer       /64 (BP Location: Left arm, Patient Position: Sitting, Cuff Size: Large Adult)   Pulse 69   Temp 97.6 °F (36.4 °C) (Temporal)   Resp 18   Ht 171.5 cm (67.5\")   Wt 84.1 kg (185 lb 8 oz)   SpO2 99%   BMI 28.62 kg/m²     Physical Exam  Constitutional:       Appearance: She is well-developed.      Comments: wearing a mask   Eyes:      General: No scleral icterus.     Conjunctiva/sclera: Conjunctivae normal.   Cardiovascular:      Rate and Rhythm: Normal rate.   Pulmonary:      Effort: Pulmonary effort is normal.   Abdominal:      Palpations: Abdomen is soft.      Tenderness: There is no abdominal tenderness.   Musculoskeletal:         General: Normal range of motion.   Skin:     General: Skin is warm and dry.      Findings: No rash.   Neurological:      Mental Status: She is alert.   Psychiatric:         Behavior: Behavior is cooperative.         Judgment: Judgment normal.           Assessment:  1 year s/p LSG 2/4/20 w/ Dr. Ojeda    ICD-10-CM ICD-9-CM   1. S/P bariatric surgery  Z98.84 V45.86   2. Overweight (BMI 25.0-29.9)  E66.3 278.02   3. Dyspepsia  R10.13 536.8       Patient's Body mass index is 28.62 kg/m². BMI is above normal parameters. Recommendations include: exercise counseling and nutrition counseling.    Plan:  Continue w/ good food choices and healthy habits.  Will refer to [facility] for additional eval/management - feels needs assistance w/ metabolism to optimize weight loss.  No labs today.  Call w/ problems/concerns.     The patient was instructed to follow up in 6 months, sooner if needed.    I spent 30 minutes on this patient encounter, which includes chart review, evaluation & management, patient education and counseling r/t healthy habits and necessary dietary/lifestyle modifications for continued success/weight loss.         LAURA Mart      "

## 2021-04-21 ENCOUNTER — OFFICE VISIT (OUTPATIENT)
Dept: BARIATRICS/WEIGHT MGMT | Facility: CLINIC | Age: 41
End: 2021-04-21

## 2021-04-21 VITALS
OXYGEN SATURATION: 99 % | BODY MASS INDEX: 28.19 KG/M2 | TEMPERATURE: 97.3 F | SYSTOLIC BLOOD PRESSURE: 104 MMHG | DIASTOLIC BLOOD PRESSURE: 60 MMHG | HEART RATE: 67 BPM | HEIGHT: 68 IN | WEIGHT: 186 LBS | RESPIRATION RATE: 18 BRPM

## 2021-04-21 DIAGNOSIS — I10 ESSENTIAL HYPERTENSION: ICD-10-CM

## 2021-04-21 DIAGNOSIS — E66.3 OVERWEIGHT (BMI 25.0-29.9): Primary | ICD-10-CM

## 2021-04-21 DIAGNOSIS — E78.2 MIXED HYPERLIPIDEMIA: ICD-10-CM

## 2021-04-21 DIAGNOSIS — M54.9 CHRONIC BACK PAIN, UNSPECIFIED BACK LOCATION, UNSPECIFIED BACK PAIN LATERALITY: ICD-10-CM

## 2021-04-21 DIAGNOSIS — E28.2 PCOS (POLYCYSTIC OVARIAN SYNDROME): ICD-10-CM

## 2021-04-21 DIAGNOSIS — R06.09 DYSPNEA ON EXERTION: ICD-10-CM

## 2021-04-21 DIAGNOSIS — Z51.81 ENCOUNTER FOR THERAPEUTIC DRUG LEVEL MONITORING: ICD-10-CM

## 2021-04-21 DIAGNOSIS — G89.29 CHRONIC BACK PAIN, UNSPECIFIED BACK LOCATION, UNSPECIFIED BACK PAIN LATERALITY: ICD-10-CM

## 2021-04-21 PROCEDURE — 99417 PROLNG OP E/M EACH 15 MIN: CPT | Performed by: NURSE PRACTITIONER

## 2021-04-21 PROCEDURE — 99215 OFFICE O/P EST HI 40 MIN: CPT | Performed by: NURSE PRACTITIONER

## 2021-04-21 NOTE — ASSESSMENT & PLAN NOTE
Lipid abnormalities are worsening.  Nutritional counseling was provided. and patient on statin with PCP  Lipids will be reassessed in 6 months.

## 2021-04-21 NOTE — PROGRESS NOTES
Chief Complaint  Follow-up, Obesity, and Nutrition Counseling    Subjective          Mariel Fox presents to [facility] for insufficient weight loss following LSG 2/4/2020 with Dr. Ojeda. Presurgery weight: 249 pounds. Current weight is 186 and weight loss since surgery is 63lb. Patient's goal is 165lb. Coexisting conditions include hyperlipidemia, PCOS, chronic back pain, dyspepsia, psoriatic arthritis, depression with anxiety, and fatigue. Had foot surgery in January, was non-weight bearing for 4 weeks, so exercise was low for about 6 weeks but otherwise has been working with a  three times a week with a friend until early April. States it was getting redundant. Continues to exercise regularly- walk or jog 3-4 days a week for 30-45 minutes. Not currently doing strength training but does enjoy it.   States she makes much better choices after surgery. Eats smaller portions but does find herself eating too much and purges occasionally. Has four children, rarely home at night for dinner due to sports. Previously used [employer] food journal kesha. Occasionally diet coke or diet mt dew. Usually drinks unsweet tea and water .   B: Bar or shake or coffee with 1/2 protein shake  L: restaurant  D: fast food   Sleeping 7-8 hours a night    Has taken saxenda in the past, worked well until it didn't. Took prior to surgery. No side effects. Having some cravings- thinks it is related to her frequent periods.     The patient is exercising with a FITT score of:2-3-2-2    Patient's Body mass index is 28.7 kg/m². BMI is above normal parameters. Recommendations include: educational material, exercise counseling and nutrition counseling.    Change in weight since last visit: +1lb    Recent Weight History:   Wt Readings from Last 3 Encounters:   04/21/21 84.4 kg (186 lb)   03/10/21 84.1 kg (185 lb 8 oz)   12/17/20 85.5 kg (188 lb 8 oz)     Start Weight: 186    Body composition analysis  "completed and showed:   %body fat: 40.8%    VS   Vitals:    04/21/21 1049   BP: 104/60   BP Location: Left arm   Patient Position: Sitting   Cuff Size: Adult   Pulse: 67   Resp: 18   Temp: 97.3 °F (36.3 °C)   SpO2: 99%   Weight: 84.4 kg (186 lb)   Height: 171.5 cm (67.5\")       Measurements (in inches)  Waist: 33\"    Objective   Vital Signs:   /60 (BP Location: Left arm, Patient Position: Sitting, Cuff Size: Adult)   Pulse 67   Temp 97.3 °F (36.3 °C)   Resp 18   Ht 171.5 cm (67.5\")   Wt 84.4 kg (186 lb)   SpO2 99%   BMI 28.70 kg/m²     Physical Exam   General appears stated age and normal appearance   HEENT PERRLA, EOM intact and conjunctivae normal   Chest/lungs Normal rate, Regular rhythm and Breathing is unlabored   Abdomen deferred   Extremities without edema   Neuro Good historian and No focal deficit   Skin Warm, dry, intact   Psych normal behavior, normal thought content and normal concentration     Result Review :   The following data was reviewed by: ROCCO Figueroa on 04/21/2021:  CMP, CBC, Vit D, lipids (2019)  Has more recent labs that I am requesting today.       Data reviewed: Cardiology studies EKG normal in 2019, pre-surgery, asymptomatic          Assessment and Plan    Diagnoses and all orders for this visit:    1. Overweight (BMI 25.0-29.9) (Primary)  Assessment & Plan:  Patient's (Body mass index is 28.7 kg/m².) indicates that they are overweight (BMI 25-29.9) with obesity-related health conditions that include dyslipidemias and GERD . Obesity is unchanged. BMI is is above average; BMI management plan is completed. We discussed portion control, increasing exercise and pharmacologic options including supplements.   Topics of discussion included obesity as a disease, nutritional education on food groups, exercise, and medications. Patient was instructed in adequate protein, controlled carb and controlled fat intake.   Patient received instructions on using the medicines as a tool " in controlling their weight with nutritional and behavioral changes. Risks and benefits were discussed. I believe the potential benefits of medication helping to decrease weight outweighs the risks. Patient is to try nutritonal/behavioral changes only first.   Patient received our clinic education booklet.   Our patient consent form was reviewed including potential risks of weight loss. We also reviewed our confidentiality and HIPPA statements. Patients current FITT score was reviewed along with current capability for exercise tolerance and a patient will work towards a FITT score of:     Frequency   Intensity Time Strength Training   []   0 None  []   0 None  []   0 None  []   0 None    []   1 (1-2x/week) []   1 (light) []   1 (<10 min) []   1 (1x/week)   [x]   2 (3-5x/week) [x]   2 (moderate) []   2 (10-20 min) [x]   2 (2x/week)   []   3 (daily)   []   3 (moderately hard)  []   4 (very hard) []   3 (20-30 min)  [x]   4 (>30 min) []   3 (3-4x/week)       Patient's past medical history was reviewed in detail and barriers to weight loss were identified and discussed. Past efforts at weight reduction on their own as well as under physician supervision were documented and discussed.  I advised patient to continue routine care with their Primary Care Provider.     Nutritional recommendations and goals were reviewed including Calories:7465-0908 daily adjusted for exercise calories burnt, Protein:  g daily, Net carbs (total carb - fiber) of 50-75g per day.  Start to keep a food journal and bring into next visit in 2 weeks for review. Practice the behavioral modification technique of mindful eating. Take one MVI daily and 2000mg fish oil daily. Take other medications and supplements as directed.    Orders:  -     multivitamin with minerals (Multivitamin Adults) tablet tablet; Take 1 tablet by mouth Daily.  Dispense: 30 tablet; Refill: 2  -     Omega-3 Fatty Acids (fish oil) 1000 MG capsule capsule; Take 1 capsule by  mouth 2 (Two) Times a Day With Meals.    2. Essential hypertension    3. Dyspnea on exertion    4. Mixed hyperlipidemia  Assessment & Plan:  Lipid abnormalities are worsening.  Nutritional counseling was provided. and patient on statin with PCP  Lipids will be reassessed in 6 months.    Orders:  -     Omega-3 Fatty Acids (fish oil) 1000 MG capsule capsule; Take 1 capsule by mouth 2 (Two) Times a Day With Meals.    5. Encounter for therapeutic drug level monitoring  -     Urine Drug Screen - Urine, Clean Catch    6. PCOS (polycystic ovarian syndrome)    7. Chronic back pain, unspecified back location, unspecified back pain laterality    Other orders  -     Cancel: Lipid Panel  -     Cancel: Insulin, Total; Future  -     Cancel: Hemoglobin A1c    I spent 70 minutes caring for Mariel on this date of service. This time includes time spent by me in the following activities:preparing for the visit, reviewing tests, performing a medically appropriate examination and/or evaluation , counseling and educating the patient/family/caregiver and documenting information in the medical record  Follow Up   Return in about 2 weeks (around 5/5/2021) for Next scheduled follow up.  Patient was given instructions and counseling regarding her condition or for health maintenance advice. Please see specific information pulled into the AVS if appropriate.

## 2021-04-22 PROBLEM — E66.3 OVERWEIGHT (BMI 25.0-29.9): Status: ACTIVE | Noted: 2021-04-22

## 2021-04-22 RX ORDER — MULTIPLE VITAMINS W/ MINERALS TAB 9MG-400MCG
1 TAB ORAL DAILY
Qty: 30 TABLET | Refills: 2
Start: 2021-04-22

## 2021-04-22 RX ORDER — CHLORAL HYDRATE 500 MG
1000 CAPSULE ORAL 2 TIMES DAILY WITH MEALS
Start: 2021-04-22 | End: 2023-01-11

## 2021-04-22 NOTE — ASSESSMENT & PLAN NOTE
Patient's (Body mass index is 28.7 kg/m².) indicates that they are overweight (BMI 25-29.9) with obesity-related health conditions that include dyslipidemias and GERD . Obesity is unchanged. BMI is is above average; BMI management plan is completed. We discussed portion control, increasing exercise and pharmacologic options including supplements.   Topics of discussion included obesity as a disease, nutritional education on food groups, exercise, and medications. Patient was instructed in adequate protein, controlled carb and controlled fat intake.   Patient received instructions on using the medicines as a tool in controlling their weight with nutritional and behavioral changes. Risks and benefits were discussed. I believe the potential benefits of medication helping to decrease weight outweighs the risks. Patient is to try nutritonal/behavioral changes only first.   Patient received our clinic education booklet.   Our patient consent form was reviewed including potential risks of weight loss. We also reviewed our confidentiality and HIPPA statements. Patients current FITT score was reviewed along with current capability for exercise tolerance and a patient will work towards a FITT score of:     Frequency   Intensity Time Strength Training   []   0 None  []   0 None  []   0 None  []   0 None    []   1 (1-2x/week) []   1 (light) []   1 (<10 min) []   1 (1x/week)   [x]   2 (3-5x/week) [x]   2 (moderate) []   2 (10-20 min) [x]   2 (2x/week)   []   3 (daily)   []   3 (moderately hard)  []   4 (very hard) []   3 (20-30 min)  [x]   4 (>30 min) []   3 (3-4x/week)       Patient's past medical history was reviewed in detail and barriers to weight loss were identified and discussed. Past efforts at weight reduction on their own as well as under physician supervision were documented and discussed.  I advised patient to continue routine care with their Primary Care Provider.     Nutritional recommendations and goals were  reviewed including Calories:0766-8120 daily adjusted for exercise calories burnt, Protein:  g daily, Net carbs (total carb - fiber) of 50-75g per day.  Start to keep a food journal and bring into next visit in 2 weeks for review. Practice the behavioral modification technique of mindful eating. Take one MVI daily and 2000mg fish oil daily. Take other medications and supplements as directed.

## 2021-04-23 LAB
AMPHETAMINES UR QL SCN: NEGATIVE NG/ML
BARBITURATES UR QL SCN: NEGATIVE NG/ML
BENZODIAZ UR QL SCN: NEGATIVE NG/ML
BZE UR QL SCN: NEGATIVE NG/ML
CANNABINOIDS UR QL SCN: NEGATIVE NG/ML
CREAT UR-MCNC: 25.2 MG/DL (ref 20–300)
LABORATORY COMMENT REPORT: NORMAL
METHADONE UR QL SCN: NEGATIVE NG/ML
OPIATES UR QL SCN: NEGATIVE NG/ML
OXYCODONE+OXYMORPHONE UR QL SCN: NEGATIVE NG/ML
PCP UR QL: NEGATIVE NG/ML
PH UR: 7.2 [PH] (ref 4.5–8.9)
PROPOXYPH UR QL SCN: NEGATIVE NG/ML

## 2021-06-23 ENCOUNTER — OFFICE VISIT (OUTPATIENT)
Dept: BARIATRICS/WEIGHT MGMT | Facility: CLINIC | Age: 41
End: 2021-06-23

## 2021-06-23 VITALS
OXYGEN SATURATION: 100 % | RESPIRATION RATE: 18 BRPM | HEART RATE: 69 BPM | BODY MASS INDEX: 28.26 KG/M2 | SYSTOLIC BLOOD PRESSURE: 104 MMHG | TEMPERATURE: 97.7 F | HEIGHT: 68 IN | WEIGHT: 186.5 LBS | DIASTOLIC BLOOD PRESSURE: 62 MMHG

## 2021-06-23 DIAGNOSIS — E66.3 OVERWEIGHT (BMI 25.0-29.9): Primary | ICD-10-CM

## 2021-06-23 DIAGNOSIS — E28.2 PCOS (POLYCYSTIC OVARIAN SYNDROME): ICD-10-CM

## 2021-06-23 PROCEDURE — 99213 OFFICE O/P EST LOW 20 MIN: CPT | Performed by: NURSE PRACTITIONER

## 2021-06-23 RX ORDER — TOPIRAMATE 25 MG/1
TABLET ORAL
Qty: 60 TABLET | Refills: 2 | Status: SHIPPED | OUTPATIENT
Start: 2021-06-23 | End: 2021-07-28 | Stop reason: SDUPTHER

## 2021-06-23 RX ORDER — CHLORAL HYDRATE 500 MG
1000 CAPSULE ORAL
COMMUNITY
Start: 2021-04-22 | End: 2021-06-23 | Stop reason: SDUPTHER

## 2021-06-23 RX ORDER — PHENTERMINE HYDROCHLORIDE 37.5 MG/1
TABLET ORAL
Qty: 14 TABLET | Refills: 0 | Status: SHIPPED | OUTPATIENT
Start: 2021-06-23 | End: 2021-07-20 | Stop reason: SDUPTHER

## 2021-06-23 RX ORDER — SERTRALINE HYDROCHLORIDE 100 MG/1
150 TABLET, FILM COATED ORAL DAILY
COMMUNITY
Start: 2021-06-14

## 2021-06-23 NOTE — PROGRESS NOTES
"Chief Complaint  Follow-up and Obesity    Subjective          Mariel Fox presents to [facility] SURGERY for obesity management. LSG 2/4/2020 with Dr. Ojeda. Coexisting conditions include hyperlipidemia, PCOS, chronic back pain, dyspepsia, psoriatic arthritis, depression with anxiety, and fatigue. Appetite is moderately controlled. Traveling a lot with work and kids. Doing better with packing food with her. Most days hitting water goal. Also doing better with portions, not eating so much that she feels sick. Did see GYN and she felt that the frequent and heavy periods are related to weight loss. Does plan on getting an IUD. Reports no side effects of prescribed medications today.  Change in weight since last visit: +0.5 lb gain  Patient's Body mass index is 28.78 kg/m².   The patient is exercising with a FITT score of: 2-1-3-2. Has started back at the gym 2-3 days a week.   Body composition analysis completed and showed:   %body fat:40.2%  Total fat mass (in lbs):75.0 lb    VS   Vitals:    06/23/21 0915   BP: 104/62   BP Location: Left arm   Patient Position: Sitting   Cuff Size: Adult   Pulse: 69   Resp: 18   Temp: 97.7 °F (36.5 °C)   TempSrc: Temporal   SpO2: 100%   Weight: 84.6 kg (186 lb 8 oz)   Height: 171.5 cm (67.5\")       Measurements (in inches)  Waist: 33\"    Objective   Vital Signs:   /62 (BP Location: Left arm, Patient Position: Sitting, Cuff Size: Adult)   Pulse 69   Temp 97.7 °F (36.5 °C) (Temporal)   Resp 18   Ht 171.5 cm (67.5\")   Wt 84.6 kg (186 lb 8 oz)   SpO2 100%   BMI 28.78 kg/m²     Physical Exam   General appears stated age and normal appearance   HEENT PERRLA, EOM intact and conjunctivae normal   Chest/lungs Normal rate, Regular rhythm and Breathing is unlabored   Extremities without edema   Neuro Good historian and No focal deficit   Skin Warm, dry, intact   Psych normal behavior, normal thought content and normal concentration     Result Review " :                 Assessment and Plan    Diagnoses and all orders for this visit:    1. Overweight (BMI 25.0-29.9) (Primary)  Assessment & Plan:  Patient's (Body mass index is 28.78 kg/m².) indicates that they are overweight (BMI 25-29.9) with obesity-related health conditions that include dyslipidemias and arthritis, PCOS, back pain . Obesity is unchanged. BMI is is above average; BMI management plan is completed. We discussed low calorie, low carb based diet program, portion control, increasing exercise and pharmacologic options including phentermine and topiramate.     I have instructed the patient to continue with pursuit of medical weight loss as a part of this program. Patient does meet criteria for use of anorectics at this time as BMI >27.     Continue nutritional focus and work towards new exercise FITT goal of: 2-2-4-2.  The current plan for this month includes: Continue with lifestyle changes- portion control, packing meals/snacks, regular physical activity. Start phentermine and topiramate. Goal 6-8lb.     After review of Ms. Mariel DOUGLAS Close lab work, EKG, urine drug screen, PMH, and medical risk factors, it has been decided that it is medically appropriate to use an anorectic medication for assistance of weight loss. It is felt that the risks of staying at current body fat percentage and potential adverse co-morbid conditions outweighs the risk of medication use. Medication risks and benefits were again reviewed with patient. she has signed the medication agreement form & has decided to try the use of an anorectic medication for the assistance of weight loss.         Orders:  -     phentermine (ADIPEX-P) 37.5 MG tablet; Take 1/2 tab at 11 am.  Dispense: 14 tablet; Refill: 0  -     topiramate (Topamax) 25 MG tablet; Take one daily at 11am for a week then increase to 2 daily at 11am  Dispense: 60 tablet; Refill: 2    2. PCOS (polycystic ovarian syndrome)  Assessment & Plan:  Getting IUD. Low carb diet and  weight reduction should help.         I spent 27 minutes caring for Mariel on this date of service. This time includes time spent by me in the following activities:preparing for the visit, reviewing tests, performing a medically appropriate examination and/or evaluation , counseling and educating the patient/family/caregiver, ordering medications, tests, or procedures and documenting information in the medical record  Follow Up   Return in about 4 weeks (around 7/21/2021) for Next scheduled follow up.  Patient was given instructions and counseling regarding her condition or for health maintenance advice. Please see specific information pulled into the AVS if appropriate.     Racheal Gonsalez, APRN

## 2021-06-23 NOTE — ASSESSMENT & PLAN NOTE
Patient's (Body mass index is 28.78 kg/m².) indicates that they are overweight (BMI 25-29.9) with obesity-related health conditions that include dyslipidemias and arthritis, PCOS, back pain . Obesity is unchanged. BMI is is above average; BMI management plan is completed. We discussed low calorie, low carb based diet program, portion control, increasing exercise and pharmacologic options including phentermine and topiramate.     I have instructed the patient to continue with pursuit of medical weight loss as a part of this program. Patient does meet criteria for use of anorectics at this time as BMI >27.     Continue nutritional focus and work towards new exercise FITT goal of: 2-2-4-2.  The current plan for this month includes: Continue with lifestyle changes- portion control, packing meals/snacks, regular physical activity. Start phentermine and topiramate. Goal 6-8lb.     After review of Ms. Mariel DOUGLAS Close lab work, EKG, urine drug screen, PMH, and medical risk factors, it has been decided that it is medically appropriate to use an anorectic medication for assistance of weight loss. It is felt that the risks of staying at current body fat percentage and potential adverse co-morbid conditions outweighs the risk of medication use. Medication risks and benefits were again reviewed with patient. she has signed the medication agreement form & has decided to try the use of an anorectic medication for the assistance of weight loss.

## 2021-07-15 ENCOUNTER — PATIENT MESSAGE (OUTPATIENT)
Dept: BARIATRICS/WEIGHT MGMT | Facility: CLINIC | Age: 41
End: 2021-07-15

## 2021-07-15 DIAGNOSIS — E66.3 OVERWEIGHT (BMI 25.0-29.9): ICD-10-CM

## 2021-07-20 RX ORDER — PHENTERMINE HYDROCHLORIDE 37.5 MG/1
TABLET ORAL
Qty: 4 TABLET | Refills: 0 | Status: SHIPPED | OUTPATIENT
Start: 2021-07-20 | End: 2021-07-28 | Stop reason: SDUPTHER

## 2021-07-20 NOTE — TELEPHONE ENCOUNTER
From: Mariel DOUGLAS Close  To: ROCCO Figueroa  Sent: 7/15/2021 2:13 PM EDT  Subject: Non-Urgent Medical Question    I thought I had replied to your last response. You did not have any appointments available for 4 weeks, I will be out of the medication on July 21 and do have follow up for July 28 scheduled. Will you be able to refill it for the week in between?

## 2021-07-28 ENCOUNTER — OFFICE VISIT (OUTPATIENT)
Dept: BARIATRICS/WEIGHT MGMT | Facility: CLINIC | Age: 41
End: 2021-07-28

## 2021-07-28 VITALS
OXYGEN SATURATION: 99 % | SYSTOLIC BLOOD PRESSURE: 122 MMHG | HEART RATE: 76 BPM | TEMPERATURE: 97.7 F | RESPIRATION RATE: 18 BRPM | WEIGHT: 179 LBS | BODY MASS INDEX: 27.13 KG/M2 | HEIGHT: 68 IN | DIASTOLIC BLOOD PRESSURE: 62 MMHG

## 2021-07-28 DIAGNOSIS — E66.3 OVERWEIGHT (BMI 25.0-29.9): Primary | ICD-10-CM

## 2021-07-28 PROCEDURE — 99212 OFFICE O/P EST SF 10 MIN: CPT | Performed by: NURSE PRACTITIONER

## 2021-07-28 RX ORDER — PHENTERMINE HYDROCHLORIDE 37.5 MG/1
TABLET ORAL
Qty: 30 TABLET | Refills: 0 | Status: SHIPPED | OUTPATIENT
Start: 2021-07-28 | End: 2021-08-26 | Stop reason: SDUPTHER

## 2021-07-28 RX ORDER — TOPIRAMATE 25 MG/1
TABLET ORAL
Qty: 60 TABLET | Refills: 2 | Status: SHIPPED | OUTPATIENT
Start: 2021-07-28 | End: 2021-09-23 | Stop reason: SINTOL

## 2021-07-28 NOTE — PROGRESS NOTES
"Chief Complaint  Follow-up    Subjective          Mariel Fox presents to [facility] for obesity management.  LSG 2020 with Dr. Ojeda.     Patient is unsure with weight loss progress. Started topiramate and phentermine last visit. Appetite is well controlled. Reports no side effects of prescribed medications today but bowel movements have been less frequent. Didn't get an IUD yet, still waiting to do that, must be menstruating. Life has been very busy in July. She continues to walk/run regularly but hasn't made it to the gym as much. The patient is exercisin-1-4-2. She is not keeping a food journal. She does sometimes feel dizzy after taking her medications, may be related to skipping breakfast.  Change in weight since last visit: -7.5 LB LOSS    Body composition analysis completed and showed:   %body fat:39.1%  Total fat mass (in lbs):70.0 LB    VS   Vitals:    21 1102   BP: 122/62   BP Location: Left arm   Patient Position: Sitting   Cuff Size: Adult   Pulse: 76   Resp: 18   Temp: 97.7 °F (36.5 °C)   TempSrc: Temporal   SpO2: 99%   Weight: 81.2 kg (179 lb)   Height: 171.5 cm (67.5\")   PainSc: 0-No pain       Measurements (in inches)  Waist: 33.5    Objective   Vital Signs:   /62 (BP Location: Left arm, Patient Position: Sitting, Cuff Size: Adult)   Pulse 76   Temp 97.7 °F (36.5 °C) (Temporal)   Resp 18   Ht 171.5 cm (67.5\")   Wt 81.2 kg (179 lb)   SpO2 99%   BMI 27.62 kg/m²     Physical Exam   General appears stated age and normal appearance   HEENT PERRLA, EOM intact and conjunctivae normal   Chest/lungs Normal rate, Regular rhythm and Breathing is unlabored   Extremities without edema   Neuro Good historian and No focal deficit   Skin Warm, dry, intact   Psych normal behavior, normal thought content and normal concentration     Result Review :                 Assessment and Plan    Diagnoses and all orders for this visit:    1. Overweight (BMI " 25.0-29.9) (Primary)  Assessment & Plan:  Patient's (Body mass index is 27.62 kg/m².) indicates that they are overweight (BMI 25-29.9) with obesity-related health conditions that include dyslipidemias . Obesity is improving with treatment. BMI is is above average; BMI management plan is completed. We discussed low calorie, low carb based diet program, portion control, increasing exercise and pharmacologic options including phentermine and topiramate.    I have instructed the patient to continue with pursuit of medical weight loss as a part of this program. Patient does meet criteria for use of anorectics at this time as BMI >27 and is not at treatment goal.     Continue nutritional focus and work towards new exercise FITT goal of:  2-2-4-2.  The current plan for this month includes: Add back regular strength training, try apple fitness+. Avoid skipping breakfast. Continue current medications.        Orders:  -     topiramate (Topamax) 25 MG tablet; Take one daily at 11am and one daily at 3pm.  Dispense: 60 tablet; Refill: 2  -     phentermine (ADIPEX-P) 37.5 MG tablet; Take 1/2 tab at 11 am.  Dispense: 30 tablet; Refill: 0      I spent 18 minutes caring for Mariel on this date of service. This time includes time spent by me in the following activities:preparing for the visit, performing a medically appropriate examination and/or evaluation , counseling and educating the patient/family/caregiver, ordering medications, tests, or procedures and documenting information in the medical record  Follow Up   Return in about 4 weeks (around 8/25/2021) for Next scheduled follow up.  Patient was given instructions and counseling regarding her condition or for health maintenance advice. Please see specific information pulled into the AVS if appropriate.     ROCCO Calero

## 2021-07-28 NOTE — ASSESSMENT & PLAN NOTE
Patient's (Body mass index is 27.62 kg/m².) indicates that they are overweight (BMI 25-29.9) with obesity-related health conditions that include dyslipidemias . Obesity is improving with treatment. BMI is is above average; BMI management plan is completed. We discussed low calorie, low carb based diet program, portion control, increasing exercise and pharmacologic options including phentermine and topiramate.    I have instructed the patient to continue with pursuit of medical weight loss as a part of this program. Patient does meet criteria for use of anorectics at this time as BMI >27 and is not at treatment goal.     Continue nutritional focus and work towards new exercise FITT goal of:  2-2-4-2.  The current plan for this month includes: Add back regular strength training, try apple fitness+. Avoid skipping breakfast. Continue current medications.

## 2021-08-26 ENCOUNTER — TELEMEDICINE (OUTPATIENT)
Dept: BARIATRICS/WEIGHT MGMT | Facility: CLINIC | Age: 41
End: 2021-08-26

## 2021-08-26 VITALS — BODY MASS INDEX: 26.98 KG/M2 | WEIGHT: 178 LBS | HEIGHT: 68 IN

## 2021-08-26 DIAGNOSIS — E66.3 OVERWEIGHT (BMI 25.0-29.9): ICD-10-CM

## 2021-08-26 PROCEDURE — 99212 OFFICE O/P EST SF 10 MIN: CPT | Performed by: NURSE PRACTITIONER

## 2021-08-26 RX ORDER — PHENTERMINE HYDROCHLORIDE 37.5 MG/1
TABLET ORAL
Qty: 30 TABLET | Refills: 0 | Status: SHIPPED | OUTPATIENT
Start: 2021-08-26 | End: 2021-09-23 | Stop reason: SDUPTHER

## 2021-08-26 RX ORDER — LORATADINE 10 MG/1
10 TABLET ORAL DAILY
COMMUNITY
Start: 2021-07-30 | End: 2021-10-21

## 2021-08-26 NOTE — PROGRESS NOTES
"Chief Complaint  Follow-up and Obesity    Subjective          The use of a video visit has been reviewed with the patient and verbal informed consent has been obtained.    Mariel Fox presents to [facility] for obesity management and weight regain following MBS.    Start weight: 186 pounds.  Today's weight is 80.7 kg (178 lb) pounds and weight loss since initiation of medical weight loss is -8 pounds.  Patient's Body mass index is 27.47 kg/m².     Change in weight since last visit:     Patient is satisfied with weight loss progress. Appetite is moderately controlled, maybe has increased a litle. Has felt more depression and not feeling herself and feels like it might be related to topiramate. Noticed it a lot at her friend's house over the weekend, her kids pointed out that she seems tired a lot. Focus on weight loss has been about 5 out of 10. Exercise is sporadic depending on work schedule and kids schedule.  Kids started back at school yesterday, plans on exercising before she goes to work.     VS   Vitals:    08/26/21 1412   Weight: 80.7 kg (178 lb)   Height: 171.5 cm (67.5\")     Measurements (in inches)  Waist: 32\"    Objective   Vital Signs:   Ht 171.5 cm (67.5\")   Wt 80.7 kg (178 lb)   BMI 27.47 kg/m²     Physical Exam     General appears stated age and normal appearance   HEENT PERRLA and conjunctivae normal   Chest/lungs Breathing is unlabored   Neuro Good historian and No focal deficit   Psych normal behavior, normal thought content and normal concentration     Result Review :                 Assessment and Plan    Diagnoses and all orders for this visit:    1. Overweight (BMI 25.0-29.9)  Assessment & Plan:  Patient's (Body mass index is 27.47 kg/m².) indicates that they are overweight with health conditions that include dyslipidemias and PCOS . Weight is improving with treatment. BMI is is above average; BMI management plan is completed. We discussed portion " control, increasing exercise and pharmacologic options including phentermine.    I have instructed the patient to continue with pursuit of medical weight loss as a part of this program. Patient does meet criteria for use of anorectics at this time as BMI >27 and is not at treatment goal.     Continue nutritional focus and work towards new exercise FITT goal of: 2-3-4-3.  The current plan for this month includes: Exercise at least 4 days a week. Taper off of topiramate due to depressed mood. Increase focus, goal 4-6lb. Consider med change next visit (trokendi, phendimetrazine, diethylpropion).           Orders:  -     phentermine (ADIPEX-P) 37.5 MG tablet; Take 1/2 tab at 11 am.  Dispense: 30 tablet; Refill: 0      I spent 22 minutes caring for Mariel on this date of service. This time includes time spent by me in the following activities:obtaining and/or reviewing a separately obtained history, performing a medically appropriate examination and/or evaluation , counseling and educating the patient/family/caregiver, ordering medications, tests, or procedures and documenting information in the medical record  Follow Up   Return in about 4 weeks (around 9/23/2021) for Next scheduled follow up.  Patient was given instructions and counseling regarding her condition or for health maintenance advice. Please see specific information pulled into the AVS if appropriate.     ROCCO Calero

## 2021-08-26 NOTE — ASSESSMENT & PLAN NOTE
Patient's (Body mass index is 27.47 kg/m².) indicates that they are overweight with health conditions that include dyslipidemias and PCOS . Weight is improving with treatment. BMI is is above average; BMI management plan is completed. We discussed portion control, increasing exercise and pharmacologic options including phentermine.    I have instructed the patient to continue with pursuit of medical weight loss as a part of this program. Patient does meet criteria for use of anorectics at this time as BMI >27 and is not at treatment goal.     Continue nutritional focus and work towards new exercise FITT goal of: 2-3-4-3.  The current plan for this month includes: Exercise at least 4 days a week. Taper off of topiramate due to depressed mood. Increase focus, goal 4-6lb. Consider med change next visit (trokendi, phendimetrazine, diethylpropion).

## 2021-09-23 ENCOUNTER — TELEMEDICINE (OUTPATIENT)
Dept: BARIATRICS/WEIGHT MGMT | Facility: CLINIC | Age: 41
End: 2021-09-23

## 2021-09-23 VITALS — HEIGHT: 68 IN | WEIGHT: 176.2 LBS | BODY MASS INDEX: 26.7 KG/M2 | TEMPERATURE: 97.9 F

## 2021-09-23 DIAGNOSIS — E66.3 OVERWEIGHT (BMI 25.0-29.9): ICD-10-CM

## 2021-09-23 PROCEDURE — 99212 OFFICE O/P EST SF 10 MIN: CPT | Performed by: NURSE PRACTITIONER

## 2021-09-23 RX ORDER — PHENTERMINE HYDROCHLORIDE 37.5 MG/1
TABLET ORAL
Qty: 30 TABLET | Refills: 0 | Status: SHIPPED | OUTPATIENT
Start: 2021-09-23 | End: 2021-10-21 | Stop reason: ALTCHOICE

## 2021-09-23 RX ORDER — PHENDIMETRAZINE TARTRATE 35 MG/1
TABLET ORAL
Qty: 28 EACH | Refills: 0 | Status: SHIPPED | OUTPATIENT
Start: 2021-09-23 | End: 2021-10-21 | Stop reason: ALTCHOICE

## 2021-09-23 NOTE — ASSESSMENT & PLAN NOTE
Patient's (Body mass index is 27.19 kg/m².) indicates that they are overweight with health conditions that include dyslipidemias and PCOS, chronic pain . Weight is improving with treatment. BMI is is above average; BMI management plan is completed. We discussed low calorie, low carb based diet program, portion control, increasing exercise, management of depression/anxiety/stress to control compensatory eating, pharmacologic options including phenterrmine and an kesha-based approach such as [employer] Pal or Lose It.     I have instructed the patient to continue with pursuit of medical weight loss as a part of this program. Patient does meet criteria for use of anorectics at this time as BMI >27 and is not at treatment goal.     Continue nutritional focus and work towards new exercise FITT goal of:   The current plan for this month includes: continue current exercise efforts, it is appropriate to continue anorectic medications as prescribed at this time, continue to work on lifestyle behavioral changes and start food journal. Start phendimetrazine in the afternoon.

## 2021-09-23 NOTE — PROGRESS NOTES
"     Office Note      Date: 2021  Patient Name: Mariel Fox  MRN: [id number]  : 1980    Patient presents during the COVID-19 pandemic/federally declared state of public health emergency.  This service was conducted via Zoom.  Patient is located in her parked vehicle in a restaurant parking lot.  Provider is located at her primary office location. The use of a video visit has been reviewed with the patient and verbal informed consent has been obtained.  Subjective  Subjective     Chief Complaint  Obesity Management follow-up    Subjective          Mariel Fox presents to [facility] via telehealth for obesity management. Patient is satisfied with weight loss progress. Appetite is moderately controlled. Reports no side effects of prescribed medications today. She tapered off of the topamax as discussed at her last visit. She tried to increase the phentermine to a whole tab but felt less full and found herself snacking more. She has been stressed- her family is in quarantine since her daughter was diagnosed with COVID-19. Not doing a food journal at this time.  The patient is exercising- Walk/jogging outside or going to the gym 3-4X/week in the mornings. Doing arms and legs at the gym.    Review of Systems   Constitutional: Negative for fever.   Eyes: Negative for blurred vision and visual disturbance.   Cardiovascular: Negative for chest pain and palpitations.   Gastrointestinal: Negative for constipation.   Neurological: Positive for headache.   Psychiatric/Behavioral: Negative for dysphoric mood and sleep disturbance. The patient is not nervous/anxious.      Objective   Body mass index is 27.19 kg/m².  Start weight: 186 pounds.    Total weight loss: -10 pounds/5%  Change in weight since last visit: -2  Measurements (in inches)  Waist: 32\"  Temp 97.9 °F (36.6 °C)   Ht 171.5 cm (67.5\")   Wt 79.9 kg (176 lb 3.2 oz)   BMI 27.19 kg/m²       Result Review :        "          Assessment and Plan    Diagnoses and all orders for this visit:    1. Overweight (BMI 25.0-29.9)  Assessment & Plan:  Patient's (Body mass index is 27.19 kg/m².) indicates that they are overweight with health conditions that include dyslipidemias and PCOS, chronic pain . Weight is improving with treatment. BMI is is above average; BMI management plan is completed. We discussed low calorie, low carb based diet program, portion control, increasing exercise, management of depression/anxiety/stress to control compensatory eating, pharmacologic options including phenterrmine and an kesha-based approach such as [employer] Pal or Lose It.     I have instructed the patient to continue with pursuit of medical weight loss as a part of this program. Patient does meet criteria for use of anorectics at this time as BMI >27 and is not at treatment goal.     Continue nutritional focus and work towards new exercise FITT goal of:   The current plan for this month includes: continue current exercise efforts, it is appropriate to continue anorectic medications as prescribed at this time, continue to work on lifestyle behavioral changes and start food journal. Start phendimetrazine in the afternoon.       Orders:  -     Phendimetrazine Tartrate 35 MG tablet; Take 1 tablet at 3 pm.  Dispense: 28 each; Refill: 0  -     phentermine (ADIPEX-P) 37.5 MG tablet; Take 1/2 tab at 11 am.  Dispense: 30 tablet; Refill: 0      I spent 26 minutes caring for Mariel on this date of service. This time includes time spent by me in the following activities:obtaining and/or reviewing a separately obtained history, performing a medically appropriate examination and/or evaluation , counseling and educating the patient/family/caregiver, ordering medications, tests, or procedures and documenting information in the medical record  Follow Up   Return in about 4 weeks (around 10/21/2021) for Next scheduled follow up.  Patient was given instructions and  counseling regarding her condition or for health maintenance advice. Please see specific information pulled into the AVS if appropriate.     ROCCO Calero

## 2021-09-28 ENCOUNTER — PATIENT MESSAGE (OUTPATIENT)
Dept: BARIATRICS/WEIGHT MGMT | Facility: CLINIC | Age: 41
End: 2021-09-28

## 2021-10-21 ENCOUNTER — OFFICE VISIT (OUTPATIENT)
Dept: BARIATRICS/WEIGHT MGMT | Facility: CLINIC | Age: 41
End: 2021-10-21

## 2021-10-21 VITALS
WEIGHT: 178 LBS | RESPIRATION RATE: 18 BRPM | OXYGEN SATURATION: 98 % | TEMPERATURE: 98.4 F | HEIGHT: 68 IN | BODY MASS INDEX: 26.98 KG/M2 | DIASTOLIC BLOOD PRESSURE: 66 MMHG | SYSTOLIC BLOOD PRESSURE: 120 MMHG

## 2021-10-21 DIAGNOSIS — E78.2 MIXED HYPERLIPIDEMIA: ICD-10-CM

## 2021-10-21 DIAGNOSIS — E28.2 PCOS (POLYCYSTIC OVARIAN SYNDROME): ICD-10-CM

## 2021-10-21 DIAGNOSIS — E66.3 OVERWEIGHT (BMI 25.0-29.9): Primary | ICD-10-CM

## 2021-10-21 PROCEDURE — 99214 OFFICE O/P EST MOD 30 MIN: CPT | Performed by: NURSE PRACTITIONER

## 2021-10-21 RX ORDER — LIRAGLUTIDE 6 MG/ML
0.6 INJECTION, SOLUTION SUBCUTANEOUS ONCE
Qty: 3 ML | Refills: 0 | COMMUNITY
Start: 2021-10-21 | End: 2021-10-21

## 2021-10-21 RX ORDER — DIETHYLPROPION HYDROCHLORIDE 25 MG/1
TABLET ORAL
Qty: 84 EACH | Refills: 0 | Status: SHIPPED | OUTPATIENT
Start: 2021-10-21 | End: 2021-11-18 | Stop reason: SDUPTHER

## 2021-10-21 NOTE — PROGRESS NOTES
Office Note      Date: 10/21/2021  Patient Name: Mariel Fox  MRN: [id number]  : 1980    Chief Complaint  Obesity and Follow-up  Subjective  Subjective           Mariel Fox presents to [facility] for obesity management. LSG 2020 with Dr. Ojeda. Presurgery weight: 249 pounds. Goal 165lb. Patient is satisfied with weight loss progress. Appetite is moderately controlled. Reports worsening insomnia, tried to cut phendimetrazine in half and still woke up in the middle of the night. Interested in other medications to help her get to her goal of 165lb. The patient is taking multivitamin and is taking fish oil.  The patient is using a food journal. Life is still very busy.   The patient is exercising with a FITT score of:    Frequency Intensity Time Strength Training   []   0, none []   0 []   0 []   0   [x]   1 (1-2x/week) []   1 (light) []   1 (<10 min) []   1 (1x/week)   []   2 (3-5x/week) [x]   2 (moderate) []   2 (10-20 min) [x]   2 (2x/week)   []   3 (daily) []   3 (moderately hard)  []   4 (very hard) []   3 (20-30 min)  [x]   4 (>30 min) []   3 (3-4x/week)       Review of Systems   Constitutional: Negative for fatigue.   Eyes: Negative for visual disturbance.   Cardiovascular: Negative for chest pain and palpitations.   Gastrointestinal: Negative for abdominal pain, constipation, diarrhea, nausea and GERD.   Neurological: Negative for memory problem.        Parasthesias negative   Psychiatric/Behavioral: Positive for sleep disturbance. Negative for depressed mood. The patient is not nervous/anxious.        Objective    Body mass index is 27.06 kg/m².   Start weight:186pounds.    Total Loss/%Loss of BBW: -5.65/-10.5  Change in weight since last visit: -3.5  Body composition analysis completed and showed:   %body fat: 36.4  Measurements (in inches)  Waist: 33  Vital Signs:   /66 (BP Location: Left arm, Patient Position: Sitting, Cuff Size: Adult)    "Temp 98.4 °F (36.9 °C) (Temporal)   Resp 18   Ht 172.7 cm (68\")   Wt 80.7 kg (178 lb)   SpO2 98%   BMI 27.06 kg/m²     Physical Exam   General appears stated age and normal appearance   HEENT PERRLA, EOM intact and conjunctivae normal   Chest/lungs Normal rate, Regular rhythm and Breathing is unlabored   Extremities without edema   Neuro Good historian and No focal deficit   Skin Warm, dry, intact   Psych normal behavior, normal thought content and normal concentration     Result Review :                Assessment / Plan        Diagnoses and all orders for this visit:    1. Overweight (BMI 25.0-29.9) (Primary)  Assessment & Plan:  Patient's (Body mass index is 27.06 kg/m².) indicates that they are overweight with health conditions that include dyslipidemias and RA, PCOS . Weight is improving with treatment. BMI is is above average; BMI management plan is completed. We discussed low calorie, low carb based diet program, portion control, increasing exercise and pharmacologic options including diethylpropion and saxenda.    I have instructed the patient to continue with pursuit of medical weight loss as a part of this program. Patient does meet criteria for use of anorectics at this time as BMI >27 and is not at treatment goal.     Continue nutritional focus and work towards new exercise FITT goal of: 2-2-4-2.   The current plan for this month includes: Keep up the good focus on protein intake. DC phentermine and phendimetrazine and start diethylpropion. Increase as tolerated with insomnia. Restart saxenda, took prior to surgery and tolerated well. Discussed option of wegovy, patient prefers flexible dosing and reliable supply.    Start Saxenda. Denies family or personal history of pancreatitis, MTC, or MEN 2. Discussed common side effects of nausea, diarrhea, vomiting, constipation, stomach pain, headache, fatigue, upset stomach, dizziness, feeling bloated, belching, gas, stomach flu, heartburn. Sample pen " provided to patient along with content regarding use of the pen, dosing schedule, savings opportunities, and helpful tips.          Orders:  -     Diethylpropion HCl 25 MG tablet; Take one tablet at 7am, one tablet at 11am, and one tablet at 3pm.  Dispense: 84 each; Refill: 0  -     Liraglutide (SAXENDA) 18 MG/3ML injection pen; Inject 3 mg under the skin into the appropriate area as directed Daily for 30 days.  Dispense: 5 pen; Refill: 0  -     Insulin Pen Needle 32G X 4 MM misc; 1 dose Daily.  Dispense: 90 each; Refill: 2  -     Liraglutide (Saxenda) 18 MG/3ML injection pen; Inject 0.6 mg under the skin into the appropriate area as directed 1 (One) Time for 1 dose.  Dispense: 3 mL; Refill: 0    2. PCOS (polycystic ovarian syndrome)  Assessment & Plan:  Weight reduction, low carb diet and regular physical activity      3. Mixed hyperlipidemia  Assessment & Plan:  Weight reduction.         I spent 30 minutes caring for Mariel on this date of service. This time includes time spent by me in the following activities:obtaining and/or reviewing a separately obtained history, performing a medically appropriate examination and/or evaluation , counseling and educating the patient/family/caregiver, ordering medications, tests, or procedures and documenting information in the medical record  Follow Up   Return in about 4 weeks (around 11/18/2021) for Next scheduled follow up.  Patient was given instructions and counseling regarding her condition or for health maintenance advice. Please see specific information pulled into the AVS if appropriate.     Racheal Gonsalez, APRN  10/21/2021

## 2021-10-21 NOTE — ASSESSMENT & PLAN NOTE
Patient's (Body mass index is 27.06 kg/m².) indicates that they are overweight with health conditions that include dyslipidemias and RA, PCOS . Weight is improving with treatment. BMI is is above average; BMI management plan is completed. We discussed low calorie, low carb based diet program, portion control, increasing exercise and pharmacologic options including diethylpropion and saxenda.    I have instructed the patient to continue with pursuit of medical weight loss as a part of this program. Patient does meet criteria for use of anorectics at this time as BMI >27 and is not at treatment goal.     Continue nutritional focus and work towards new exercise FITT goal of: 2-2-4-2.   The current plan for this month includes: Keep up the good focus on protein intake. DC phentermine and phendimetrazine and start diethylpropion. Increase as tolerated with insomnia. Restart saxenda, took prior to surgery and tolerated well. Discussed option of wegovy, patient prefers flexible dosing and reliable supply.    Start Saxenda. Denies family or personal history of pancreatitis, MTC, or MEN 2. Discussed common side effects of nausea, diarrhea, vomiting, constipation, stomach pain, headache, fatigue, upset stomach, dizziness, feeling bloated, belching, gas, stomach flu, heartburn. Sample pen provided to patient along with content regarding use of the pen, dosing schedule, savings opportunities, and helpful tips.

## 2021-11-18 ENCOUNTER — TELEMEDICINE (OUTPATIENT)
Dept: BARIATRICS/WEIGHT MGMT | Facility: CLINIC | Age: 41
End: 2021-11-18

## 2021-11-18 VITALS — WEIGHT: 168.6 LBS | BODY MASS INDEX: 25.55 KG/M2 | HEIGHT: 68 IN

## 2021-11-18 DIAGNOSIS — E66.3 OVERWEIGHT (BMI 25.0-29.9): Primary | ICD-10-CM

## 2021-11-18 DIAGNOSIS — R10.13 DYSPEPSIA: ICD-10-CM

## 2021-11-18 DIAGNOSIS — E78.2 MIXED HYPERLIPIDEMIA: ICD-10-CM

## 2021-11-18 PROCEDURE — 99213 OFFICE O/P EST LOW 20 MIN: CPT | Performed by: NURSE PRACTITIONER

## 2021-11-18 RX ORDER — DIETHYLPROPION HYDROCHLORIDE 25 MG/1
TABLET ORAL
Qty: 92 EACH | Refills: 0 | Status: SHIPPED | OUTPATIENT
Start: 2021-11-18 | End: 2022-01-04 | Stop reason: SDUPTHER

## 2021-11-18 NOTE — ASSESSMENT & PLAN NOTE
Recent worsening of symptoms, likely related to saxenda. Continue prevacid and tums PRN. Decrease saxenda to see if symptoms improve.

## 2021-11-18 NOTE — ASSESSMENT & PLAN NOTE
Patient's (Body mass index is 25.64 kg/m².) indicates that they are overweight with health conditions that include hypertension and psoriatic arthritis . Weight is improving with treatment. BMI is is above average; BMI management plan is completed. We discussed low calorie, low carb based diet program, portion control, increasing exercise, pharmacologic options including saxenda and diethylpropion and an kesha-based approach such as [employer] Pal or Lose It.    I have instructed the patient to continue with pursuit of medical weight loss as a part of this program. Patient does meet criteria for use of anorectics at this time as BMI >25 with , hypercholesterolemia and is not at treatment goal.     Continue nutritional focus and work towards new exercise FITT goal of: 2-2-4-2.   The current plan for this month includes: adjust exercise as discussed, weight loss goal 4-6lbs this month, continue to work on lifestyle behavioral changes and continue nutrition focus. Decrease saxenda and see if indigestion improves.

## 2021-11-18 NOTE — PROGRESS NOTES
Office Note      Date: 2021  Patient Name: Mariel Fox  MRN: [id number]  : 1980    Patient presents during the COVID-19 pandemic/federally declared state of public health emergency.  This service was conducted via [employer].  Patient is located in her parked car in [place]. Provider is located at her primary office location. The use of a video visit has been reviewed with the patient and verbal informed consent has been obtained.  Subjective  Subjective     Chief Complaint  Obesity Management follow-up    Subjective          Mariel Fox presents to [facility] WEIGHT MANAGEMENT via telehealth for obesity management. LSG 2020 with Dr. Ojeda. Presurgery weight: 249 pounds, goal 165lb. Patient is satisfied with weight loss progress. Appetite is well controlled. Diethylpropion is working well. Doing Saxenda 1.2 + 2 clicks. Reports multiple GI symptoms, unsure if it is related to saxenda, took it in the past (prior to surgery) without any problems.  GI symptoms: , Monday, Tuesday most severe with belching, nausea, indigestion. 1 diet soft drink a day for the last 2 months. Stopped a few days ago because read that could cause indigestion. Restarted prevacid. Getting a little better.     Does have some dizziness/lightheadedness when changing positions  The patient is physically active: Not doing as well, mostly just daily activity. Plan is to get to the gym 2 days a week and walking/jogging the other days.   Focus on food choices has been ok, not eating much, trying to prioritize protein.     Review of Systems   Constitutional: Negative for fatigue.   Eyes: Negative for visual disturbance.   Cardiovascular: Positive for palpitations. Negative for chest pain.   Gastrointestinal: Positive for abdominal pain, constipation, diarrhea, nausea, GERD and indigestion.   Neurological: Negative for dizziness, tremors, light-headedness, headache and memory problem.        Parasthesias  "negative   Psychiatric/Behavioral: Negative for sleep disturbance and depressed mood. The patient is not nervous/anxious.      Objective   Body mass index is 25.64 kg/m².  Start weight: 186 pounds.    Total weight loss: 18 pounds/9.6%  Change in weight since last visit: -7lb  Measurements (in inches)  Waist: 31\"  Ht 172.7 cm (68\")   Wt 76.5 kg (168 lb 9.6 oz)   BMI 25.64 kg/m²       Result Review :                 Assessment and Plan    Diagnoses and all orders for this visit:    1. Overweight (BMI 25.0-29.9) (Primary)  Assessment & Plan:  Patient's (Body mass index is 25.64 kg/m².) indicates that they are overweight with health conditions that include hypertension and psoriatic arthritis . Weight is improving with treatment. BMI is is above average; BMI management plan is completed. We discussed low calorie, low carb based diet program, portion control, increasing exercise, pharmacologic options including saxenda and diethylpropion and an kesha-based approach such as Selectica Pal or Lose It.    I have instructed the patient to continue with pursuit of medical weight loss as a part of this program. Patient does meet criteria for use of anorectics at this time as BMI >25 with , hypercholesterolemia and is not at treatment goal.     Continue nutritional focus and work towards new exercise FITT goal of: 2-2-4-2.   The current plan for this month includes: adjust exercise as discussed, weight loss goal 4-6lbs this month, continue to work on lifestyle behavioral changes and continue nutrition focus. Decrease saxenda and see if indigestion improves.        Orders:  -     Diethylpropion HCl 25 MG tablet; Take one tablet at 7am, one tablet at 11am, and one tablet at 3pm.  Dispense: 92 each; Refill: 0    2. Dyspepsia  Assessment & Plan:  Recent worsening of symptoms, likely related to saxenda. Continue prevacid and tums PRN. Decrease saxenda to see if symptoms improve.       3. Mixed hyperlipidemia  Assessment & " Plan:  Weight reduction.           Follow Up   Return in about 6 weeks (around 12/30/2021) for Next scheduled follow up.  Patient was given instructions and counseling regarding her condition or for health maintenance advice. Please see specific information pulled into the AVS if appropriate.     ROCCO Calero

## 2021-12-09 DIAGNOSIS — E66.3 OVERWEIGHT (BMI 25.0-29.9): ICD-10-CM

## 2021-12-09 RX ORDER — LIRAGLUTIDE 6 MG/ML
INJECTION, SOLUTION SUBCUTANEOUS
Qty: 15 ML | Refills: 0 | Status: SHIPPED | OUTPATIENT
Start: 2021-12-09 | End: 2022-02-01

## 2021-12-27 ENCOUNTER — OFFICE VISIT (OUTPATIENT)
Dept: BARIATRICS/WEIGHT MGMT | Facility: CLINIC | Age: 41
End: 2021-12-27

## 2021-12-27 VITALS
WEIGHT: 171 LBS | OXYGEN SATURATION: 99 % | DIASTOLIC BLOOD PRESSURE: 60 MMHG | HEIGHT: 68 IN | SYSTOLIC BLOOD PRESSURE: 124 MMHG | HEART RATE: 77 BPM | BODY MASS INDEX: 25.91 KG/M2 | TEMPERATURE: 98.4 F

## 2021-12-27 DIAGNOSIS — E55.9 HYPOVITAMINOSIS D: ICD-10-CM

## 2021-12-27 DIAGNOSIS — Z13.0 SCREENING, IRON DEFICIENCY ANEMIA: ICD-10-CM

## 2021-12-27 DIAGNOSIS — Z90.3 POSTGASTRECTOMY MALABSORPTION: ICD-10-CM

## 2021-12-27 DIAGNOSIS — R53.83 FATIGUE, UNSPECIFIED TYPE: Primary | ICD-10-CM

## 2021-12-27 DIAGNOSIS — Z13.21 MALNUTRITION SCREEN: ICD-10-CM

## 2021-12-27 DIAGNOSIS — K91.2 POSTGASTRECTOMY MALABSORPTION: ICD-10-CM

## 2021-12-27 DIAGNOSIS — Z98.84 S/P BARIATRIC SURGERY: ICD-10-CM

## 2021-12-27 PROCEDURE — 99214 OFFICE O/P EST MOD 30 MIN: CPT | Performed by: PHYSICIAN ASSISTANT

## 2021-12-27 NOTE — PROGRESS NOTES
[facility] Bariatric Surgery  [address]    [facility] [id number] [phone]        Patient Name:  Mariel Fox  :  1980      Date of Visit: 2021      Reason for Visit:    Almost 2 years postop      HPI: Mariel Fox is a 41 y.o. female s/p LSG 20 w/ Dr. Ojeda    Doing well. Some reflux when Saxenda was increased, but improved on lower dose. No issues/concerns. Denies dysphagia, nausea, vomiting, abdominal pain, hair loss, memory loss, vision changes and numbness/tingling.  Getting ~70g prot/day-not great with tracking.  Drinking 64 fluid oz/day. Last labs w/ bariatric levels wnl. Taking MVI.  Not on PPI.  Physical activity: exercising 3-4 days a week- strength training, walking/jogging. Having abdominoplasty in January.      Presurgery weight: 249 pounds.  Today's weight is 77.6 kg (171 lb) pounds, today's  Body mass index is 26 kg/m²., and weight loss since surgery is 78 pounds.      Past Medical History:   Diagnosis Date   • Anxiety and depression    • Chronic back pain     last steroid injection    • Dyspnea on exertion    • Endometriosis    • Fatigue    • Fatty liver     follows w/ GI ([place]), Mom w/ hx NUNES Cirrhosis, denies prior liver bx   • Fatty liver     follows w/ GI ([place]), Mom w/ hx NUNES Cirrhosis, denies prior liver bx 2020 ALT/AST:  Recent US with GI was normal, was discharged from their care   • Hyperlipidemia    • Hypertension    • Melanoma in situ (HCC)     (R) forearm, resected, no subsequet tx, follows w/ derm   • MTHFR mutation     hx miscarriage x 2, no hx DVT/PE, takes ASA81 mg qod   • Obesity    • PCOS (polycystic ovarian syndrome)     on Spironolactone   • Psoriatic arthritis (HCC)     follows w/ Rheumatology ([place]), on Enbrel/Gabapentin + prn Ibupfroen     Past Surgical History:   Procedure Laterality Date   • D & C HYSTEROSCOPY      x 5   • ENDOSCOPY N/A 2020    Procedure:  ESOPHAGOGASTRODUODENOSCOPY;  Surgeon: Shayy Ojeda MD;  Location:  [facility];  Service: Bariatric;  Laterality: N/A;   • GASTRIC SLEEVE LAPAROSCOPIC N/A 2/4/2020    Procedure: GASTRIC SLEEVE LAPAROSCOPIC;  Surgeon: Shayy Ojeda MD;  Location:  [facility];  Service: Bariatric;  Laterality: N/A;   • PLANTAR FASCIA SURGERY Left 01/19/2021   • SALPINGECTOMY Left 2013    ectopic pregnancy   • SKIN SURGERY      wide local excision right forearm melanoma in situ, also another for dysplastic nevus.   • TARSAL TUNNEL RELEASE Right 2015    (R) foot   • TONSILLECTOMY  1990     Outpatient Medications Marked as Taking for the 12/27/21 encounter (Office Visit) with Mariela Persaud PA   Medication Sig Dispense Refill   • diclofenac (VOLTAREN) 75 MG EC tablet TAKE ONE TABLET BY MOUTH TWICE DAILY     • Diethylpropion HCl 25 MG tablet Take one tablet at 7am, one tablet at 11am, and one tablet at 3pm. 92 each 0   • etanercept (Enbrel) 50 MG/ML solution prefilled syringe injection 50 mg 1 (One) Time Per Week.     • Flibanserin (Addyi) 100 MG tablet Take 100 mg by mouth Daily.     • fluocinonide (LIDEX) 0.05 % external solution Apply  topically to the appropriate area as directed Daily As Needed.     • gabapentin (NEURONTIN) 300 MG capsule Take 300 mg by mouth Daily.  0   • Insulin Pen Needle 32G X 4 MM misc 1 dose Daily. 90 each 2   • levonorgestrel (Mirena, 52 MG,) 20 MCG/24HR IUD 1 each by Intrauterine route.     • multivitamin with minerals (Multivitamin Adults) tablet tablet Take 1 tablet by mouth Daily. 30 tablet 2   • Omega-3 Fatty Acids (fish oil) 1000 MG capsule capsule Take 1 capsule by mouth 2 (Two) Times a Day With Meals.     • Saxenda 18 MG/3ML injection pen INJECT THREE MG UNDER THE SKIN INTO THE APPROPRIATE AREAS AS DIRECTED DAILY 15 mL 0   • sertraline (ZOLOFT) 100 MG tablet Take 150 mg by mouth Daily.     • simvastatin (ZOCOR) 20 MG tablet Take 20 mg by mouth 2 (Two) Times a Week.  1   • spironolactone  "(ALDACTONE) 100 MG tablet Take 100 mg by mouth Daily.         Allergies   Allergen Reactions   • Adhesive Tape Itching and Rash       Social History     Socioeconomic History   • Marital status:    Tobacco Use   • Smoking status: Never Smoker   • Smokeless tobacco: Never Used   Substance and Sexual Activity   • Alcohol use: No   • Drug use: No   • Sexual activity: Defer     Social History     Social History Narrative    Living in [place], [place] w/  and 4 children.  [facility] - [facility].  She is an occupational therapist and owner.         /60   Pulse 77   Temp 98.4 °F (36.9 °C)   Ht 172.7 cm (68\")   Wt 77.6 kg (171 lb)   SpO2 99%   BMI 26.00 kg/m²     Physical Exam  Constitutional:       Appearance: She is obese.   HENT:      Head: Normocephalic and atraumatic.   Cardiovascular:      Rate and Rhythm: Normal rate and regular rhythm.   Pulmonary:      Effort: Pulmonary effort is normal.      Breath sounds: Normal breath sounds.   Abdominal:      General: Bowel sounds are normal. There is no distension.      Palpations: Abdomen is soft. There is no mass.      Tenderness: There is no abdominal tenderness.      Comments: Old lap incisions well-healed   Skin:     General: Skin is warm and dry.   Neurological:      General: No focal deficit present.      Mental Status: She is alert and oriented to person, place, and time.   Psychiatric:         Mood and Affect: Mood normal.         Behavior: Behavior normal.           Assessment:  Almost 2 years s/p LSG 2/4/20 w/ Dr. Ojeda    ICD-10-CM ICD-9-CM   1. Fatigue, unspecified type  R53.83 780.79   2. S/P bariatric surgery  Z98.84 V45.86   3. Postgastrectomy malabsorption  K91.2 579.3    Z90.3    4. Malnutrition screen  Z13.21 V77.2   5. Hypovitaminosis D  E55.9 268.9   6. Screening, iron deficiency anemia  Z13.0 V78.0       Patient's Body mass index is 26 kg/m². indicating that she is overweight (BMI 25-29.9). " Obesity-related health conditions include the following: as above. Obesity is improving with treatment. BMI is is above average; BMI management plan is completed. We discussed low calorie, low carb based diet program, portion control and increasing exercise..      Plan:  Doing well. Continue w/ good food choices and healthy habits.  Continue protein >70g/day.  Continue routine physical activity.  Routine bariatric labs ordered.  Continue vitamins w/ adjustments pending lab results.  Call w/ problems/concerns.     The patient was instructed to follow up in one year, sooner if needed.

## 2021-12-31 LAB
25(OH)D3+25(OH)D2 SERPL-MCNC: 40.4 NG/ML (ref 30–100)
ALBUMIN SERPL-MCNC: 4.3 G/DL (ref 3.8–4.8)
ALBUMIN/GLOB SERPL: 1.5 {RATIO} (ref 1.2–2.2)
ALP SERPL-CCNC: 76 IU/L (ref 44–121)
ALT SERPL-CCNC: 19 IU/L (ref 0–32)
AST SERPL-CCNC: 19 IU/L (ref 0–40)
BASOPHILS # BLD AUTO: 0 X10E3/UL (ref 0–0.2)
BASOPHILS NFR BLD AUTO: 1 %
BILIRUB SERPL-MCNC: 1 MG/DL (ref 0–1.2)
BUN SERPL-MCNC: 17 MG/DL (ref 6–24)
BUN/CREAT SERPL: 21 (ref 9–23)
CALCIUM SERPL-MCNC: 9.2 MG/DL (ref 8.7–10.2)
CHLORIDE SERPL-SCNC: 100 MMOL/L (ref 96–106)
CO2 SERPL-SCNC: 26 MMOL/L (ref 20–29)
CREAT SERPL-MCNC: 0.8 MG/DL (ref 0.57–1)
EOSINOPHIL # BLD AUTO: 0.1 X10E3/UL (ref 0–0.4)
EOSINOPHIL NFR BLD AUTO: 1 %
ERYTHROCYTE [DISTWIDTH] IN BLOOD BY AUTOMATED COUNT: 12.1 % (ref 11.7–15.4)
FERRITIN SERPL-MCNC: 134 NG/ML (ref 15–150)
FOLATE SERPL-MCNC: 12.9 NG/ML
GLOBULIN SER CALC-MCNC: 2.8 G/DL (ref 1.5–4.5)
GLUCOSE SERPL-MCNC: 83 MG/DL (ref 65–99)
HCT VFR BLD AUTO: 39 % (ref 34–46.6)
HGB BLD-MCNC: 13.3 G/DL (ref 11.1–15.9)
IMM GRANULOCYTES # BLD AUTO: 0 X10E3/UL (ref 0–0.1)
IMM GRANULOCYTES NFR BLD AUTO: 0 %
IRON SERPL-MCNC: 107 UG/DL (ref 27–159)
LYMPHOCYTES # BLD AUTO: 2.3 X10E3/UL (ref 0.7–3.1)
LYMPHOCYTES NFR BLD AUTO: 36 %
MCH RBC QN AUTO: 32.1 PG (ref 26.6–33)
MCHC RBC AUTO-ENTMCNC: 34.1 G/DL (ref 31.5–35.7)
MCV RBC AUTO: 94 FL (ref 79–97)
METHYLMALONATE SERPL-SCNC: 210 NMOL/L (ref 0–378)
MONOCYTES # BLD AUTO: 0.6 X10E3/UL (ref 0.1–0.9)
MONOCYTES NFR BLD AUTO: 9 %
NEUTROPHILS # BLD AUTO: 3.4 X10E3/UL (ref 1.4–7)
NEUTROPHILS NFR BLD AUTO: 53 %
PLATELET # BLD AUTO: 290 X10E3/UL (ref 150–450)
POTASSIUM SERPL-SCNC: 4.5 MMOL/L (ref 3.5–5.2)
PREALB SERPL-MCNC: 27 MG/DL (ref 14–35)
PROT SERPL-MCNC: 7.1 G/DL (ref 6–8.5)
RBC # BLD AUTO: 4.14 X10E6/UL (ref 3.77–5.28)
SODIUM SERPL-SCNC: 138 MMOL/L (ref 134–144)
VIT B1 BLD-SCNC: 162.5 NMOL/L (ref 66.5–200)
WBC # BLD AUTO: 6.4 X10E3/UL (ref 3.4–10.8)

## 2022-01-04 ENCOUNTER — OFFICE VISIT (OUTPATIENT)
Dept: BARIATRICS/WEIGHT MGMT | Facility: CLINIC | Age: 42
End: 2022-01-04

## 2022-01-04 VITALS
HEART RATE: 76 BPM | SYSTOLIC BLOOD PRESSURE: 118 MMHG | DIASTOLIC BLOOD PRESSURE: 66 MMHG | WEIGHT: 169.5 LBS | BODY MASS INDEX: 25.69 KG/M2 | OXYGEN SATURATION: 98 % | HEIGHT: 68 IN | TEMPERATURE: 96.4 F | RESPIRATION RATE: 18 BRPM

## 2022-01-04 DIAGNOSIS — E78.2 MIXED HYPERLIPIDEMIA: ICD-10-CM

## 2022-01-04 DIAGNOSIS — E66.3 OVERWEIGHT (BMI 25.0-29.9): Primary | ICD-10-CM

## 2022-01-04 PROCEDURE — 99213 OFFICE O/P EST LOW 20 MIN: CPT | Performed by: NURSE PRACTITIONER

## 2022-01-04 RX ORDER — DIETHYLPROPION HYDROCHLORIDE 25 MG/1
TABLET ORAL
Qty: 84 EACH | Refills: 0 | Status: SHIPPED | OUTPATIENT
Start: 2022-01-04 | End: 2022-02-01

## 2022-01-04 NOTE — PROGRESS NOTES
Office Note      Date: 2022  Patient Name: Mariel Fox  MRN: [id number]  : 1980     Chief Complaint  Obesity and Follow-up  Subjective  Subjective           Mariel Fox presents to [facility]NAGEMENT for obesity management. LSG 2020 with Dr. Ojeda. Presurgery weight: 249 pounds, goal 165lb. Having abdominoplasty and brachioplasty  in [place]. Was advised to be off of all medications for 2 weeks.   Patient is satisfied with weight loss progress, she is worried she is going to gain it back. Appetite is moderately controlled. Reports no side effects of prescribed medications today. The patient is taking multivitamin and is taking fish oil.  The patient is not using a food journal. She completed labwork for surgery that we reviewed today.   The patient is exercising with a FITT score of:    Frequency Intensity Time Strength Training   []   0, none []   0 []   0 []   0   []   1 (1-2x/week) []   1 (light) []   1 (<10 min) []   1 (1x/week)   [x]   2 (3-5x/week) [x]   2 (moderate) []   2 (10-20 min) [x]   2 (2x/week)   []   3 (daily) []   3 (moderately hard)  []   4 (very hard) [x]   3 (20-30 min)  []   4 (>30 min) []   3 (3-4x/week)       Start weight: 186 pounds.    Total Loss/%Loss of BBW: -8.87%/-16.5lb  Change in weight since last visit: -8.5    Review of Systems   Constitutional: Negative for fatigue.   Eyes: Negative for visual disturbance.   Cardiovascular: Negative for chest pain and palpitations.   Gastrointestinal: Negative for abdominal pain, constipation, diarrhea, nausea and GERD.   Neurological: Negative for dizziness, tremors, light-headedness, headache and memory problem.        Parasthesias negative   Psychiatric/Behavioral: Negative for sleep disturbance and depressed mood. The patient is not nervous/anxious.        Objective     Body mass index is 25.77 kg/m².   Body composition analysis completed and showed:   %body fat:  "36.2  Measurements (in inches)  Waist: 30  Vital Signs:   /66 (BP Location: Left arm, Patient Position: Sitting, Cuff Size: Adult)   Pulse 76   Temp 96.4 °F (35.8 °C) (Temporal)   Resp 18   Ht 172.7 cm (68\")   Wt 76.9 kg (169 lb 8 oz)   SpO2 98%   BMI 25.77 kg/m²     Physical Exam   General appears stated age and normal appearance   HEENT PERRLA, EOM intact and conjunctivae normal   Chest/lungs Normal rate, Regular rhythm and Breathing is unlabored   Extremities without edema   Neuro Good historian and No focal deficit   Skin Warm, dry, intact   Psych normal behavior, normal thought content and normal concentration     Result Review :   The following data was reviewed by: ROCCO Figueroa on 01/04/2022:  CMP    CMP 12/27/21   Glucose 83   BUN 17   Creatinine 0.80   eGFR Non  Am 93   eGFR African Am 107   Sodium 138   Potassium 4.5   Chloride 100   Calcium 9.2   Total Protein 7.1   Albumin 4.3   Globulin 2.8   Total Bilirubin 1.0   Alkaline Phosphatase 76   AST (SGOT) 19   ALT (SGPT) 19      Comments are available for some flowsheets but are not being displayed.           CBC    CBC 5/18/21 12/27/21   WBC 7.64 6.4   RBC 4.23 4.14   Hemoglobin 13.0 13.3   Hematocrit 41.3 39.0   MCV 97.6 94   MCH 30.7 32.1   MCHC 31.5 34.1   RDW 12.9 12.1   Platelets 276 290                    Assessment / Plan        Diagnoses and all orders for this visit:    1. Overweight (BMI 25.0-29.9) (Primary)  Assessment & Plan:  Patient's (Body mass index is 25.77 kg/m².) indicates that they are overweight with health conditions that include dyslipidemias . Weight is improving with treatment. BMI is is above average; BMI management plan is completed. We discussed low calorie, low carb based diet program, portion control, increasing exercise and pharmacologic options including diethylpropion and saxenda.     I have instructed the patient to continue with pursuit of medical weight loss as a part of this program. Patient " does meet criteria for use of anorectics at this time as BMI >25 with , hyperlipidemia and is not at treatment goal.     Continue nutritional focus and work towards new exercise FITT goal of:   The current plan for this month includes: continue current exercise efforts and continue to work on lifestyle behavioral changes. Consider 2-3 protein shakes a day during recovery. Having abd/brachioplasty in 2 weeks. OK to resume meds 24 hours after surgery.       Orders:  -     Diethylpropion HCl 25 MG tablet; Take one tablet at 7am, one tablet at 11am, and one tablet at 3pm.  Dispense: 84 each; Refill: 0    2. Mixed hyperlipidemia  Assessment & Plan:  Weight reduction.          Follow Up   Return in about 4 weeks (around 2/1/2022) for Next scheduled follow up.  Patient was given instructions and counseling regarding her condition or for health maintenance advice. Please see specific information pulled into the AVS if appropriate.     Racheal Gonsalez, APRN  01/04/2022

## 2022-01-04 NOTE — ASSESSMENT & PLAN NOTE
Patient's (Body mass index is 25.77 kg/m².) indicates that they are overweight with health conditions that include dyslipidemias . Weight is improving with treatment. BMI is is above average; BMI management plan is completed. We discussed low calorie, low carb based diet program, portion control, increasing exercise and pharmacologic options including diethylpropion and saxenda.     I have instructed the patient to continue with pursuit of medical weight loss as a part of this program. Patient does meet criteria for use of anorectics at this time as BMI >25 with , hyperlipidemia and is not at treatment goal.     Continue nutritional focus and work towards new exercise FITT goal of:   The current plan for this month includes: continue current exercise efforts and continue to work on lifestyle behavioral changes. Consider 2-3 protein shakes a day during recovery. Having abd/brachioplasty in 2 weeks. OK to resume meds 24 hours after surgery.

## 2022-01-25 ENCOUNTER — LAB REQUISITION (OUTPATIENT)
Dept: LAB | Facility: HOSPITAL | Age: 42
End: 2022-01-25

## 2022-01-25 DIAGNOSIS — Z00.00 ENCOUNTER FOR GENERAL ADULT MEDICAL EXAMINATION WITHOUT ABNORMAL FINDINGS: ICD-10-CM

## 2022-01-25 LAB — SARS-COV-2 ORF1AB RESP QL NAA+PROBE: NOT DETECTED

## 2022-01-25 PROCEDURE — U0004 COV-19 TEST NON-CDC HGH THRU: HCPCS | Performed by: PLASTIC SURGERY

## 2022-02-01 DIAGNOSIS — E66.3 OVERWEIGHT (BMI 25.0-29.9): ICD-10-CM

## 2022-02-01 RX ORDER — DIETHYLPROPION HYDROCHLORIDE 25 MG/1
TABLET ORAL
Qty: 21 EACH | Refills: 0 | Status: SHIPPED | OUTPATIENT
Start: 2022-02-01 | End: 2022-04-05

## 2022-02-01 RX ORDER — LIRAGLUTIDE 6 MG/ML
INJECTION, SOLUTION SUBCUTANEOUS
Qty: 15 ML | Refills: 0 | Status: SHIPPED | OUTPATIENT
Start: 2022-02-01 | End: 2022-03-10

## 2022-02-16 ENCOUNTER — TELEMEDICINE (OUTPATIENT)
Dept: BARIATRICS/WEIGHT MGMT | Facility: CLINIC | Age: 42
End: 2022-02-16

## 2022-02-16 VITALS — HEIGHT: 68 IN | BODY MASS INDEX: 24.93 KG/M2 | WEIGHT: 164.5 LBS | HEART RATE: 60 BPM

## 2022-02-16 DIAGNOSIS — E28.2 PCOS (POLYCYSTIC OVARIAN SYNDROME): ICD-10-CM

## 2022-02-16 DIAGNOSIS — E66.3 OVERWEIGHT (BMI 25.0-29.9): Primary | ICD-10-CM

## 2022-02-16 PROCEDURE — 99213 OFFICE O/P EST LOW 20 MIN: CPT | Performed by: NURSE PRACTITIONER

## 2022-02-16 RX ORDER — ONDANSETRON HYDROCHLORIDE 8 MG/1
8 TABLET, FILM COATED ORAL EVERY 8 HOURS PRN
COMMUNITY
Start: 2022-01-14 | End: 2022-11-07

## 2022-02-16 NOTE — ASSESSMENT & PLAN NOTE
Patient's (Body mass index is 25.01 kg/m².) indicates that they are overweight with health conditions that include dyslipidemias, GERD and PCOS . Weight is improving with treatment. BMI is is above average; BMI management plan is completed. We discussed low calorie, low carb based diet program, portion control, increasing exercise and pharmacologic options including saxenda and diethylpropion PRN.     I have instructed the patient to continue with pursuit of medical weight loss as a part of this program. Patient does meet criteria for use of anorectics at this time as BMI >25 with , hyperlipidemia and history of obesity.     Continue nutritional focus and work towards new exercise FITT goal of: 2-2-4-2.  The current plan for this month includes: continue nutrition focus and increase exercise as able.  Start transition to weight loss maintenance. Established Control weight: 165lb, action weight: 168lb, and panic weight: 172lb and discussed what to do at these different weights. Continue saxenda, PA is up for renewal. Continue diethylpropion PRN.

## 2022-02-16 NOTE — PROGRESS NOTES
Office Note      Date: 2022  Patient Name: Mariel Fox  MRN: [id number]  : 1980    Patient presents during the COVID-19 pandemic/federally declared state of public health emergency.  This service was conducted via [employer].  Patient is located at her home.  Provider is located at her primary office location. The use of a video visit has been reviewed with the patient and verbal informed consent has been obtained.  Subjective  Subjective     Chief Complaint  Obesity Management follow-up    Subjective          Mariel Fox presents to [facility] WEIGHT MANAGEMENT via telehealth for obesity management. LSG 2020 with Dr. Ojeda. Presurgery weight: 249 pounds, goal 165lb.    Patient is satisfied with weight loss progress. Appetite is well controlled. Reports no side effects of prescribed medications today. Taking stool softener every day for constipation, doesn't think diethylpropion is contributing. Had abdominoplasty/brachioplasty 3 weeks ago. Spiked a high fever 1 week after surgery, started antibiotic, had follow up with surgery office and is putting silvadeen on twice a day. Still has abdominal drain tube. Started back at work 3 hours a week, still exhausted but slowly doing better. Started back saxenda and one diethylpropion tablet daily. Could tell a major difference when she was off of those. She hit her goal weight today, would like to continue saxenda and diethylpropion PRN.     Review of Systems   Constitutional: Positive for fatigue.   Eyes: Negative for visual disturbance.   Cardiovascular: Negative for chest pain and palpitations.   Gastrointestinal: Positive for constipation. Negative for abdominal pain, diarrhea, nausea, GERD and indigestion.   Neurological: Negative for dizziness, tremors, light-headedness, headache and memory problem.        Parasthesias negative   Psychiatric/Behavioral: Negative for sleep disturbance and depressed mood. The patient is not  "nervous/anxious.        Objective   Body mass index is 25.01 kg/m².  Start weight (MWM): 186 pounds.    Total weight loss: -21.5 pounds/11.5%  Change in weight since last visit: -5lb    Measurements (in inches)  Waist: 30.5\"  Pulse 60   Ht 172.7 cm (68\")   Wt 74.6 kg (164 lb 8 oz)   BMI 25.01 kg/m²       Result Review :                 Assessment and Plan    Diagnoses and all orders for this visit:    1. Overweight (BMI 25.0-29.9) (Primary)  Assessment & Plan:  Patient's (Body mass index is 25.01 kg/m².) indicates that they are overweight with health conditions that include dyslipidemias, GERD and PCOS . Weight is improving with treatment. BMI is is above average; BMI management plan is completed. We discussed low calorie, low carb based diet program, portion control, increasing exercise and pharmacologic options including saxenda and diethylpropion PRN.     I have instructed the patient to continue with pursuit of medical weight loss as a part of this program. Patient does meet criteria for use of anorectics at this time as BMI >25 with , hyperlipidemia and history of obesity.     Continue nutritional focus and work towards new exercise FITT goal of: 2-2-4-2.  The current plan for this month includes: continue nutrition focus and increase exercise as able.  Start transition to weight loss maintenance. Established Control weight: 165lb, action weight: 168lb, and panic weight: 172lb and discussed what to do at these different weights. Continue saxenda, PA is up for renewal. Continue diethylpropion PRN.           2. PCOS (polycystic ovarian syndrome)  Assessment & Plan:  Low carb diet, regular physical activity, and weight reduction of 10-15% accomplished. Continue liraglutide.              Follow Up   Return in about 4 weeks (around 3/16/2022) for Next scheduled follow up.  Patient was given instructions and counseling regarding her condition or for health maintenance advice. Please see specific information pulled " into the AVS if appropriate.     ROCCO Calero

## 2022-02-16 NOTE — ASSESSMENT & PLAN NOTE
Low carb diet, regular physical activity, and weight reduction of 10-15% accomplished. Continue liraglutide.

## 2022-03-10 DIAGNOSIS — E66.3 OVERWEIGHT (BMI 25.0-29.9): ICD-10-CM

## 2022-03-10 RX ORDER — LIRAGLUTIDE 6 MG/ML
INJECTION, SOLUTION SUBCUTANEOUS
Qty: 15 ML | Refills: 0 | Status: SHIPPED | OUTPATIENT
Start: 2022-03-10 | End: 2022-04-05

## 2022-04-02 DIAGNOSIS — E66.3 OVERWEIGHT (BMI 25.0-29.9): ICD-10-CM

## 2022-04-05 RX ORDER — LIRAGLUTIDE 6 MG/ML
INJECTION, SOLUTION SUBCUTANEOUS
Qty: 15 ML | Refills: 0 | Status: SHIPPED | OUTPATIENT
Start: 2022-04-05 | End: 2022-06-10

## 2022-04-05 RX ORDER — DIETHYLPROPION HYDROCHLORIDE 25 MG/1
TABLET ORAL
Qty: 27 EACH | Refills: 0 | Status: SHIPPED | OUTPATIENT
Start: 2022-04-05 | End: 2022-04-14 | Stop reason: SDUPTHER

## 2022-04-14 ENCOUNTER — TELEMEDICINE (OUTPATIENT)
Dept: BARIATRICS/WEIGHT MGMT | Facility: CLINIC | Age: 42
End: 2022-04-14

## 2022-04-14 VITALS — HEIGHT: 68 IN | WEIGHT: 162.4 LBS | BODY MASS INDEX: 24.61 KG/M2 | HEART RATE: 68 BPM

## 2022-04-14 DIAGNOSIS — E28.2 PCOS (POLYCYSTIC OVARIAN SYNDROME): ICD-10-CM

## 2022-04-14 DIAGNOSIS — E66.3 OVERWEIGHT (BMI 25.0-29.9): Primary | ICD-10-CM

## 2022-04-14 PROCEDURE — 99214 OFFICE O/P EST MOD 30 MIN: CPT | Performed by: NURSE PRACTITIONER

## 2022-04-14 RX ORDER — DIETHYLPROPION HYDROCHLORIDE 25 MG/1
TABLET ORAL
Qty: 60 EACH | Refills: 0 | Status: SHIPPED | OUTPATIENT
Start: 2022-04-14 | End: 2022-06-16

## 2022-04-14 NOTE — ASSESSMENT & PLAN NOTE
-EKG pending per request of Ophthalmology will f/u results   -medical history to be faxed to Ophthalmology office   -BMP done in 6/2020 largely unremarkable      Patient's (Body mass index is 24.69 kg/m².) indicates that they are overweight with health conditions that include dyslipidemias and PCOS, depression . Weight is improving with treatment. BMI is is above average; BMI management plan is completed. We discussed low calorie, low carb based diet program, portion control, increasing exercise, management of depression/anxiety/stress to control compensatory eating, pharmacologic options including diethylpropion and saxenda and an kesha-based approach such as [employer] Pal or Lose It.    I have instructed the patient to continue with pursuit of medical weight loss as a part of this program. Patient does meet criteria for use of anorectics at this time as body fat percentage >30% and patient is tapering off of anorectics.     Continue nutritional focus and work towards new exercise FITT goal of: 2-2-4-2.   The current plan for this month includes: continue current exercise efforts and continue nutrition focus. Continue taper of diethylpropion, tolerating well. Reviewed CAP weights, patient is to check weight regularly and make sooner follow up for weight 170lb or greater. Continue saxenda long term as indicated.     Continue sympathomimetic (medication refilled).  This patient 1) has no evidence of serious cardiovascular disease; 2) does not have serious psychiatric disease or a history of substance abuse; 3) has been informed about weight loss medications that are FDA approved for long-term use and told that these have been all documented to be safe and effective whereas phentermine has not; 4) does not demonstrate a clinically significant increase in pulse or blood pressure when taking phentermine; and 5) demonstrate significant weight loss while using the medication.  Patient understands that all antiobesity medications are contraindicated in pregnancy.  Patient denies a history of glaucoma.  Patient understands that long-term use of phentermine is considered off label use  "of this medication, however, that the endocrine Society and recent research supports that long-term use of phentermine does not appear to have detrimental health effects when used and the appropriate patient.  In addition, a 2019 study published in an obesity journal on 13,972 patient's concluded that \"recommendations to limit phentermine to less than 3 months do not align with current concept of pharmacologic treatment of obesity\", and that \"long-term phentermine users experience greater weight loss without apparent increases in cardiovascular risk\".    We reviewed potential side effects including insomnia, dry mouth, increased heart rate and blood pressure, increased anxiety.  We reviewed reducing caffeine consumption while taking phentermine.  especially if the patient is experience side effects.  The potential risk and benefits of phentermine have been reviewed with the patient, and alternative treatment options were discussed.  All questions were answered, and the patient wishes to move forward with this medication.           "

## 2022-04-14 NOTE — PROGRESS NOTES
"     Office Note      Date: 2022  Patient Name: Mariel Fox  MRN: [id number]  : 1980    Patient presents during the COVID-19 pandemic/federally declared [facility] of public health emergency.  This service was conducted via Zoom.  Patient is located in [place].  Provider is located at her primary office location. The use of a video visit has been reviewed with the patient and verbal informed consent has been obtained.  Subjective  Subjective     Chief Complaint  Obesity Management follow-up    Subjective          Mariel Fox presents to [facility]IGHT [facility]NAG[facility] via telehealth for obesity management.     Patient is satisfied with weight loss progress. Appetite is well controlled.Having another surgery next week, breast lift, just taking diethhylpropion at 1pm because she knows she'll have to stop it before surgery. Had to do augmentin and prednisone for ear infection. Reports no side effects of prescribed medications today. No food journal, focusing on meal planning. Using protein shakes and bars on occasion. Eating protein-based foods like hard boiled eggs.   The patient is exercising, doing the gym 3-4 days a week and rowing or walk/jogging whenever she can.    Review of Systems   Constitutional: Positive for fatigue (normal).   Eyes: Negative for visual disturbance.   Cardiovascular: Negative for chest pain and palpitations.   Gastrointestinal: Positive for GERD. Negative for abdominal pain, constipation, diarrhea and nausea.   Neurological: Negative for dizziness, tremors, light-headedness, headache and memory problem.        Parasthesias negative   Psychiatric/Behavioral: Negative for sleep disturbance and depressed mood. The patient is not nervous/anxious.      Objective   Body mass index is 24.69 kg/m².  MWM Start weight: 186 pounds.    Total weight loss: -24 pounds/-13%  Change in weight since last visit: -2lb  Measurements (in inches)  Waist: 30\"  Pulse 68   Ht " "172.7 cm (68\")   Wt 73.7 kg (162 lb 6.4 oz)   BMI 24.69 kg/m²       Result Review :                 Assessment and Plan    Diagnoses and all orders for this visit:    1. Overweight (BMI 25.0-29.9) (Primary)  Assessment & Plan:  Patient's (Body mass index is 24.69 kg/m².) indicates that they are overweight with health conditions that include dyslipidemias and PCOS, depression . Weight is improving with treatment. BMI is is above average; BMI management plan is completed. We discussed low calorie, low carb based diet program, portion control, increasing exercise, management of depression/anxiety/stress to control compensatory eating, pharmacologic options including diethylpropion and saxenda and an kesha-based approach such as [employer] Pal or Lose It.    I have instructed the patient to continue with pursuit of medical weight loss as a part of this program. Patient does meet criteria for use of anorectics at this time as body fat percentage >30% and patient is tapering off of anorectics.     Continue nutritional focus and work towards new exercise FITT goal of: 2-2-4-2.   The current plan for this month includes: continue current exercise efforts and continue nutrition focus. Continue taper of diethylpropion, tolerating well. Reviewed CAP weights, patient is to check weight regularly and make sooner follow up for weight 170lb or greater. Continue saxenda long term as indicated.     Continue sympathomimetic (medication refilled).  This patient 1) has no evidence of serious cardiovascular disease; 2) does not have serious psychiatric disease or a history of substance abuse; 3) has been informed about weight loss medications that are FDA approved for long-term use and told that these have been all documented to be safe and effective whereas phentermine has not; 4) does not demonstrate a clinically significant increase in pulse or blood pressure when taking phentermine; and 5) demonstrate significant weight loss while " "using the medication.  Patient understands that all antiobesity medications are contraindicated in pregnancy.  Patient denies a history of glaucoma.  Patient understands that long-term use of phentermine is considered off label use of this medication, however, that the endocrine Society and recent research supports that long-term use of phentermine does not appear to have detrimental health effects when used and the appropriate patient.  In addition, a 2019 study published in an obesity journal on [id number] patient's concluded that \"recommendations to limit phentermine to less than 3 months do not align with current concept of pharmacologic treatment of obesity\", and that \"long-term phentermine users experience greater weight loss without apparent increases in cardiovascular risk\".    We reviewed potential side effects including insomnia, dry mouth, increased heart rate and blood pressure, increased anxiety.  We reviewed reducing caffeine consumption while taking phentermine.  especially if the patient is experience side effects.  The potential risk and benefits of phentermine have been reviewed with the patient, and alternative treatment options were discussed.  All questions were answered, and the patient wishes to move forward with this medication.             Orders:  -     Diethylpropion HCl 25 MG tablet; Take one tablet by mouth at 11am and one tablet by mouth at 2 pm.  Dispense: 60 each; Refill: 0    2. PCOS (polycystic ovarian syndrome)  Assessment & Plan:  Low carb diet, regular physical activity, and weight reduction.     I spent >30 minutes caring for Mariel on this date of service. This time includes time spent by me in the following activities:obtaining and/or reviewing a separately obtained history, performing a medically appropriate examination and/or evaluation , counseling and educating the patient/family/caregiver, ordering medications, tests, or procedures and documenting information in the medical " record  Follow Up   Return in about 3 months (around 7/14/2022) for Next scheduled follow up.  Patient was given instructions and counseling regarding her condition or for health maintenance advice. Please see specific information pulled into the AVS if appropriate.     ROCCO Calero

## 2022-06-08 DIAGNOSIS — E66.3 OVERWEIGHT (BMI 25.0-29.9): ICD-10-CM

## 2022-06-10 RX ORDER — LIRAGLUTIDE 6 MG/ML
INJECTION, SOLUTION SUBCUTANEOUS
Qty: 15 ML | Refills: 0 | Status: SHIPPED | OUTPATIENT
Start: 2022-06-10 | End: 2022-07-12 | Stop reason: SDUPTHER

## 2022-06-16 DIAGNOSIS — E66.3 OVERWEIGHT (BMI 25.0-29.9): ICD-10-CM

## 2022-06-16 RX ORDER — DIETHYLPROPION HYDROCHLORIDE 25 MG/1
TABLET ORAL
Qty: 60 EACH | Refills: 0 | Status: SHIPPED | OUTPATIENT
Start: 2022-06-16 | End: 2022-10-09 | Stop reason: SDUPTHER

## 2022-06-16 RX ORDER — DIETHYLPROPION HYDROCHLORIDE 25 MG/1
TABLET ORAL
Refills: 0 | OUTPATIENT
Start: 2022-06-16

## 2022-07-12 ENCOUNTER — TELEMEDICINE (OUTPATIENT)
Dept: BARIATRICS/WEIGHT MGMT | Facility: CLINIC | Age: 42
End: 2022-07-12

## 2022-07-12 VITALS — WEIGHT: 160.6 LBS | HEIGHT: 68 IN | HEART RATE: 68 BPM | BODY MASS INDEX: 24.34 KG/M2

## 2022-07-12 DIAGNOSIS — E66.3 OVERWEIGHT (BMI 25.0-29.9): Primary | ICD-10-CM

## 2022-07-12 DIAGNOSIS — E78.2 MIXED HYPERLIPIDEMIA: ICD-10-CM

## 2022-07-12 PROCEDURE — 99214 OFFICE O/P EST MOD 30 MIN: CPT | Performed by: NURSE PRACTITIONER

## 2022-07-12 RX ORDER — LIRAGLUTIDE 6 MG/ML
3 INJECTION, SOLUTION SUBCUTANEOUS DAILY
Qty: 15 ML | Refills: 0 | Status: SHIPPED | OUTPATIENT
Start: 2022-07-12 | End: 2022-10-09 | Stop reason: SDUPTHER

## 2022-07-12 NOTE — ASSESSMENT & PLAN NOTE
Patient's (Body mass index is 24.42 kg/m².) indicates that they are overweight with health conditions that include dyslipidemias and PCOS, arthritis . Weight is improving with treatment. BMI is is above average; BMI management plan is completed. We discussed low calorie, low carb based diet program, portion control, increasing exercise, management of depression/anxiety/stress to control compensatory eating, pharmacologic options including diethylpropion and saxenda and an kesha-based approach such as [employer] Pal or Lose It.    I have instructed the patient to continue with pursuit of medical weight loss as a part of this program. Patient does meet criteria for use of anorectics at this time as body fat percentage >30% and saxenda is approved for prevention of weight regain. .     Continue nutritional focus and work towards new exercise FITT goal of: 2-2-4-2.  The current plan for this month includes: adjust exercise as discussed and increase water until urine is pale yellow. Will need body composition analysis to continue diethylpropion since BMI is <25 (%fat is likely still >30). Using partial dose. Continue saxenda at 0.6mg daily as tolerated.     Continue sympathomimetic (medication refilled).  This patient 1) has no evidence of serious cardiovascular disease; 2) does not have serious psychiatric disease or a history of substance abuse; 3) has been informed about weight loss medications that are FDA approved for long-term use and told that these have been all documented to be safe and effective whereas phentermine has not; 4) does not demonstrate a clinically significant increase in pulse or blood pressure when taking phentermine; and 5) demonstrate significant weight loss while using the medication.  Patient understands that all antiobesity medications are contraindicated in pregnancy.  Patient denies a history of glaucoma.  Patient understands that long-term use of phentermine is considered off label use of  "this medication, however, that the endocrine Society and recent research supports that long-term use of phentermine does not appear to have detrimental health effects when used and the appropriate patient.  In addition, a 2019 study published in an obesity journal on [id number] patient's concluded that \"recommendations to limit phentermine to less than 3 months do not align with current concept of pharmacologic treatment of obesity\", and that \"long-term phentermine users experience greater weight loss without apparent increases in cardiovascular risk\".    We reviewed potential side effects including insomnia, dry mouth, increased heart rate and blood pressure, increased anxiety.  We reviewed reducing caffeine consumption while taking phentermine.  especially if the patient is experience side effects.  The potential risk and benefits of phentermine have been reviewed with the patient, and alternative treatment options were discussed.  All questions were answered, and the patient wishes to move forward with this medication.       "

## 2022-07-12 NOTE — ASSESSMENT & PLAN NOTE
Lipid abnormalities are stable.  Maintain healthy weight and low saturated fat diet  Lipids will be reassessed with PCP.

## 2022-07-12 NOTE — PROGRESS NOTES
Office Note      Date: 2022  Patient Name: Mariel Fox  MRN: [id number]  : 1980    Patient presents during the COVID-19 pandemic/federally declared [facility] of public health emergency.  This service was conducted via Zoom.  Patient is located at her home address.  Provider is located at her primary office location. The use of a video visit has been reviewed with the patient and verbal informed consent has been obtained.  Subjective  Subjective     Chief Complaint  Obesity Management follow-up    Subjective          Mariel Fox presents to [facility]NAGEMENT via telehealth for obesity management.     Patient is satisfied with weight loss progress. Appetite is mostly well controlled, Still fluctuates a lot with her period. 3-4 days out of the month craving sweets or chocolate. Reports no side effects of prescribed medications today. Had her final surgery- breast lift and tummy tuck revision- got infection 3 weeks out. Had to keep it packed. Hasn't been able to exercise at all. Got COVID-19 at [employer]- just started back at work today. Plans to get back into exercise now that she's feeling better. Only using 0.6mg daily of saxenda and 1/2 tablet of diethylpropion daily.   No food journal at this time.   Dietary intake:  B: built bar or aragon and eggs, protein shake once a week  L: chicken salad and 1/2 quest resee cup, still keeping carbs low. Occasionally has pizza (2 pieces)  D: eating out 3-4 nights a week (no fries, just chicken and rice)  Water: could do better    Review of Systems   Constitutional: Positive for fatigue (recovering from covid).   Eyes: Negative for visual disturbance.   Cardiovascular: Negative for chest pain, palpitations and leg swelling.   Gastrointestinal: Negative for abdominal pain, constipation, diarrhea, nausea and GERD.   Endocrine: Negative for polydipsia, polyphagia and polyuria.   Musculoskeletal: Negative for arthralgias and myalgias.  "  Neurological: Negative for dizziness, tremors, light-headedness, headache and memory problem.        Parasthesias negative   Psychiatric/Behavioral: Negative for sleep disturbance and depressed mood. The patient is not nervous/anxious.        Objective   Body mass index is 24.42 kg/m².  Start weight MWM:186 pounds.    Total weight loss: -26 pounds/-14%  Change in weight since last visit: -2lb    Measurements (in inches)  Waist: 30\"  Pulse 68   Ht 172.7 cm (68\")   Wt 72.8 kg (160 lb 9.6 oz)   BMI 24.42 kg/m²       Result Review :                 Assessment and Plan    Diagnoses and all orders for this visit:    1. Overweight (BMI 25.0-29.9) (Primary)  Assessment & Plan:  Patient's (Body mass index is 24.42 kg/m².) indicates that they are overweight with health conditions that include dyslipidemias and PCOS, arthritis . Weight is improving with treatment. BMI is is above average; BMI management plan is completed. We discussed low calorie, low carb based diet program, portion control, increasing exercise, management of depression/anxiety/stress to control compensatory eating, pharmacologic options including diethylpropion and saxenda and an kesha-based approach such as [employer] Pal or Lose It.    I have instructed the patient to continue with pursuit of medical weight loss as a part of this program. Patient does meet criteria for use of anorectics at this time as body fat percentage >30% and saxenda is approved for prevention of weight regain. .     Continue nutritional focus and work towards new exercise FITT goal of: 2-2-4-2.  The current plan for this month includes: adjust exercise as discussed and increase water until urine is pale yellow. Will need body composition analysis to continue diethylpropion since BMI is <25 (%fat is likely still >30). Using partial dose. Continue saxenda at 0.6mg daily as tolerated.     Continue sympathomimetic (medication refilled).  This patient 1) has no evidence of serious " "cardiovascular disease; 2) does not have serious psychiatric disease or a history of substance abuse; 3) has been informed about weight loss medications that are FDA approved for long-term use and told that these have been all documented to be safe and effective whereas phentermine has not; 4) does not demonstrate a clinically significant increase in pulse or blood pressure when taking phentermine; and 5) demonstrate significant weight loss while using the medication.  Patient understands that all antiobesity medications are contraindicated in pregnancy.  Patient denies a history of glaucoma.  Patient understands that long-term use of phentermine is considered off label use of this medication, however, that the endocrine Society and recent research supports that long-term use of phentermine does not appear to have detrimental health effects when used and the appropriate patient.  In addition, a 2019 study published in an obesity journal on [id number] patient's concluded that \"recommendations to limit phentermine to less than 3 months do not align with current concept of pharmacologic treatment of obesity\", and that \"long-term phentermine users experience greater weight loss without apparent increases in cardiovascular risk\".    We reviewed potential side effects including insomnia, dry mouth, increased heart rate and blood pressure, increased anxiety.  We reviewed reducing caffeine consumption while taking phentermine.  especially if the patient is experience side effects.  The potential risk and benefits of phentermine have been reviewed with the patient, and alternative treatment options were discussed.  All questions were answered, and the patient wishes to move forward with this medication.         Orders:  -     Liraglutide (Saxenda) 18 MG/3ML injection pen; Inject 3 mg under the skin into the appropriate area as directed Daily.  Dispense: 15 mL; Refill: 0    2. Mixed hyperlipidemia  Assessment & Plan:  Lipid " abnormalities are stable.  Maintain healthy weight and low saturated fat diet  Lipids will be reassessed with PCP.        I spent 35 minutes caring for Mariel on this date of service. This time includes time spent by me in the following activities:preparing for the visit, obtaining and/or reviewing a separately obtained history, performing a medically appropriate examination and/or evaluation , counseling and educating the patient/family/caregiver, ordering medications, tests, or procedures and documenting information in the medical record  Follow Up   Return in about 3 months (around 10/12/2022) for Next scheduled follow up.  Patient was given instructions and counseling regarding her condition or for health maintenance advice. Please see specific information pulled into the AVS if appropriate.     Racheal Gonsalez APRN

## 2022-10-09 DIAGNOSIS — E66.3 OVERWEIGHT (BMI 25.0-29.9): ICD-10-CM

## 2022-10-10 RX ORDER — DIETHYLPROPION HYDROCHLORIDE 25 MG/1
TABLET ORAL
Qty: 60 EACH | Refills: 0 | Status: SHIPPED | OUTPATIENT
Start: 2022-10-10 | End: 2022-11-07 | Stop reason: SDUPTHER

## 2022-10-10 RX ORDER — LIRAGLUTIDE 6 MG/ML
3 INJECTION, SOLUTION SUBCUTANEOUS DAILY
Qty: 15 ML | Refills: 0 | Status: SHIPPED | OUTPATIENT
Start: 2022-10-10 | End: 2022-11-07 | Stop reason: SDUPTHER

## 2022-10-10 NOTE — TELEPHONE ENCOUNTER
Rx Refill Note  Requested Prescriptions     Pending Prescriptions Disp Refills   • Diethylpropion HCl 25 MG tablet 60 each 0     Sig: TAKE ONE TABLET BY MOUTH DAILY AT 7am, one tablet AT 11am, AND one tablet AT 3pm   • Liraglutide (Saxenda) 18 MG/3ML injection pen 15 mL 0     Sig: Inject 3 mg under the skin into the appropriate area as directed Daily.      Last office visit with prescribing clinician: 1/4/2022      Next office visit with prescribing clinician: Visit date not found            Preeti Plascencia MA  10/10/22, 11:29 EDT

## 2022-11-07 ENCOUNTER — OFFICE VISIT (OUTPATIENT)
Dept: BARIATRICS/WEIGHT MGMT | Facility: CLINIC | Age: 42
End: 2022-11-07

## 2022-11-07 VITALS
HEIGHT: 68 IN | OXYGEN SATURATION: 100 % | WEIGHT: 170.5 LBS | HEART RATE: 78 BPM | BODY MASS INDEX: 25.84 KG/M2 | DIASTOLIC BLOOD PRESSURE: 76 MMHG | SYSTOLIC BLOOD PRESSURE: 122 MMHG

## 2022-11-07 DIAGNOSIS — E66.3 OVERWEIGHT (BMI 25.0-29.9): Primary | ICD-10-CM

## 2022-11-07 DIAGNOSIS — E28.2 PCOS (POLYCYSTIC OVARIAN SYNDROME): ICD-10-CM

## 2022-11-07 DIAGNOSIS — R11.0 NAUSEA: ICD-10-CM

## 2022-11-07 DIAGNOSIS — E78.2 MIXED HYPERLIPIDEMIA: ICD-10-CM

## 2022-11-07 PROCEDURE — 99214 OFFICE O/P EST MOD 30 MIN: CPT | Performed by: NURSE PRACTITIONER

## 2022-11-07 RX ORDER — LIRAGLUTIDE 6 MG/ML
3 INJECTION, SOLUTION SUBCUTANEOUS DAILY
Qty: 15 ML | Refills: 2 | Status: SHIPPED | OUTPATIENT
Start: 2022-11-07 | End: 2023-01-19 | Stop reason: ALTCHOICE

## 2022-11-07 RX ORDER — ONDANSETRON HYDROCHLORIDE 8 MG/1
8 TABLET, FILM COATED ORAL EVERY 8 HOURS PRN
Qty: 30 TABLET | Refills: 0 | Status: SHIPPED | OUTPATIENT
Start: 2022-11-07 | End: 2023-03-07 | Stop reason: SDUPTHER

## 2022-11-07 RX ORDER — DIETHYLPROPION HYDROCHLORIDE 25 MG/1
TABLET ORAL
Qty: 90 EACH | Refills: 0 | Status: SHIPPED | OUTPATIENT
Start: 2022-11-07 | End: 2022-12-05 | Stop reason: SDUPTHER

## 2022-11-07 RX ORDER — TIZANIDINE 4 MG/1
TABLET ORAL DAILY
COMMUNITY
Start: 2022-10-27 | End: 2023-02-02 | Stop reason: ALTCHOICE

## 2022-11-07 RX ORDER — OSELTAMIVIR PHOSPHATE 75 MG/1
CAPSULE ORAL
COMMUNITY
Start: 2022-10-29 | End: 2022-12-05

## 2022-11-07 NOTE — PROGRESS NOTES
[facility]  [address]  [place] [address]     Office Note      Date: 2022  Patient Name: Mariel Fox  MRN: [id number]  : 1980  Subjective  Subjective     Chief Complaint  Obesity Management follow-up          Mariel Fox presents to [facility] for obesity management.   Patient is unsatisfied with weight loss progress. Knew her weight had increased some but didn't realize it was 10lbs. She's still wearing the same clothes she did last year and thinks they fit the same. Appetite is poorly controlled, meds don't seem to be working well in the afternoon/evenings. She was down to 1 or 1.5 tablets a day of diethylpropion until she ran out a few weeks ago. Recently moved her saxenda to 3:30 PM to see if that would help. Only does 0.6mg of saxenda, higher dose more makes her sick. Has been exercising a lot, thinks she has maybe gaining muscle. Reports no side effects of prescribed medications today. The patient is taking multivitamin and is not taking fish oil.  The patient is not using a food journal.  Not drinking as much water. Still doing low carb for the most part but eating more than she had previously. Restarted food journal in anticipation of this visit and that has been helpful.    B: built bar or protein shake or eggs and aragon  L: limits carbs. Today just had one chicken strip and some cashews. Often forgets to eat lunch.   The patient is exercising with a FITT score of:    Frequency Intensity Time Strength Training   []   0, none []   0 []   0 []   0   []   1 (1-2x/week) []   1 (light) []   1 (<10 min) []   1 (1x/week)   [x]   2 (3-5x/week) [x]   2 (moderate) []   2 (10-20 min) []   2 (2x/week)   []   3 (daily) [x]   3 (moderately hard)  []   4 (very hard) []   3 (20-30 min)  [x]   4 (>30 min) [x]   3 (3-4x/week)     Review of Systems   Constitutional: Negative for appetite change and fatigue.   Eyes: Negative  "for blurred vision, double vision and visual disturbance.   Cardiovascular: Negative for chest pain and palpitations.   Gastrointestinal: Negative for abdominal pain, constipation, diarrhea, nausea, vomiting and GERD.   Endocrine: Negative for polydipsia, polyphagia and polyuria.   Musculoskeletal: Negative for arthralgias, back pain and myalgias.   Neurological: Negative for dizziness, tremors, light-headedness, headache and memory problem.        Parasthesias negative   Psychiatric/Behavioral: Negative for sleep disturbance, depressed mood and stress. The patient is not nervous/anxious.        Objective   Start weight: 186 pounds.    Total Loss lb/%Loss of beginning body weight (BBW): -15.5lb/-8.33%  Change in weight since last visit: +9    Body mass index is 25.92 kg/m².   Body composition analysis completed and showed:   %body fat: 34.7  Measurements (in inches)  Waist: 30    Vital Signs:   /76   Pulse 78   Ht 172.7 cm (68\")   Wt 77.3 kg (170 lb 8 oz)   SpO2 100%   BMI 25.92 kg/m²     Physical Exam   General appears stated age and normal appearance   HEENT PERRLA, EOM intact and conjunctivae normal   Chest/lungs Normal rate, Regular rhythm, Breathing is unlabored and Clear to auscultation bilaterally   Extremities without edema   Neuro Good historian and No focal deficit   Skin Warm, dry, intact   Psych normal behavior, normal thought content and normal concentration     Result Review :                Assessment / Plan        Diagnoses and all orders for this visit:    1. Overweight (BMI 25.0-29.9) (Primary)  Assessment & Plan:  Patient's (Body mass index is 25.92 kg/m².) indicates that they are overweight with health conditions that include dyslipidemias and PCOS . Weight is improving with treatment. BMI is is above average; BMI management plan is completed. We discussed low calorie, low carb based diet program, portion control, increasing exercise, joining a fitness center or start home based " exercise program, management of depression/anxiety/stress to control compensatory eating, pharmacologic options including saxenda and diethylpropion and an kesha-based approach such as [employer] Pal or Lose It.    I have instructed the patient to continue with pursuit of medical weight loss as a part of this program. Patient does meet criteria for use of anorectics at this time as BMI >25 with , hyperlipidemia, is not at treatment goal and saxenda is approved for long term management of obesity. .     Continue nutritional focus and work towards new exercise FITT goal of: 2-2-4-2,   The current plan for this month includes: continue current exercise efforts and weight loss goal 4-6lbs this month. Restart diethylpropion three times a day for 2 weeks then start to gradually taper again. Increase saxenda as tolerated titrating by 1-2 clicks every week. Bring food journal to visit for review next month. Repeat labs (I printed an external order in case she is unable to get in here in December). Will have visit with bariatric team next visit also.        Orders:  -     Hemoglobin A1c; Future  -     TSH; Future  -     Lipid Panel; Future  -     T4, Free; Future  -     Liraglutide (Saxenda) 18 MG/3ML injection pen; Inject 3 mg under the skin into the appropriate area as directed Daily.  Dispense: 15 mL; Refill: 2  -     Diethylpropion HCl 25 MG tablet; TAKE ONE TABLET BY MOUTH DAILY AT 7am, one tablet AT 11am, AND one tablet AT 3pm  Dispense: 90 each; Refill: 0    2. Mixed hyperlipidemia  Assessment & Plan:  Repeat labwork.     Orders:  -     Lipid Panel; Future    3. PCOS (polycystic ovarian syndrome)  Assessment & Plan:  Managed by GYN. Continue with weight loss and saxenda.     Orders:  -     Hemoglobin A1c; Future    4. Nausea  -     ondansetron (ZOFRAN) 8 MG tablet; Take 1 tablet by mouth Every 8 (Eight) Hours As Needed for Nausea.  Dispense: 30 tablet; Refill: 0      I spent 33 minutes caring for Mariel on this date of  service. This time includes time spent by me in the following activities:preparing for the visit, obtaining and/or reviewing a separately obtained history, performing a medically appropriate examination and/or evaluation , counseling and educating the patient/family/caregiver, ordering medications, tests, or procedures and documenting information in the medical record  Follow Up   Return in about 4 weeks (around 12/5/2022) for Next scheduled follow up.  Patient was given instructions and counseling regarding her condition or for health maintenance advice. Please see specific information pulled into the AVS if appropriate.     Racheal Gonsalez, APRN  11/07/2022

## 2022-11-08 NOTE — ASSESSMENT & PLAN NOTE
Patient's (Body mass index is 25.92 kg/m².) indicates that they are overweight with health conditions that include dyslipidemias and PCOS . Weight is improving with treatment. BMI is is above average; BMI management plan is completed. We discussed low calorie, low carb based diet program, portion control, increasing exercise, joining a fitness center or start home based exercise program, management of depression/anxiety/stress to control compensatory eating, pharmacologic options including saxenda and diethylpropion and an kesha-based approach such as [employer] Pal or Lose It.    I have instructed the patient to continue with pursuit of medical weight loss as a part of this program. Patient does meet criteria for use of anorectics at this time as BMI >25 with , hyperlipidemia, is not at treatment goal and saxenda is approved for long term management of obesity. .     Continue nutritional focus and work towards new exercise FITT goal of: 2-2-4-2,   The current plan for this month includes: continue current exercise efforts and weight loss goal 4-6lbs this month. Restart diethylpropion three times a day for 2 weeks then start to gradually taper again. Increase saxenda as tolerated titrating by 1-2 clicks every week. Bring food journal to visit for review next month. Repeat labs (I printed an external order in case she is unable to get in here in December). Will have visit with bariatric team next visit also.

## 2022-12-05 ENCOUNTER — TELEMEDICINE (OUTPATIENT)
Dept: BARIATRICS/WEIGHT MGMT | Facility: CLINIC | Age: 42
End: 2022-12-05

## 2022-12-05 VITALS — WEIGHT: 170.8 LBS | BODY MASS INDEX: 25.88 KG/M2 | HEIGHT: 68 IN

## 2022-12-05 DIAGNOSIS — F32.A DEPRESSION, UNSPECIFIED DEPRESSION TYPE: ICD-10-CM

## 2022-12-05 DIAGNOSIS — E66.3 OVERWEIGHT (BMI 25.0-29.9): Primary | ICD-10-CM

## 2022-12-05 PROCEDURE — 99213 OFFICE O/P EST LOW 20 MIN: CPT | Performed by: NURSE PRACTITIONER

## 2022-12-05 RX ORDER — DIETHYLPROPION HYDROCHLORIDE 25 MG/1
TABLET ORAL
Qty: 84 EACH | Refills: 0 | Status: SHIPPED | OUTPATIENT
Start: 2022-12-05 | End: 2023-01-19 | Stop reason: ALTCHOICE

## 2022-12-05 NOTE — PROGRESS NOTES
Office Note      Date: 2022  Patient Name: Mariel Fox  MRN: [id number]  : 1980    Patient presents during the COVID-19 pandemic/federally declared state of public health emergency.  This service was conducted via [employer].  Patient is located in [place].  Provider is located at her primary office location. The use of a video visit has been reviewed with the patient and verbal informed consent has been obtained.  Subjective  Subjective     Chief Complaint  Obesity Management follow-up    Subjective          Mariel Fox presents to [facility] via telehealth for obesity management.     Patient is satisfied with weight loss progress. Increased saxenda to 0.6+3clicks and moved it to 3pm and it didn't make a difference. Previously got really bad reflux with higher dose.Appetite is moderately controlled. Takes diethylpropion 2-3 days a week. Has only needed zofran once.  Reports no side effects of prescribed medications today. She is weighing once a week. Last Friday she was at 168lb. The patient is exercising but slowed down due to her kids athletic activities. Going 3 days a week to the gym- weight lifting each day and ski machine and bike- has worked up to 10 minutes. She is not keeping a food journal at this time. Depends on whats going on- travels a lot. Only drinks unsweet tea and once a day diet coke or mt dew and coffee. Some days does really good with water and other days feels more tired.     Review of Systems   Constitutional: Negative for appetite change and fatigue.   Eyes: Negative for blurred vision, double vision and visual disturbance.   Cardiovascular: Negative for chest pain, palpitations and leg swelling.   Gastrointestinal: Negative for abdominal pain, constipation, diarrhea, nausea, vomiting and GERD.   Endocrine: Negative for polydipsia, polyphagia and polyuria.   Musculoskeletal: Positive for arthralgias. Negative for back pain and myalgias.  "  Neurological: Negative for dizziness, tremors, light-headedness, headache and memory problem.        Parasthesias negative   Psychiatric/Behavioral: Negative for sleep disturbance, depressed mood and stress. The patient is not nervous/anxious.      Objective   Body mass index is 25.97 kg/m².  Start weight MWM: 186 pounds.    Total weight loss: -16 pounds/-8.6%  Change in weight since last visit: none    Ht 172.7 cm (68\")   Wt 77.5 kg (170 lb 12.8 oz)   BMI 25.97 kg/m²       Result Review :                 Assessment and Plan    Diagnoses and all orders for this visit:    1. Overweight (BMI 25.0-29.9) (Primary)  Assessment & Plan:  Patient's (Body mass index is 25.97 kg/m².) indicates that they are overweight with health conditions that include dyslipidemias, osteoarthritis and IR/PCOS . Weight is improving with treatment. BMI is is above average; BMI management plan is completed. We discussed low calorie, low carb based diet program, portion control, increasing exercise, management of depression/anxiety/stress to control compensatory eating, pharmacologic options including diethylpropion and saxenda and an kesha-based approach such as [employer] Pal or Lose It.    I have instructed the patient to continue with pursuit of medical weight loss as a part of this program. Patient does meet criteria for use of anorectics at this time as BMI >25 with , hyperlipidemia and is not at treatment goal.     Continue nutritional focus and work towards new exercise FITT goal of: 2-2-4-2.   The current plan for this month includes: continue current exercise efforts and continue to work on lifestyle behavioral changes- bring back regular exercise and food journal. Increase saxenda 3-4 clicks as tolerated. Continue diethylpropion 2-3 times a day. Goal 4-6lb then will start to taper again.        Orders:  -     Diethylpropion HCl 25 MG tablet; TAKE ONE TABLET BY MOUTH DAILY AT 7am, one tablet AT 11am, AND one tablet AT 3pm  Dispense: " 84 each; Refill: 0    2. Depression, unspecified depression type  Assessment & Plan:  Patient's depression is stable. Their depression is currently in partial remission and the condition is improving with lifestyle modifications. This will be reassessed in 3 months. F/U as described:patient depression is being managed by their pcp. Continue to avoid stress/comfort eating.           Follow Up   Return in about 4 weeks (around 1/2/2023) for Next scheduled follow up.  Patient was given instructions and counseling regarding her condition or for health maintenance advice. Please see specific information pulled into the AVS if appropriate.     ROCCO Calero

## 2022-12-05 NOTE — ASSESSMENT & PLAN NOTE
Patient's (Body mass index is 25.97 kg/m².) indicates that they are overweight with health conditions that include dyslipidemias, osteoarthritis and IR/PCOS . Weight is improving with treatment. BMI is is above average; BMI management plan is completed. We discussed low calorie, low carb based diet program, portion control, increasing exercise, management of depression/anxiety/stress to control compensatory eating, pharmacologic options including diethylpropion and saxenda and an eksha-based approach such as [employer] Pal or Lose It.    I have instructed the patient to continue with pursuit of medical weight loss as a part of this program. Patient does meet criteria for use of anorectics at this time as BMI >25 with , hyperlipidemia and is not at treatment goal.     Continue nutritional focus and work towards new exercise FITT goal of: 2-2-4-2.   The current plan for this month includes: continue current exercise efforts and continue to work on lifestyle behavioral changes- bring back regular exercise and food journal. Increase saxenda 3-4 clicks as tolerated. Continue diethylpropion 2-3 times a day. Goal 4-6lb then will start to taper again.

## 2022-12-05 NOTE — ASSESSMENT & PLAN NOTE
Patient's depression is stable. Their depression is currently in partial remission and the condition is improving with lifestyle modifications. This will be reassessed in 3 months. F/U as described:patient depression is being managed by their pcp. Continue to avoid stress/comfort eating.

## 2022-12-28 ENCOUNTER — PRIOR AUTHORIZATION (OUTPATIENT)
Dept: BARIATRICS/WEIGHT MGMT | Facility: CLINIC | Age: 42
End: 2022-12-28

## 2023-01-11 ENCOUNTER — TELEMEDICINE (OUTPATIENT)
Dept: BARIATRICS/WEIGHT MGMT | Facility: CLINIC | Age: 43
End: 2023-01-11
Payer: COMMERCIAL

## 2023-01-11 VITALS — HEIGHT: 68 IN | HEART RATE: 80 BPM | BODY MASS INDEX: 26.07 KG/M2 | WEIGHT: 172 LBS

## 2023-01-11 DIAGNOSIS — E66.3 OVERWEIGHT (BMI 25.0-29.9): Primary | ICD-10-CM

## 2023-01-11 DIAGNOSIS — K21.9 GASTROESOPHAGEAL REFLUX DISEASE, UNSPECIFIED WHETHER ESOPHAGITIS PRESENT: ICD-10-CM

## 2023-01-11 PROCEDURE — 99214 OFFICE O/P EST MOD 30 MIN: CPT | Performed by: NURSE PRACTITIONER

## 2023-01-11 RX ORDER — BUPROPION HYDROCHLORIDE 75 MG/1
75 TABLET ORAL 2 TIMES DAILY
Qty: 60 TABLET | Refills: 2 | Status: SHIPPED | OUTPATIENT
Start: 2023-01-11 | End: 2023-02-06 | Stop reason: DRUGHIGH

## 2023-01-11 RX ORDER — OMEPRAZOLE 20 MG/1
20 CAPSULE, DELAYED RELEASE ORAL 2 TIMES DAILY
COMMUNITY

## 2023-01-11 NOTE — PROGRESS NOTES
Office Note      Date: 2023  Patient Name: Mariel Fox  MRN: [id number]  : 1980    Patient presents during the COVID-19 pandemic/federally declared state of public health emergency.  This service was conducted via Zoom.  Patient is located in [place].  Provider is located at her primary office location. The use of a video visit has been reviewed with the patient and verbal informed consent has been obtained.  Subjective  Subjective     Chief Complaint  Obesity Management follow-up    Subjective          Mariel Fox presents to [facility] via telehealth for obesity management.     Patient is unsatisfied with weight loss progress. Appetite is poorly controlled. Doesn't feel like diethylpropion is helping anymore. Now doing two a day of diethylpropion. Moved saxenda back to morning dose, was hard for her to take it consistently at 5:30. Reports no side effects of prescribed medications today. Having worsening reflux in the afternoon and evening. Can only tolerate small portions but then she's hungry an hour later. Added back omeprazole 20mg once a day last week.   B: build bar  L: pulled pork   D: 2 chicken strips from [employer]  Popcorn  Special K protein cereal   The patient is exercising 4-5 days a week, has increased efforts.   Review of Systems   Constitutional: Positive for appetite change. Negative for fatigue.   Eyes: Negative for blurred vision, double vision and visual disturbance.   Cardiovascular: Negative for chest pain and palpitations.   Gastrointestinal: Positive for GERD and indigestion. Negative for abdominal pain, constipation, diarrhea, nausea and vomiting.   Endocrine: Positive for polyphagia. Negative for polydipsia and polyuria.   Musculoskeletal: Negative for arthralgias, back pain and myalgias.   Neurological: Negative for dizziness, tremors, light-headedness, headache and memory problem.        Parasthesias negative  "  Psychiatric/Behavioral: Positive for stress. Negative for sleep disturbance and depressed mood. The patient is not nervous/anxious.          Objective   Body mass index is 26.15 kg/m².  Start weight MWM: 186 pounds.    Total weight loss: -14 pounds/-7.5/%  Change in weight since last visit: +2lb    Pulse 80   Ht 172.7 cm (68\")   Wt 78 kg (172 lb)   BMI 26.15 kg/m²       Result Review :                 Assessment and Plan    Diagnoses and all orders for this visit:    1. Overweight (BMI 25.0-29.9) (Primary)  Assessment & Plan:  Patient's (Body mass index is 26.15 kg/m².) indicates that they are overweight with health conditions that include dyslipidemias . Weight is worsening. BMI is is above average; BMI management plan is completed. We discussed low calorie, low carb based diet program, portion control, increasing exercise and pharmacologic options including bupropion.    I have instructed the patient to continue with pursuit of medical weight loss as a part of this program. Patient does meet criteria for use of anorectics at this time as BMI >25 with , hyperlipidemia and is not at treatment goal.     Continue nutritional focus and work towards new exercise FITT goal of: 2-2-4-2,  The current plan for this month includes: continue current exercise efforts, weight loss goal 4-6lbs this month and continue nutrition focus. Treatment goal 165lb (normal fat mass). Discontinue diethylpropion due to increase in hunger and increase in weight despite good effort and consistent calorie restriction. Discontinue saxenda, was only tolerating 0.6mg anyhow. Start bupropion bid, can increase next month if needed. Has follow up with Dr. Ojeda next week, advised to go ahead and increase omeprazole to bid and discuss worsening reflux at that follow up.        Orders:  -     buPROPion (WELLBUTRIN) 75 MG tablet; Take 1 tablet by mouth 2 (Two) Times a Day.  Dispense: 60 tablet; Refill: 2    2. Gastroesophageal reflux disease, " unspecified whether esophagitis present  Assessment & Plan:  Worsening despite no changes in medications. Added back omeprazole last week. Increase to 40mg and discuss with bariatrics next week. Also advised to discontinue saxenda for now, was only able to tolerate 0.6mg anyhow.         I spent 30 minutes caring for Mariel on this date of service. This time includes time spent by me in the following activities:preparing for the visit, obtaining and/or reviewing a separately obtained history, performing a medically appropriate examination and/or evaluation , counseling and educating the patient/family/caregiver, ordering medications, tests, or procedures and documenting information in the medical record  Follow Up   Return in about 4 weeks (around 2/8/2023) for Next scheduled follow up.  Patient was given instructions and counseling regarding her condition or for health maintenance advice. Please see specific information pulled into the AVS if appropriate.     ROCCO Calero

## 2023-01-12 NOTE — ASSESSMENT & PLAN NOTE
Patient's (Body mass index is 26.15 kg/m².) indicates that they are overweight with health conditions that include dyslipidemias . Weight is worsening. BMI is is above average; BMI management plan is completed. We discussed low calorie, low carb based diet program, portion control, increasing exercise and pharmacologic options including bupropion.    I have instructed the patient to continue with pursuit of medical weight loss as a part of this program. Patient does meet criteria for use of anorectics at this time as BMI >25 with , hyperlipidemia and is not at treatment goal.     Continue nutritional focus and work towards new exercise FITT goal of: 2-2-4-2,  The current plan for this month includes: continue current exercise efforts, weight loss goal 4-6lbs this month and continue nutrition focus. Treatment goal 165lb (normal fat mass). Discontinue diethylpropion due to increase in hunger and increase in weight despite good effort and consistent calorie restriction. Discontinue saxenda, was only tolerating 0.6mg anyhow. Start bupropion bid, can increase next month if needed. Has follow up with Dr. Ojeda next week, advised to go ahead and increase omeprazole to bid and discuss worsening reflux at that follow up.

## 2023-01-12 NOTE — ASSESSMENT & PLAN NOTE
Worsening despite no changes in medications. Added back omeprazole last week. Increase to 40mg and discuss with bariatrics next week. Also advised to discontinue saxenda for now, was only able to tolerate 0.6mg anyhow.

## 2023-01-19 ENCOUNTER — OFFICE VISIT (OUTPATIENT)
Dept: BARIATRICS/WEIGHT MGMT | Facility: CLINIC | Age: 43
End: 2023-01-19
Payer: COMMERCIAL

## 2023-01-19 VITALS
BODY MASS INDEX: 26.3 KG/M2 | OXYGEN SATURATION: 99 % | HEART RATE: 103 BPM | WEIGHT: 173.5 LBS | HEIGHT: 68 IN | TEMPERATURE: 98.9 F | RESPIRATION RATE: 16 BRPM | DIASTOLIC BLOOD PRESSURE: 70 MMHG | SYSTOLIC BLOOD PRESSURE: 116 MMHG

## 2023-01-19 DIAGNOSIS — Z13.21 MALNUTRITION SCREEN: ICD-10-CM

## 2023-01-19 DIAGNOSIS — Z13.0 SCREENING, IRON DEFICIENCY ANEMIA: ICD-10-CM

## 2023-01-19 DIAGNOSIS — K21.9 GASTROESOPHAGEAL REFLUX DISEASE, UNSPECIFIED WHETHER ESOPHAGITIS PRESENT: ICD-10-CM

## 2023-01-19 DIAGNOSIS — E55.9 HYPOVITAMINOSIS D: ICD-10-CM

## 2023-01-19 DIAGNOSIS — K91.2 POSTGASTRECTOMY MALABSORPTION: ICD-10-CM

## 2023-01-19 DIAGNOSIS — R53.83 FATIGUE, UNSPECIFIED TYPE: Primary | ICD-10-CM

## 2023-01-19 DIAGNOSIS — Z90.3 POSTGASTRECTOMY MALABSORPTION: ICD-10-CM

## 2023-01-19 PROCEDURE — 99213 OFFICE O/P EST LOW 20 MIN: CPT | Performed by: SURGERY

## 2023-01-19 NOTE — PROGRESS NOTES
[facility]  [address]    [facility] [address] [phone]        Patient Name:  Mariel Fox.  :  1980      Date of Visit: 2023      Reason for Visit:   3 years postop      HPI: Mariel Fox is a 42 y.o. female s/p LSG 20 w/ Dr. Ojeda    Seeing Racheal.  Taking Saxenda and Tenuent.    Stopped due to nausea, reflux, switched to Welbutrin.  Racheal started on Prilosec bid.  Feels better now, but some residual reflux on bid PPI.      Doing well now.  Getting 75 g prot/day, estimates, does not track.  Drinking 64 fluid oz/day. Last labs revealed  no vitamin deficiencies. Taking MVI.  Exercise: 4-5 days per week at gym, cardio + weight machines.     Presurgery weight: 249 pounds.  Today's weight is 78.7 kg (173 lb 8 oz) pounds, today's  Body mass index is 26.38 kg/m²., and@ weight loss since surgery is 76 pounds.      Past Medical History:   Diagnosis Date   • Anxiety and depression    • Chronic back pain     last steroid injection    • Dyspnea on exertion    • Endometriosis    • Fatigue    • Fatty liver     follows w/ GI ([place]), Mom w/ hx NUNES Cirrhosis, denies prior liver bx   • Fatty liver     follows w/ GI ([place]), Mom w/ hx NUNES Cirrhosis, denies prior liver bx 2020 ALT/AST:  Recent US with GI was normal, was discharged from their care   • Hyperlipidemia    • Hypertension    • Melanoma in situ (HCC)     (R) forearm, resected, no subsequet tx, follows w/ derm   • MTHFR mutation     hx miscarriage x 2, no hx DVT/PE, takes ASA81 mg qod   • Obesity    • PCOS (polycystic ovarian syndrome)     on Spironolactone   • Psoriatic arthritis (HCC)     follows w/ Rheumatology ([place]), on Enbrel/Gabapentin + prn Ibupfroen     Past Surgical History:   Procedure Laterality Date   • D & C HYSTEROSCOPY      x 5   • ENDOSCOPY N/A 2020    Procedure: ESOPHAGOGASTRODUODENOSCOPY;  Surgeon: Shayy Ojeda MD;   Location:  [facility];  Service: Bariatric;  Laterality: N/A;   • GASTRIC SLEEVE LAPAROSCOPIC N/A 2/4/2020    Procedure: GASTRIC SLEEVE LAPAROSCOPIC;  Surgeon: Shayy Ojeda MD;  Location:  [facility];  Service: Bariatric;  Laterality: N/A;   • PLANTAR FASCIA SURGERY Left 01/19/2021   • SALPINGECTOMY Left 2013    ectopic pregnancy   • SKIN SURGERY      wide local excision right forearm melanoma in situ, also another for dysplastic nevus.   • TARSAL TUNNEL RELEASE Right 2015    (R) foot   • TONSILLECTOMY  1990     Outpatient Medications Marked as Taking for the 1/19/23 encounter (Office Visit) with Shayy Ojeda MD   Medication Sig Dispense Refill   • buPROPion (WELLBUTRIN) 75 MG tablet Take 1 tablet by mouth 2 (Two) Times a Day. 60 tablet 2   • diclofenac (VOLTAREN) 75 MG EC tablet Take 75 mg by mouth Daily.     • etanercept (ENBREL) 50 MG/ML solution prefilled syringe injection 50 mg 1 (One) Time Per Week.     • Insulin Pen Needle 32G X 4 MM misc 1 dose Daily. 90 each 2   • levonorgestrel (Mirena, 52 MG,) 20 MCG/24HR IUD 1 each by Intrauterine route.     • multivitamin with minerals (Multivitamin Adults) tablet tablet Take 1 tablet by mouth Daily. 30 tablet 2   • omeprazole (priLOSEC) 20 MG capsule Take 20 mg by mouth 2 (Two) Times a Day.     • ondansetron (ZOFRAN) 8 MG tablet Take 1 tablet by mouth Every 8 (Eight) Hours As Needed for Nausea. 30 tablet 0   • sertraline (ZOLOFT) 100 MG tablet Take 150 mg by mouth Daily.     • simvastatin (ZOCOR) 20 MG tablet Take 20 mg by mouth Every Night.  1   • tiZANidine (ZANAFLEX) 4 MG tablet Take  by mouth Daily.         Allergies   Allergen Reactions   • Adhesive Tape Itching and Rash       Social History     Socioeconomic History   • Marital status:    Tobacco Use   • Smoking status: Never   • Smokeless tobacco: Never   Substance and Sexual Activity   • Alcohol use: No   • Drug use: No   • Sexual activity: Defer       /70 (BP Location: Left arm,  "Patient Position: Sitting)   Pulse 103   Temp 98.9 °F (37.2 °C)   Resp 16   Ht 172.7 cm (68\")   Wt 78.7 kg (173 lb 8 oz)   SpO2 99%   BMI 26.38 kg/m²     Physical Exam  Constitutional:       General: She is not in acute distress.     Appearance: She is well-developed. She is not diaphoretic.   HENT:      Head: Normocephalic and atraumatic.      Mouth/Throat:      Pharynx: No oropharyngeal exudate.   Eyes:      Conjunctiva/sclera: Conjunctivae normal.      Pupils: Pupils are equal, round, and reactive to light.   Pulmonary:      Effort: Pulmonary effort is normal. No respiratory distress.   Abdominal:      General: There is no distension.      Palpations: Abdomen is soft.   Skin:     General: Skin is warm and dry.      Coloration: Skin is not pale.   Neurological:      Mental Status: She is alert and oriented to person, place, and time.      Cranial Nerves: No cranial nerve deficit.   Psychiatric:         Behavior: Behavior normal.         Thought Content: Thought content normal.           Assessment:  3 years s/p LSG 2/4/20 w/ Dr. Ojeda    ICD-10-CM ICD-9-CM   1. Fatigue, unspecified type  R53.83 780.79   2. Hypovitaminosis D  E55.9 268.9   3. Screening, iron deficiency anemia  Z13.0 V78.0   4. Postgastrectomy malabsorption  K91.2 579.3    Z90.3    5. Malnutrition screen  Z13.21 V77.2   6. Gastroesophageal reflux disease, unspecified whether esophagitis present  K21.9 530.81       BMI is >= 25 and <30. (Overweight) The following options were offered after discussion;: exercise counseling/recommendations and nutrition counseling/recommendations      Plan:  Doing well. Continue w/ good food choices and healthy habits.  Continue protein >70g/day.  Continue routine exercise.  Routine bariatric labs ordered.  Continue vitamins w/ adjustments pending lab results.  Call w/ problems/concerns.     To see Racheal in next month.  Did not tolerate GLP-1 while not on PPI due to reflux.  She could try again now that " she's on bid PPI.  Off Saxenda, and on Prilosec, has minimal reflux.  Workup GERD with UGI, EGD.    The patient was instructed to follow up in 6 months, sooner if needed.    note: approx 15 of the 25 minute visit was spent counseling on nutrition and necessary dietary/lifestyle modifications face to face.    Shayy Ojeda MD

## 2023-01-23 LAB
25(OH)D3+25(OH)D2 SERPL-MCNC: 36.1 NG/ML (ref 30–100)
ALBUMIN SERPL-MCNC: 4.8 G/DL (ref 3.8–4.8)
ALBUMIN/GLOB SERPL: 1.9 {RATIO} (ref 1.2–2.2)
ALP SERPL-CCNC: 77 IU/L (ref 44–121)
ALT SERPL-CCNC: 21 IU/L (ref 0–32)
AST SERPL-CCNC: 22 IU/L (ref 0–40)
BASOPHILS # BLD AUTO: 0.1 X10E3/UL (ref 0–0.2)
BASOPHILS NFR BLD AUTO: 1 %
BILIRUB SERPL-MCNC: 1.1 MG/DL (ref 0–1.2)
BUN SERPL-MCNC: 11 MG/DL (ref 6–24)
BUN/CREAT SERPL: 12 (ref 9–23)
CALCIUM SERPL-MCNC: 9.4 MG/DL (ref 8.7–10.2)
CHLORIDE SERPL-SCNC: 101 MMOL/L (ref 96–106)
CO2 SERPL-SCNC: 25 MMOL/L (ref 20–29)
CREAT SERPL-MCNC: 0.91 MG/DL (ref 0.57–1)
EGFRCR SERPLBLD CKD-EPI 2021: 81 ML/MIN/1.73
EOSINOPHIL # BLD AUTO: 0.1 X10E3/UL (ref 0–0.4)
EOSINOPHIL NFR BLD AUTO: 1 %
ERYTHROCYTE [DISTWIDTH] IN BLOOD BY AUTOMATED COUNT: 12.5 % (ref 11.7–15.4)
FERRITIN SERPL-MCNC: 56 NG/ML (ref 15–150)
FOLATE SERPL-MCNC: 14.5 NG/ML
GLOBULIN SER CALC-MCNC: 2.5 G/DL (ref 1.5–4.5)
GLUCOSE SERPL-MCNC: 81 MG/DL (ref 70–99)
HCT VFR BLD AUTO: 40.9 % (ref 34–46.6)
HGB BLD-MCNC: 13.8 G/DL (ref 11.1–15.9)
IMM GRANULOCYTES # BLD AUTO: 0 X10E3/UL (ref 0–0.1)
IMM GRANULOCYTES NFR BLD AUTO: 0 %
IRON SERPL-MCNC: 118 UG/DL (ref 27–159)
LYMPHOCYTES # BLD AUTO: 2.4 X10E3/UL (ref 0.7–3.1)
LYMPHOCYTES NFR BLD AUTO: 32 %
MCH RBC QN AUTO: 31.6 PG (ref 26.6–33)
MCHC RBC AUTO-ENTMCNC: 33.7 G/DL (ref 31.5–35.7)
MCV RBC AUTO: 94 FL (ref 79–97)
METHYLMALONATE SERPL-SCNC: 203 NMOL/L (ref 0–378)
MONOCYTES # BLD AUTO: 0.6 X10E3/UL (ref 0.1–0.9)
MONOCYTES NFR BLD AUTO: 8 %
NEUTROPHILS # BLD AUTO: 4.3 X10E3/UL (ref 1.4–7)
NEUTROPHILS NFR BLD AUTO: 58 %
PLATELET # BLD AUTO: 324 X10E3/UL (ref 150–450)
POTASSIUM SERPL-SCNC: 4.8 MMOL/L (ref 3.5–5.2)
PREALB SERPL-MCNC: 31 MG/DL (ref 12–34)
PROT SERPL-MCNC: 7.3 G/DL (ref 6–8.5)
RBC # BLD AUTO: 4.37 X10E6/UL (ref 3.77–5.28)
SODIUM SERPL-SCNC: 139 MMOL/L (ref 134–144)
VIT B1 BLD-SCNC: 138.9 NMOL/L (ref 66.5–200)
WBC # BLD AUTO: 7.5 X10E3/UL (ref 3.4–10.8)

## 2023-02-02 ENCOUNTER — TELEMEDICINE (OUTPATIENT)
Dept: BARIATRICS/WEIGHT MGMT | Facility: CLINIC | Age: 43
End: 2023-02-02
Payer: COMMERCIAL

## 2023-02-02 VITALS — HEIGHT: 68 IN | WEIGHT: 173 LBS | BODY MASS INDEX: 26.22 KG/M2 | HEART RATE: 76 BPM

## 2023-02-02 DIAGNOSIS — E66.3 OVERWEIGHT (BMI 25.0-29.9): Primary | ICD-10-CM

## 2023-02-02 DIAGNOSIS — K21.9 GASTROESOPHAGEAL REFLUX DISEASE, UNSPECIFIED WHETHER ESOPHAGITIS PRESENT: ICD-10-CM

## 2023-02-02 PROCEDURE — 99214 OFFICE O/P EST MOD 30 MIN: CPT | Performed by: NURSE PRACTITIONER

## 2023-02-02 NOTE — ASSESSMENT & PLAN NOTE
Patient's (Body mass index is 26.3 kg/m².) indicates that they are overweight with health conditions that include dyslipidemias . Weight is improving with treatment. BMI is is above average; BMI management plan is completed. We discussed low calorie, low carb based diet program, portion control, increasing exercise, pharmacologic options including wellbutrin and an kesha-based approach such as [employer] Pal or Lose It.    I have instructed the patient to continue with pursuit of medical weight loss as a part of this program. Patient does meet criteria for use of anorectics at this time as BMI >25 with , hyperlipidemia and is not at treatment goal.     Continue nutritional focus and work towards new exercise FITT goal of: 2-2-4-2.  The current plan for this month includes: continue current exercise efforts, weight loss goal 4-6lbs this month and continue nutrition focus. Could be gaining muscle, body composition analysis is most accurate after 3 months of changes.    Increase wellbutrin. Hold saxenda until after upper GI due to no help and could be contributing to reflux.  Treatment goal get back to 165lb.

## 2023-02-02 NOTE — PROGRESS NOTES
Office Note      Date: 2023  Patient Name: Mariel Fox  MRN: [id number]  : 1980    Patient presents during the COVID-19 pandemic/federally declared state of public health emergency.  This service was conducted via [employer].  Patient is located at her work address.  Provider is located at her primary office location. The use of a video visit has been reviewed with the patient and verbal informed consent has been obtained.  Subjective  Subjective     Chief Complaint  Obesity Management follow-up    Subjective          Mariel Fox presents to [facility] via telehealth for obesity management. LSG 2020 with Dr. Ojeda. Presurgery weight: 249 pounds, goal 165lb. Saw Dr. Ojeda. Has an upper GI .     Patient is unsatisfied with weight loss progress. She can see some change in her muscle tone but her weight is not decreasing despite good effort. Appetite is moderately controlled, better than last time since adding wellbutrin. Did have headaches when she was starting it but those resolved. Has tried to use saxenda a few times at the 0.6mg dose but she can't tell any difference in her appetite with it. Still has burning reflux late afternoon with or without saxenda. Kept food journal every day this month. Calories 1250/day, net carbs 75g/day, protein 100g/day. Drinking water all day long. BP with Gyn last week was 110/60. No changes in periods, has had mirena 1.5 years.   The patient is exercising 5 days a week -25 minutes of cardio (arc )- stair climber 5 minutes, strength training each time.     Review of Systems   Constitutional: Positive for appetite change. Negative for fatigue.   Eyes: Negative for blurred vision, double vision and visual disturbance.   Cardiovascular: Negative for chest pain and palpitations.   Gastrointestinal: Positive for GERD. Negative for abdominal pain, constipation, diarrhea, nausea and vomiting.   Endocrine: Negative for  "polydipsia, polyphagia and polyuria.   Musculoskeletal: Negative for arthralgias, back pain and myalgias.   Neurological: Positive for headache. Negative for dizziness, tremors, light-headedness and memory problem.        Parasthesias negative   Psychiatric/Behavioral: Negative for sleep disturbance, depressed mood and stress. The patient is not nervous/anxious.          Objective   Body mass index is 26.3 kg/m².  Start weight: 186 pounds.    Total weight loss: -13 pounds/-7%  Change in weight since last visit: none    Pulse 76   Ht 172.7 cm (68\")   Wt 78.5 kg (173 lb)   BMI 26.30 kg/m²       Result Review :                 Assessment and Plan    Diagnoses and all orders for this visit:    1. Overweight (BMI 25.0-29.9) (Primary)  Assessment & Plan:  Patient's (Body mass index is 26.3 kg/m².) indicates that they are overweight with health conditions that include dyslipidemias . Weight is improving with treatment. BMI is is above average; BMI management plan is completed. We discussed low calorie, low carb based diet program, portion control, increasing exercise, pharmacologic options including wellbutrin and an kesha-based approach such as [employer] Pal or Lose It.    I have instructed the patient to continue with pursuit of medical weight loss as a part of this program. Patient does meet criteria for use of anorectics at this time as BMI >25 with , hyperlipidemia and is not at treatment goal.     Continue nutritional focus and work towards new exercise FITT goal of: 2-2-4-2.  The current plan for this month includes: continue current exercise efforts, weight loss goal 4-6lbs this month and continue nutrition focus. Could be gaining muscle, body composition analysis is most accurate after 3 months of changes.    Increase wellbutrin. Hold saxenda until after upper GI due to no help and could be contributing to reflux.  Treatment goal get back to 165lb.       2. Gastroesophageal reflux disease, unspecified whether " esophagitis present  Assessment & Plan:  Proceeding with UGI 2/22. Hold saxenda.         I spent 30 minutes caring for Mariel on this date of service. This time includes time spent by me in the following activities:preparing for the visit, reviewing tests, obtaining and/or reviewing a separately obtained history, performing a medically appropriate examination and/or evaluation , counseling and educating the patient/family/caregiver, ordering medications, tests, or procedures and documenting information in the medical record  Follow Up   Return in about 4 weeks (around 3/2/2023) for Next scheduled follow up.  Patient was given instructions and counseling regarding her condition or for health maintenance advice. Please see specific information pulled into the AVS if appropriate.     Racheal Gonsalez, APRN

## 2023-02-03 ENCOUNTER — PATIENT MESSAGE (OUTPATIENT)
Dept: BARIATRICS/WEIGHT MGMT | Facility: CLINIC | Age: 43
End: 2023-02-03
Payer: COMMERCIAL

## 2023-02-03 DIAGNOSIS — E66.3 OVERWEIGHT (BMI 25.0-29.9): Primary | ICD-10-CM

## 2023-02-06 RX ORDER — BUPROPION HYDROCHLORIDE 150 MG/1
150 TABLET, EXTENDED RELEASE ORAL 2 TIMES DAILY
Qty: 60 TABLET | Refills: 2 | Status: SHIPPED | OUTPATIENT
Start: 2023-02-06 | End: 2023-03-07

## 2023-02-06 NOTE — TELEPHONE ENCOUNTER
From: Mariel DOUGLAS Close  To: Racheal Gonsalez  Sent: 2/3/2023 2:25 PM EST  Subject: Wellbutrin     My pharmacy says they haven't gotten the updated script for the 150 2X/day. It goes to [facility] [place].    Thank you!

## 2023-02-23 ENCOUNTER — HOSPITAL ENCOUNTER (OUTPATIENT)
Dept: GENERAL RADIOLOGY | Facility: HOSPITAL | Age: 43
Discharge: HOME | End: 2023-02-23
Admitting: SURGERY
Payer: COMMERCIAL

## 2023-02-23 PROCEDURE — 74240 X-RAY XM UPR GI TRC 1CNTRST: CPT

## 2023-02-23 RX ORDER — PHENDIMETRAZINE TARTRATE 35 MG/1
TABLET ORAL
Qty: 30 EACH | Refills: 0 | Status: SHIPPED | OUTPATIENT
Start: 2023-02-23 | End: 2023-03-07

## 2023-02-23 RX ADMIN — BARIUM SULFATE 183 ML: 960 POWDER, FOR SUSPENSION ORAL at 11:01

## 2023-03-06 ENCOUNTER — PREP FOR SURGERY (OUTPATIENT)
Dept: OTHER | Facility: HOSPITAL | Age: 43
End: 2023-03-06
Payer: COMMERCIAL

## 2023-03-06 DIAGNOSIS — K21.9 GASTROESOPHAGEAL REFLUX DISEASE, UNSPECIFIED WHETHER ESOPHAGITIS PRESENT: Primary | ICD-10-CM

## 2023-03-06 RX ORDER — SODIUM CHLORIDE 0.9 % (FLUSH) 0.9 %
3-10 SYRINGE (ML) INJECTION AS NEEDED
OUTPATIENT
Start: 2023-03-06

## 2023-03-06 RX ORDER — SODIUM CHLORIDE 9 MG/ML
150 INJECTION, SOLUTION INTRAVENOUS CONTINUOUS
OUTPATIENT
Start: 2023-03-06

## 2023-03-06 RX ORDER — SODIUM CHLORIDE 9 MG/ML
40 INJECTION, SOLUTION INTRAVENOUS AS NEEDED
OUTPATIENT
Start: 2023-03-06

## 2023-03-06 RX ORDER — SODIUM CHLORIDE 0.9 % (FLUSH) 0.9 %
3 SYRINGE (ML) INJECTION EVERY 12 HOURS SCHEDULED
OUTPATIENT
Start: 2023-03-06

## 2023-03-07 ENCOUNTER — OFFICE VISIT (OUTPATIENT)
Dept: BARIATRICS/WEIGHT MGMT | Facility: CLINIC | Age: 43
End: 2023-03-07
Payer: COMMERCIAL

## 2023-03-07 VITALS
HEART RATE: 63 BPM | BODY MASS INDEX: 27.13 KG/M2 | SYSTOLIC BLOOD PRESSURE: 120 MMHG | OXYGEN SATURATION: 100 % | WEIGHT: 179 LBS | DIASTOLIC BLOOD PRESSURE: 82 MMHG | HEIGHT: 68 IN

## 2023-03-07 VITALS
OXYGEN SATURATION: 96 % | SYSTOLIC BLOOD PRESSURE: 120 MMHG | WEIGHT: 179 LBS | HEART RATE: 63 BPM | RESPIRATION RATE: 16 BRPM | DIASTOLIC BLOOD PRESSURE: 82 MMHG | HEIGHT: 68 IN | TEMPERATURE: 98.4 F | BODY MASS INDEX: 27.13 KG/M2

## 2023-03-07 DIAGNOSIS — R11.0 NAUSEA: ICD-10-CM

## 2023-03-07 DIAGNOSIS — E28.2 PCOS (POLYCYSTIC OVARIAN SYNDROME): ICD-10-CM

## 2023-03-07 DIAGNOSIS — E66.3 OVERWEIGHT (BMI 25.0-29.9): Primary | ICD-10-CM

## 2023-03-07 DIAGNOSIS — K21.9 GASTROESOPHAGEAL REFLUX DISEASE, UNSPECIFIED WHETHER ESOPHAGITIS PRESENT: Primary | ICD-10-CM

## 2023-03-07 PROCEDURE — 99214 OFFICE O/P EST MOD 30 MIN: CPT | Performed by: NURSE PRACTITIONER

## 2023-03-07 PROCEDURE — 99214 OFFICE O/P EST MOD 30 MIN: CPT | Performed by: PHYSICIAN ASSISTANT

## 2023-03-07 RX ORDER — DIETHYLPROPION HYDROCHLORIDE 25 MG/1
TABLET ORAL
Qty: 84 EACH | Refills: 0 | Status: SHIPPED | OUTPATIENT
Start: 2023-03-07

## 2023-03-07 RX ORDER — ONDANSETRON HYDROCHLORIDE 8 MG/1
8 TABLET, FILM COATED ORAL EVERY 8 HOURS PRN
Qty: 30 TABLET | Refills: 0 | Status: SHIPPED | OUTPATIENT
Start: 2023-03-07

## 2023-03-07 NOTE — ASSESSMENT & PLAN NOTE
Patient's (Body mass index is 27.22 kg/m².) indicates that they are overweight with health conditions that include dyslipidemias . Weight is worsening. BMI is is above average; BMI management plan is completed. We discussed low calorie, low carb based diet program, portion control, increasing exercise, pharmacologic options including diethylpropion, saxenda and an kesha-based approach such as [employer] Pal or Lose It.    I have instructed the patient to continue with pursuit of medical weight loss as a part of this program. Patient does meet criteria for use of anorectics at this time as BMI > 27 with coexisting, hyperlipidemia and is not at treatment goal.     Continue nutritional focus and work towards new exercise FITT goal of: 2-2-4-2.   The current plan for this month includes: continue current exercise efforts, weight loss goal 4-6lbs this month and continue nutrition focus. Decrease calories to 3439-8328. Add back diethylpropion three times daily. Add back saxenda, titrate up as tolerated. Consider wegovy after EGD if appropriate and if BMI >27.

## 2023-03-07 NOTE — PROGRESS NOTES
"[facility]  [address]    [facility] [id number] [phone]        Patient Name:  Mariel Fox.  :  1980      Date of Visit: 3/9/2023      Reason for Visit:   GERD, nausea    HPI: Mariel Fox is a 42 y.o. female s/p LSG 20 w/ Dr. Ojeda    LOV 23 w/ Dr. Ojeda:  \"  Seeing Racheal.  Taking Saxenda and Tenuent.    Stopped due to nausea, reflux, switched to Welbutrin.  Racheal started on Prilosec bid.  Feels better now, but some residual reflux on bid PPI.  \"  UGI 23 @[facility] - unremarkable.     Continues on Prilosec OTC BID.  Will be restarting Saxenda + Tenuate.  No new issues.         Presurgery weight: 249 pounds.  Today's weight is 81.2 kg (179 lb) pounds, today's  Body mass index is 27.62 kg/m²., and weight loss since surgery is 70 pounds.      Past Medical History:   Diagnosis Date   • Anxiety and depression    • Chronic back pain     last steroid injection    • Dyspnea on exertion    • Endometriosis    • Fatigue    • Fatty liver     follows w/ GI ([place]), Mom w/ hx NUNES Cirrhosis, denies prior liver bx   • Hyperlipidemia    • Hypertension    • Melanoma in situ (HCC)     (R) forearm, resected, no subsequet tx, follows w/ derm   • MTHFR mutation     hx miscarriage x 2, no hx DVT/PE, takes ASA81 mg qod   • Obesity    • PCOS (polycystic ovarian syndrome)     on Spironolactone   • Psoriatic arthritis (HCC)     follows w/ Rheumatology ([place]), on Enbrel/Gabapentin + prn Ibupfroen     Past Surgical History:   Procedure Laterality Date   • D & C HYSTEROSCOPY      x 5   • ENDOSCOPY N/A 2020    Procedure: ESOPHAGOGASTRODUODENOSCOPY;  Surgeon: Shayy Ojeda MD;  Location:  [facility];  Service: Bariatric;  Laterality: N/A;   • GASTRIC SLEEVE LAPAROSCOPIC N/A 2020    Procedure: GASTRIC SLEEVE LAPAROSCOPIC;  Surgeon: Shayy Ojeda MD;  Location:  [facility];  Service: Bariatric;  Laterality: N/A;   • " "PLANTAR FASCIA SURGERY Left 01/19/2021   • SALPINGECTOMY Left 2013    ectopic pregnancy   • SKIN SURGERY      wide local excision right forearm melanoma in situ, also another for dysplastic nevus.   • TARSAL TUNNEL RELEASE Right 2015    (R) foot   • TONSILLECTOMY  1990     Outpatient Medications Marked as Taking for the 3/7/23 encounter (Office Visit) with Ailyn Mobley PA   Medication Sig Dispense Refill   • diclofenac (VOLTAREN) 75 MG EC tablet Take 1 tablet by mouth Daily.     • Diethylpropion HCl 25 MG tablet Take one tablet at 11am, one tablet at 3pm, and one tablet at 7pm. 84 each 0   • etanercept (ENBREL) 50 MG/ML solution prefilled syringe injection 1 mL 1 (One) Time Per Week.     • Insulin Pen Needle 32G X 4 MM misc 1 dose Daily. 90 each 2   • levonorgestrel (Mirena, 52 MG,) 20 MCG/24HR IUD 1 each by Intrauterine route.     • multivitamin with minerals (Multivitamin Adults) tablet tablet Take 1 tablet by mouth Daily. 30 tablet 2   • omeprazole (priLOSEC) 20 MG capsule Take 1 capsule by mouth 2 (Two) Times a Day.     • ondansetron (ZOFRAN) 8 MG tablet Take 1 tablet by mouth Every 8 (Eight) Hours As Needed for Nausea. 30 tablet 0   • sertraline (ZOLOFT) 100 MG tablet Take 1.5 tablets by mouth Daily.     • simvastatin (ZOCOR) 20 MG tablet Take 1 tablet by mouth Every Night.  1       Allergies   Allergen Reactions   • Adhesive Tape Itching and Rash       Social History     Socioeconomic History   • Marital status:    Tobacco Use   • Smoking status: Never   • Smokeless tobacco: Never   Substance and Sexual Activity   • Alcohol use: No   • Drug use: No   • Sexual activity: Defer       /82 (BP Location: Left arm, Patient Position: Sitting)   Pulse 63   Temp 98.4 °F (36.9 °C)   Resp 16   Ht 171.5 cm (67.5\")   Wt 81.2 kg (179 lb)   SpO2 96%   BMI 27.62 kg/m²     Physical Exam  Constitutional:       Appearance: She is well-developed.      Comments: wearing a mask   Eyes:      General: No " scleral icterus.  Cardiovascular:      Rate and Rhythm: Normal rate.   Pulmonary:      Effort: Pulmonary effort is normal.   Abdominal:      Palpations: Abdomen is soft.      Tenderness: There is no abdominal tenderness.      Hernia: No hernia is present.   Musculoskeletal:         General: Normal range of motion.   Skin:     General: Skin is warm and dry.      Findings: No rash.   Neurological:      Mental Status: She is alert.   Psychiatric:         Behavior: Behavior is cooperative.         Judgment: Judgment normal.           Assessment:  3 years s/p LSG 2/4/20 w/ Dr. Ojeda    ICD-10-CM ICD-9-CM   1. Gastroesophageal reflux disease, unspecified whether esophagitis present  K21.9 530.81         Plan:   Will proceed w/ EGD @[facility] w/ Dr. Ojeda for further eval.  Risks/benefits discussed.  Hold Tenuate x 2 wks prior.  Hold Saxenda x 1 week prior.  Additional input to follow.         LAURA Mart

## 2023-03-07 NOTE — PROGRESS NOTES
[facility]  [address]  [place] [address]     Office Note      Date: 2023  Patient Name: Mariel Fox  MRN: [id number]  : 1980  Subjective  Subjective     Chief Complaint  Obesity Management follow-up          Mariel Fox presents to [facility] for obesity management. LSG 2020 with Dr. Ojeda. Presurgery weight: 249 pounds, goal 165lb.  Patient is unsatisfied with weight loss progress. Appetite is moderately controlled. Not taking saxenda at this time, has noticed no difference in her GI symptoms. Is scheduled for an EGD in 2 weeks. Reports no side effects of prescribed medications today. The patient is taking multivitamin and is not taking fish oil. The patient is not using a food journal.    The patient is exercising a lot with a FITT score of:    Frequency Intensity Time Strength Training   []   0, none []   0 []   0 []   0   []   1 (1-2x/week) []   1 (light) []   1 (<10 min) []   1 (1x/week)   [x]   2 (3-5x/week) [x]   2 (moderate) []   2 (10-20 min) []   2 (2x/week)   []   3 (daily) []   3 (moderately hard)  []   4 (very hard) []   3 (20-30 min)  [x]   4 (>30 min) [x]   3 (3-4x/week)     Review of Systems   Constitutional: Negative for appetite change and fatigue.   Eyes: Negative for blurred vision, double vision and visual disturbance.   Cardiovascular: Negative for chest pain and palpitations.   Gastrointestinal: Positive for nausea. Negative for abdominal pain, constipation, diarrhea, vomiting and GERD.   Endocrine: Negative for polydipsia, polyphagia and polyuria.   Musculoskeletal: Negative for arthralgias, back pain and myalgias.   Neurological: Negative for dizziness, tremors, light-headedness, headache and memory problem.        Parasthesias negative   Psychiatric/Behavioral: Negative for sleep disturbance, depressed mood and stress. The patient is not nervous/anxious.      Objective   Start  "weight: 186 pounds.    Total Loss lb/%Loss of beginning body weight (BBW): -7lb/-3.76%  Change in weight since last visit: +9.5    Body mass index is 27.22 kg/m².   Body composition analysis completed and showed:   %body fat: 37.9  Measurements (in inches)  Waist: 29.75    Vital Signs:   /82   Pulse 63   Ht 172.7 cm (68\")   Wt 81.2 kg (179 lb)   SpO2 100%   BMI 27.22 kg/m²     Physical Exam   General appears stated age and normal appearance   HEENT PERRLA, EOM intact and conjunctivae normal   Chest/lungs Normal rate, Regular rhythm and Breathing is unlabored   Extremities without edema   Neuro Good historian and No focal deficit   Skin Warm, dry, intact   Psych normal behavior, normal thought content and normal concentration     Result Review :                Assessment / Plan        Diagnoses and all orders for this visit:    1. Overweight (BMI 25.0-29.9) (Primary)  Assessment & Plan:  Patient's (Body mass index is 27.22 kg/m².) indicates that they are overweight with health conditions that include dyslipidemias . Weight is worsening. BMI is is above average; BMI management plan is completed. We discussed low calorie, low carb based diet program, portion control, increasing exercise, pharmacologic options including diethylpropion, saxenda and an kesha-based approach such as [employer] Pal or Lose It.    I have instructed the patient to continue with pursuit of medical weight loss as a part of this program. Patient does meet criteria for use of anorectics at this time as BMI > 27 with coexisting, hyperlipidemia and is not at treatment goal.     Continue nutritional focus and work towards new exercise FITT goal of: 2-2-4-2.   The current plan for this month includes: continue current exercise efforts, weight loss goal 4-6lbs this month and continue nutrition focus. Decrease calories to 1719-0817. Add back diethylpropion three times daily. Add back saxenda, titrate up as tolerated. Consider wegovy after EGD if " appropriate and if BMI >27.        Orders:  -     Diethylpropion HCl 25 MG tablet; Take one tablet at 11am, one tablet at 3pm, and one tablet at 7pm.  Dispense: 84 each; Refill: 0    2. PCOS (polycystic ovarian syndrome)  Assessment & Plan:  Denies symptoms. Add back saxenda.       3. Nausea  -     ondansetron (ZOFRAN) 8 MG tablet; Take 1 tablet by mouth Every 8 (Eight) Hours As Needed for Nausea.  Dispense: 30 tablet; Refill: 0      I spent 31 minutes caring for Mariel on this date of service. This time includes time spent by me in the following activities:preparing for the visit, performing a medically appropriate examination and/or evaluation , counseling and educating the patient/family/caregiver, ordering medications, tests, or procedures and documenting information in the medical record  Follow Up   Return in about 4 weeks (around 4/4/2023) for Next scheduled follow up.  Patient was given instructions and counseling regarding her condition or for health maintenance advice. Please see specific information pulled into the AVS if appropriate.     Racheal Gonsalez, APRN  03/07/2023

## 2023-04-12 ENCOUNTER — TELEMEDICINE (OUTPATIENT)
Dept: BARIATRICS/WEIGHT MGMT | Facility: CLINIC | Age: 43
End: 2023-04-12
Payer: COMMERCIAL

## 2023-04-12 VITALS — BODY MASS INDEX: 26.98 KG/M2 | HEIGHT: 68 IN | WEIGHT: 178 LBS

## 2023-04-12 DIAGNOSIS — E28.2 PCOS (POLYCYSTIC OVARIAN SYNDROME): ICD-10-CM

## 2023-04-12 DIAGNOSIS — E66.3 OVERWEIGHT (BMI 25.0-29.9): Primary | ICD-10-CM

## 2023-04-12 PROCEDURE — 99213 OFFICE O/P EST LOW 20 MIN: CPT | Performed by: NURSE PRACTITIONER

## 2023-04-12 RX ORDER — DIETHYLPROPION HYDROCHLORIDE 25 MG/1
TABLET ORAL
Qty: 84 EACH | Refills: 0 | Status: SHIPPED | OUTPATIENT
Start: 2023-04-12

## 2023-04-12 NOTE — PROGRESS NOTES
"       Office Note      Date: 2023  Patient Name: Mariel Fox  MRN: [id number]  : 1980    Patient presents during the COVID-19 pandemic/federally declared [facility] of public health emergency.  This service was conducted via Zoom.  Patient is located at her home address.  Provider is located at her home address. The use of a video visit has been reviewed with the patient and verbal informed consent has been obtained.  Subjective  Subjective     Chief Complaint  Obesity Management follow-up    Subjective          Mariel Fox presents to [facility] via telehealth for obesity management.     Patient is satisfied with weight loss progress. Appetite is well controlled, much better since restarting diethylpropion. Also restarted saxenda at 0.6mg. Reports no side effects of prescribed medications today. Taking prevacid once a day. Went on vacation last week for spring break and took a break from exercise otherwise she continues her rigorous routine. Continues to focus on adequate protein and hydration.     Objective   Body mass index is 27.47 kg/m².  Start weight MWM: 186 pounds.    Total weight loss: -8 pounds/-4.3%  Change in weight since last visit: -1lb    Ht 171.5 cm (67.5\")   Wt 80.7 kg (178 lb)   BMI 27.47 kg/m²       Result Review :                 Assessment and Plan    Diagnoses and all orders for this visit:    1. Overweight (BMI 25.0-29.9) (Primary)  Assessment & Plan:  Patient's (Body mass index is 27.47 kg/m².) indicates that they are overweight with health conditions that include dyslipidemias and PCOS . Weight is improving with treatment. BMI is is above average; BMI management plan is completed. We discussed low calorie, low carb based diet program, portion control, increasing exercise, pharmacologic options including diethylpropion and saxenda and an kesha-based approach such as [employer] Pal or Lose It.    I have instructed the patient to continue with " pursuit of medical weight loss as a part of this program. Patient does meet criteria for use of anorectics at this time as BMI > 27 with coexisting, hyperlipidemia and is not at treatment goal.     Continue nutritional focus and work towards new exercise FITT goal of: 2-3-4-3.  The current plan for this month includes: continue current exercise efforts, weight loss goal 4-6lbs this month, continue to work on lifestyle behavioral changes and continue nutrition focus. Continue current medications.        Orders:  -     Diethylpropion HCl 25 MG tablet; Take one tablet at 11am, one tablet at 3pm, and one tablet at 7pm.  Dispense: 84 each; Refill: 0    2. PCOS (polycystic ovarian syndrome)  Assessment & Plan:  Low carb diet, regular physical activity, and weight reduction of 10-15%. Continue saxenda.             Follow Up   Return in about 5 weeks (around 5/17/2023) for Next scheduled follow up.  Patient was given instructions and counseling regarding her condition or for health maintenance advice. Please see specific information pulled into the AVS if appropriate.     ROCCO Calero

## 2023-04-12 NOTE — ASSESSMENT & PLAN NOTE
Patient's (Body mass index is 27.47 kg/m².) indicates that they are overweight with health conditions that include dyslipidemias and PCOS . Weight is improving with treatment. BMI is is above average; BMI management plan is completed. We discussed low calorie, low carb based diet program, portion control, increasing exercise, pharmacologic options including diethylpropion and saxenda and an kesha-based approach such as doxo Pal or Lose It.    I have instructed the patient to continue with pursuit of medical weight loss as a part of this program. Patient does meet criteria for use of anorectics at this time as BMI > 27 with coexisting, hyperlipidemia and is not at treatment goal.     Continue nutritional focus and work towards new exercise FITT goal of: 2-3-4-3.  The current plan for this month includes: continue current exercise efforts, weight loss goal 4-6lbs this month, continue to work on lifestyle behavioral changes and continue nutrition focus. Continue current medications.

## 2023-04-13 ENCOUNTER — PRIOR AUTHORIZATION (OUTPATIENT)
Dept: BARIATRICS/WEIGHT MGMT | Facility: CLINIC | Age: 43
End: 2023-04-13
Payer: COMMERCIAL

## 2023-04-13 NOTE — TELEPHONE ENCOUNTER
FRANCISCA SIFUENTES (Key: [id number]) - [id number]  Diethylpropion HCl 25MG tablets    APPROVED from 04/13/2023 to 07/13/2023.

## 2023-04-20 DIAGNOSIS — E66.3 OVERWEIGHT: ICD-10-CM

## 2023-04-20 RX ORDER — LIRAGLUTIDE 6 MG/ML
INJECTION, SOLUTION SUBCUTANEOUS
Qty: 15 ML | Refills: 2 | Status: SHIPPED | OUTPATIENT
Start: 2023-04-20

## 2023-04-21 ENCOUNTER — PRIOR AUTHORIZATION (OUTPATIENT)
Dept: BARIATRICS/WEIGHT MGMT | Facility: CLINIC | Age: 43
End: 2023-04-21
Payer: COMMERCIAL

## 2023-04-26 DIAGNOSIS — K21.9 GASTROESOPHAGEAL REFLUX DISEASE, UNSPECIFIED WHETHER ESOPHAGITIS PRESENT: Primary | ICD-10-CM

## 2023-04-26 RX ORDER — SODIUM CHLORIDE 0.9 % (FLUSH) 0.9 %
3 SYRINGE (ML) INJECTION EVERY 12 HOURS SCHEDULED
OUTPATIENT
Start: 2023-04-26

## 2023-04-26 RX ORDER — SODIUM CHLORIDE 9 MG/ML
150 INJECTION, SOLUTION INTRAVENOUS CONTINUOUS
OUTPATIENT
Start: 2023-04-26

## 2023-04-26 RX ORDER — SODIUM CHLORIDE 0.9 % (FLUSH) 0.9 %
3-10 SYRINGE (ML) INJECTION AS NEEDED
OUTPATIENT
Start: 2023-04-26

## 2023-04-26 RX ORDER — SODIUM CHLORIDE 9 MG/ML
40 INJECTION, SOLUTION INTRAVENOUS AS NEEDED
OUTPATIENT
Start: 2023-04-26

## 2023-05-01 ENCOUNTER — TELEMEDICINE (OUTPATIENT)
Dept: BARIATRICS/WEIGHT MGMT | Facility: CLINIC | Age: 43
End: 2023-05-01
Payer: COMMERCIAL

## 2023-05-01 VITALS — BODY MASS INDEX: 26.98 KG/M2 | HEIGHT: 68 IN | WEIGHT: 178 LBS

## 2023-05-01 DIAGNOSIS — K21.9 GASTROESOPHAGEAL REFLUX DISEASE, UNSPECIFIED WHETHER ESOPHAGITIS PRESENT: Primary | ICD-10-CM

## 2023-05-01 PROCEDURE — 99214 OFFICE O/P EST MOD 30 MIN: CPT | Performed by: PHYSICIAN ASSISTANT

## 2023-05-01 RX ORDER — PREDNISONE 10 MG/1
1 TABLET ORAL DAILY
COMMUNITY
Start: 2023-04-27

## 2023-05-01 NOTE — PROGRESS NOTES
"[facility]  [address]    [facility] [id number] [phone]        Patient Name:  Mariel Fox.  :  1980      Date of Visit: 2023      Reason for Visit:   GERD, nausea      HPI: Mariel Fox is a 43 y.o. female s/p LSG 20 w/ Dr. Ojeda    LOV 23 w/ Dr. Ojeda:  \"  Seeing Racheal.  Taking Saxenda and Tenuent.    Stopped due to nausea, reflux, switched to Welbutrin.  Racheal started on Prilosec bid.  Feels better now, but some residual reflux on bid PPI.  \"  UGI 23 @[facility] - unremarkable.  Planned EGD for further eval.     Today's update:  Continues on Saxenda + Tenuate per MWM.  Taking Prilosec OTC daily.      Nausea has improved.  Only has occasional issues w/ reflux now.  Denies vomiting/abd.pain.         Presurgery weight: 249 pounds.  Today's weight is 80.7 kg (178 lb) pounds, today's  Body mass index is 27.47 kg/m²., and weight loss since surgery is 71 pounds.      Past Medical History:   Diagnosis Date   • Anxiety and depression    • Chronic back pain     last steroid injection    • Dyspnea on exertion    • Endometriosis    • Fatigue    • Fatty liver     follows w/ GI ([place]), Mom w/ hx UNNES Cirrhosis, denies prior liver bx   • Hyperlipidemia    • Hypertension    • Melanoma in situ     (R) forearm, resected, no subsequet tx, follows w/ derm   • MTHFR mutation     hx miscarriage x 2, no hx DVT/PE, takes ASA81 mg qod   • Obesity    • PCOS (polycystic ovarian syndrome)     on Spironolactone   • Psoriatic arthritis     follows w/ Rheumatology ([place]), on Enbrel/Gabapentin + prn Ibupfroen     Past Surgical History:   Procedure Laterality Date   • D & C HYSTEROSCOPY      x 5   • ENDOSCOPY N/A 2020    Procedure: ESOPHAGOGASTRODUODENOSCOPY;  Surgeon: Shayy Ojeda MD;  Location: [facility];  Service: Bariatric;  Laterality: N/A;   • GASTRIC SLEEVE LAPAROSCOPIC N/A 2020    Procedure: GASTRIC SLEEVE " "LAPAROSCOPIC;  Surgeon: Shayy Ojeda MD;  Location: [facility];  Service: Bariatric;  Laterality: N/A;   • PLANTAR FASCIA SURGERY Left 01/19/2021   • SALPINGECTOMY Left 2013    ectopic pregnancy   • SKIN SURGERY      wide local excision right forearm melanoma in situ, also another for dysplastic nevus.   • TARSAL TUNNEL RELEASE Right 2015    (R) foot   • TONSILLECTOMY  1990     Outpatient Medications Marked as Taking for the 5/1/23 encounter (Telemedicine) with Ailyn Mobley PA   Medication Sig Dispense Refill   • diclofenac (VOLTAREN) 75 MG EC tablet Take 1 tablet by mouth Daily.     • Diethylpropion HCl 25 MG tablet Take one tablet at 11am, one tablet at 3pm, and one tablet at 7pm. 84 each 0   • etanercept (ENBREL) 50 MG/ML solution prefilled syringe injection 1 mL 1 (One) Time Per Week.     • levonorgestrel (Mirena, 52 MG,) 20 MCG/24HR IUD 1 each by Intrauterine route.     • multivitamin with minerals (Multivitamin Adults) tablet tablet Take 1 tablet by mouth Daily. 30 tablet 2   • omeprazole (priLOSEC) 20 MG capsule Take 1 capsule by mouth Daily.     • ondansetron (ZOFRAN) 8 MG tablet Take 1 tablet by mouth Every 8 (Eight) Hours As Needed for Nausea. 30 tablet 0   • Saxenda 18 MG/3ML injection pen INJECT 3MG UNDER THE SKIN DAILY 15 mL 2   • sertraline (ZOLOFT) 100 MG tablet Take 1.5 tablets by mouth Daily.     • simvastatin (ZOCOR) 20 MG tablet Take 1 tablet by mouth Every Night.  1       Allergies   Allergen Reactions   • Adhesive Tape Itching and Rash       Social History     Socioeconomic History   • Marital status:    Tobacco Use   • Smoking status: Never   • Smokeless tobacco: Never   Substance and Sexual Activity   • Alcohol use: No   • Drug use: No   • Sexual activity: Defer       Ht 171.5 cm (67.5\")   Wt 80.7 kg (178 lb)   BMI 27.47 kg/m²     Physical Exam  Constitutional:       General: She is not in acute distress.     Appearance: She is well-developed.   Eyes:      General: No " scleral icterus.  Pulmonary:      Effort: Pulmonary effort is normal.   Neurological:      Mental Status: She is alert and oriented to person, place, and time.           Assessment:  3 years s/p LSG 2/4/20 w/ Dr. Ojeda    ICD-10-CM ICD-9-CM   1. Gastroesophageal reflux disease, unspecified whether esophagitis present  K21.9 530.81         Plan:   EGD w/ Dr. Ojeda for further eval.  Risks/benefits discussed.  Hold Tenuate x 2 wks prior.  Hold Saxenda x 1 week prior.  Additional input to follow.         LAURA Mart       Note: This was an audio and video enabled telemedicine encounter conducted via [employer].  Patient is located in [place].  Provider is at her office.  Consent was obtained prior to the visit.

## 2023-05-01 NOTE — H&P (VIEW-ONLY)
"[facility] Bariatric Surgery  [address]    [facility] [id number] [phone]        Patient Name:  Mariel Fox.  :  1980      Date of Visit: 2023      Reason for Visit:   GERD, nausea      HPI: Mariel Fox is a 43 y.o. female s/p LSG 20 w/ Dr. Ojeda    LOV 23 w/ Dr. Ojeda:  \"  Seeing Racheal.  Taking Saxenda and Tenuent.    Stopped due to nausea, reflux, switched to Welbutrin.  Racheal started on Prilosec bid.  Feels better now, but some residual reflux on bid PPI.  \"  UGI 23 @[facility] - unremarkable.  Planned EGD for further eval.     Today's update:  Continues on Saxenda + Tenuate per MWM.  Taking Prilosec OTC daily.      Nausea has improved.  Only has occasional issues w/ reflux now.  Denies vomiting/abd.pain.         Presurgery weight: 249 pounds.  Today's weight is 80.7 kg (178 lb) pounds, today's  Body mass index is 27.47 kg/m²., and weight loss since surgery is 71 pounds.      Past Medical History:   Diagnosis Date   • Anxiety and depression    • Chronic back pain     last steroid injection    • Dyspnea on exertion    • Endometriosis    • Fatigue    • Fatty liver     follows w/ GI ([place]), Mom w/ hx NUNES Cirrhosis, denies prior liver bx   • Hyperlipidemia    • Hypertension    • Melanoma in situ     (R) forearm, resected, no subsequet tx, follows w/ derm   • MTHFR mutation     hx miscarriage x 2, no hx DVT/PE, takes ASA81 mg qod   • Obesity    • PCOS (polycystic ovarian syndrome)     on Spironolactone   • Psoriatic arthritis     follows w/ Rheumatology ([place]), on Enbrel/Gabapentin + prn Ibupfroen     Past Surgical History:   Procedure Laterality Date   • D & C HYSTEROSCOPY      x 5   • ENDOSCOPY N/A 2020    Procedure: ESOPHAGOGASTRODUODENOSCOPY;  Surgeon: Shayy Ojeda MD;  Location: [facility];  Service: Bariatric;  Laterality: N/A;   • GASTRIC SLEEVE LAPAROSCOPIC N/A 2020    Procedure: GASTRIC SLEEVE " "LAPAROSCOPIC;  Surgeon: Shayy Ojeda MD;  [facility]  Service: Bariatric;  Laterality: N/A;   • PLANTAR FASCIA SURGERY Left 01/19/2021   • SALPINGECTOMY Left 2013    ectopic pregnancy   • SKIN SURGERY      wide local excision right forearm melanoma in situ, also another for dysplastic nevus.   • TARSAL TUNNEL RELEASE Right 2015    (R) foot   • TONSILLECTOMY  1990     Outpatient Medications Marked as Taking for the 5/1/23 encounter (Telemedicine) with Ailyn Mobley PA   Medication Sig Dispense Refill   • diclofenac (VOLTAREN) 75 MG EC tablet Take 1 tablet by mouth Daily.     • Diethylpropion HCl 25 MG tablet Take one tablet at 11am, one tablet at 3pm, and one tablet at 7pm. 84 each 0   • etanercept (ENBREL) 50 MG/ML solution prefilled syringe injection 1 mL 1 (One) Time Per Week.     • levonorgestrel (Mirena, 52 MG,) 20 MCG/24HR IUD 1 each by Intrauterine route.     • multivitamin with minerals (Multivitamin Adults) tablet tablet Take 1 tablet by mouth Daily. 30 tablet 2   • omeprazole (priLOSEC) 20 MG capsule Take 1 capsule by mouth Daily.     • ondansetron (ZOFRAN) 8 MG tablet Take 1 tablet by mouth Every 8 (Eight) Hours As Needed for Nausea. 30 tablet 0   • Saxenda 18 MG/3ML injection pen INJECT 3MG UNDER THE SKIN DAILY 15 mL 2   • sertraline (ZOLOFT) 100 MG tablet Take 1.5 tablets by mouth Daily.     • simvastatin (ZOCOR) 20 MG tablet Take 1 tablet by mouth Every Night.  1       Allergies   Allergen Reactions   • Adhesive Tape Itching and Rash       Social History     Socioeconomic History   • Marital status:    Tobacco Use   • Smoking status: Never   • Smokeless tobacco: Never   Substance and Sexual Activity   • Alcohol use: No   • Drug use: No   • Sexual activity: Defer       Ht 171.5 cm (67.5\")   Wt 80.7 kg (178 lb)   BMI 27.47 kg/m²     Physical Exam  Constitutional:       General: She is not in acute distress.     Appearance: She is well-developed.   Eyes:      General: No " scleral icterus.  Pulmonary:      Effort: Pulmonary effort is normal.   Neurological:      Mental Status: She is alert and oriented to person, place, and time.           Assessment:  3 years s/p LSG 2/4/20 w/ Dr. Ojeda    ICD-10-CM ICD-9-CM   1. Gastroesophageal reflux disease, unspecified whether esophagitis present  K21.9 530.81         Plan:   EGD w/ Dr. Ojeda for further eval.  Risks/benefits discussed.  Hold Tenuate x 2 wks prior.  Hold Saxenda x 1 week prior.  Additional input to follow.         LAURA Mart       Note: This was an audio and video enabled telemedicine encounter conducted via [employer].  Patient is located in [place].  Provider is at her office.  Consent was obtained prior to the visit.

## 2023-05-12 ENCOUNTER — PRE-ADMISSION TESTING (OUTPATIENT)
Dept: PREADMISSION TESTING | Facility: HOSPITAL | Age: 43
End: 2023-05-12
Payer: COMMERCIAL

## 2023-05-12 LAB
DEPRECATED RDW RBC AUTO: 44.4 FL (ref 37–54)
ERYTHROCYTE [DISTWIDTH] IN BLOOD BY AUTOMATED COUNT: 12.8 % (ref 12.3–15.4)
HCT VFR BLD AUTO: 39.9 % (ref 34–46.6)
HGB BLD-MCNC: 13.2 G/DL (ref 12–15.9)
MCH RBC QN AUTO: 31.4 PG (ref 26.6–33)
MCHC RBC AUTO-ENTMCNC: 33.1 G/DL (ref 31.5–35.7)
MCV RBC AUTO: 95 FL (ref 79–97)
PLATELET # BLD AUTO: 308 10*3/MM3 (ref 140–450)
PMV BLD AUTO: 9.4 FL (ref 6–12)
QT INTERVAL: 420 MS
QTC INTERVAL: 426 MS
RBC # BLD AUTO: 4.2 10*6/MM3 (ref 3.77–5.28)
WBC NRBC COR # BLD: 8.57 10*3/MM3 (ref 3.4–10.8)

## 2023-05-12 PROCEDURE — 36415 COLL VENOUS BLD VENIPUNCTURE: CPT

## 2023-05-12 PROCEDURE — 85027 COMPLETE CBC AUTOMATED: CPT

## 2023-05-12 PROCEDURE — 93005 ELECTROCARDIOGRAM TRACING: CPT

## 2023-05-12 NOTE — PAT
Patient viewed general [facility] education video as instructed in their preoperative information received from their surgeon.  Patient stated the general [facility] education video was viewed in its entirety and survey completed.  Copies of [facility] general education handouts (Incentive Spirometry, Meds to Beds Program, Patient Belongings, Pre-op skin preparation instructions, Blood Glucose testing, Visitor policy, Surgery FAQ, Code H) distributed to patient if not printed. Education related to the PAT pass and skin preparation for surgery (if applicable) completed in [facility] as a reinforcement to PAT education video. Patient instructed to return PAT pass provided today as well as completed skin preparation sheet (if applicable) on the day of procedure.     Additionally if patient had not viewed video yet but intended to view it at home or in our waiting area, then referred them to the handout with QR code/link provided during PAT visit.  Instructed patient to complete survey after viewing the video in its entirety.  Encouraged patient/family to read [facility] general education handouts thoroughly and notify PAT staff with any questions or concerns. Patient verbalized understanding of all information and priority content.    Per Anesthesia Request, patient instructed not to take their ACE/ARB medications on the AM of surgery.    It was noted during Pre Admission Testing that patient was wearing some form of fingernail polish (gel/regular) and/or acrylic/artificial nails.  Patient was told that polish and/or artificial nails must be removed for surgery.  If a patient had recent manicure, and would rather not remove polish or artificial nails. Then the minimum requirement is that the polish/artificial nails must be removed from the middle finger on each hand.  Patient verbalized understanding.    If patient was having surgery on an upper extremity, then the patient was instructed that fingernail polish/artificial fingernails must be removed for  surgery.  NO EXCEPTIONS.  Patient verbalized understanding.    If patient was having surgery on a lower extremity, then the patient was instructed that toenail polish on both extremities must be removed for surgery.  NO EXCEPTIONS. Patient verbalized understanding.

## 2023-05-15 ENCOUNTER — ANESTHESIA EVENT (OUTPATIENT)
Dept: GASTROENTEROLOGY | Facility: HOSPITAL | Age: 43
End: 2023-05-15
Payer: COMMERCIAL

## 2023-05-15 ENCOUNTER — HOSPITAL ENCOUNTER (OUTPATIENT)
Facility: HOSPITAL | Age: 43
Setting detail: HOSPITAL OUTPATIENT SURGERY
Discharge: HOME | End: 2023-05-15
Attending: SURGERY | Admitting: SURGERY
Payer: COMMERCIAL

## 2023-05-15 ENCOUNTER — ANESTHESIA (OUTPATIENT)
Dept: GASTROENTEROLOGY | Facility: HOSPITAL | Age: 43
End: 2023-05-15
Payer: COMMERCIAL

## 2023-05-15 VITALS
TEMPERATURE: 97 F | OXYGEN SATURATION: 99 % | HEART RATE: 59 BPM | DIASTOLIC BLOOD PRESSURE: 80 MMHG | SYSTOLIC BLOOD PRESSURE: 118 MMHG | RESPIRATION RATE: 18 BRPM

## 2023-05-15 DIAGNOSIS — K21.9 GASTROESOPHAGEAL REFLUX DISEASE, UNSPECIFIED WHETHER ESOPHAGITIS PRESENT: ICD-10-CM

## 2023-05-15 LAB
B-HCG UR QL: NEGATIVE
EXPIRATION DATE: NORMAL
INTERNAL NEGATIVE CONTROL: NEGATIVE
INTERNAL POSITIVE CONTROL: POSITIVE
Lab: NORMAL

## 2023-05-15 PROCEDURE — 88305 TISSUE EXAM BY PATHOLOGIST: CPT | Performed by: SURGERY

## 2023-05-15 PROCEDURE — 43239 EGD BIOPSY SINGLE/MULTIPLE: CPT | Performed by: SURGERY

## 2023-05-15 PROCEDURE — 81025 URINE PREGNANCY TEST: CPT | Performed by: ANESTHESIOLOGY

## 2023-05-15 PROCEDURE — 25010000002 PROPOFOL 10 MG/ML EMULSION: Performed by: NURSE ANESTHETIST, CERTIFIED REGISTERED

## 2023-05-15 RX ORDER — SODIUM CHLORIDE 9 MG/ML
40 INJECTION, SOLUTION INTRAVENOUS AS NEEDED
Status: DISCONTINUED | OUTPATIENT
Start: 2023-05-15 | End: 2023-05-15 | Stop reason: HOSPADM

## 2023-05-15 RX ORDER — SODIUM CHLORIDE 9 MG/ML
150 INJECTION, SOLUTION INTRAVENOUS CONTINUOUS
Status: DISCONTINUED | OUTPATIENT
Start: 2023-05-15 | End: 2023-05-15 | Stop reason: HOSPADM

## 2023-05-15 RX ORDER — SODIUM CHLORIDE 0.9 % (FLUSH) 0.9 %
3 SYRINGE (ML) INJECTION EVERY 12 HOURS SCHEDULED
Status: DISCONTINUED | OUTPATIENT
Start: 2023-05-15 | End: 2023-05-15 | Stop reason: HOSPADM

## 2023-05-15 RX ORDER — FAMOTIDINE 20 MG/1
20 TABLET, FILM COATED ORAL ONCE
Status: DISCONTINUED | OUTPATIENT
Start: 2023-05-15 | End: 2023-05-15 | Stop reason: HOSPADM

## 2023-05-15 RX ORDER — PROPOFOL 10 MG/ML
VIAL (ML) INTRAVENOUS AS NEEDED
Status: DISCONTINUED | OUTPATIENT
Start: 2023-05-15 | End: 2023-05-15 | Stop reason: SURG

## 2023-05-15 RX ORDER — SODIUM CHLORIDE 0.9 % (FLUSH) 0.9 %
10 SYRINGE (ML) INJECTION AS NEEDED
Status: DISCONTINUED | OUTPATIENT
Start: 2023-05-15 | End: 2023-05-15 | Stop reason: HOSPADM

## 2023-05-15 RX ORDER — LIDOCAINE HYDROCHLORIDE 10 MG/ML
0.5 INJECTION, SOLUTION EPIDURAL; INFILTRATION; INTRACAUDAL; PERINEURAL ONCE AS NEEDED
Status: DISCONTINUED | OUTPATIENT
Start: 2023-05-15 | End: 2023-05-15 | Stop reason: HOSPADM

## 2023-05-15 RX ORDER — SODIUM CHLORIDE, SODIUM LACTATE, POTASSIUM CHLORIDE, CALCIUM CHLORIDE 600; 310; 30; 20 MG/100ML; MG/100ML; MG/100ML; MG/100ML
9 INJECTION, SOLUTION INTRAVENOUS CONTINUOUS
Status: DISCONTINUED | OUTPATIENT
Start: 2023-05-15 | End: 2023-05-15 | Stop reason: HOSPADM

## 2023-05-15 RX ORDER — IPRATROPIUM BROMIDE AND ALBUTEROL SULFATE 2.5; .5 MG/3ML; MG/3ML
3 SOLUTION RESPIRATORY (INHALATION) ONCE AS NEEDED
Status: DISCONTINUED | OUTPATIENT
Start: 2023-05-15 | End: 2023-05-15 | Stop reason: HOSPADM

## 2023-05-15 RX ORDER — ONDANSETRON 2 MG/ML
4 INJECTION INTRAMUSCULAR; INTRAVENOUS ONCE AS NEEDED
Status: DISCONTINUED | OUTPATIENT
Start: 2023-05-15 | End: 2023-05-15 | Stop reason: SDUPTHER

## 2023-05-15 RX ORDER — SODIUM CHLORIDE 0.9 % (FLUSH) 0.9 %
10 SYRINGE (ML) INJECTION EVERY 12 HOURS SCHEDULED
Status: DISCONTINUED | OUTPATIENT
Start: 2023-05-15 | End: 2023-05-15 | Stop reason: HOSPADM

## 2023-05-15 RX ORDER — LIDOCAINE HYDROCHLORIDE 10 MG/ML
INJECTION, SOLUTION EPIDURAL; INFILTRATION; INTRACAUDAL; PERINEURAL AS NEEDED
Status: DISCONTINUED | OUTPATIENT
Start: 2023-05-15 | End: 2023-05-15 | Stop reason: SURG

## 2023-05-15 RX ORDER — SODIUM CHLORIDE 0.9 % (FLUSH) 0.9 %
3-10 SYRINGE (ML) INJECTION AS NEEDED
Status: DISCONTINUED | OUTPATIENT
Start: 2023-05-15 | End: 2023-05-15 | Stop reason: HOSPADM

## 2023-05-15 RX ORDER — FAMOTIDINE 10 MG/ML
20 INJECTION, SOLUTION INTRAVENOUS ONCE
Status: COMPLETED | OUTPATIENT
Start: 2023-05-15 | End: 2023-05-15

## 2023-05-15 RX ORDER — ONDANSETRON 2 MG/ML
4 INJECTION INTRAMUSCULAR; INTRAVENOUS ONCE AS NEEDED
Status: DISCONTINUED | OUTPATIENT
Start: 2023-05-15 | End: 2023-05-15 | Stop reason: HOSPADM

## 2023-05-15 RX ADMIN — LIDOCAINE HYDROCHLORIDE 50 MG: 10 INJECTION, SOLUTION EPIDURAL; INFILTRATION; INTRACAUDAL; PERINEURAL at 10:44

## 2023-05-15 RX ADMIN — SODIUM CHLORIDE, POTASSIUM CHLORIDE, SODIUM LACTATE AND CALCIUM CHLORIDE: 600; 310; 30; 20 INJECTION, SOLUTION INTRAVENOUS at 10:38

## 2023-05-15 RX ADMIN — SODIUM CHLORIDE 500 ML: 9 INJECTION, SOLUTION INTRAVENOUS at 10:11

## 2023-05-15 RX ADMIN — FAMOTIDINE 20 MG: 10 INJECTION INTRAVENOUS at 10:11

## 2023-05-15 RX ADMIN — PROPOFOL 150 MG: 10 INJECTION, EMULSION INTRAVENOUS at 10:44

## 2023-05-15 NOTE — ANESTHESIA POSTPROCEDURE EVALUATION
Patient: Mariel Fox    Procedure Summary     Date: 05/15/23 Room / Location:  [facility]    Anesthesia Start: 1038 Anesthesia Stop: 1055    Procedure: ESOPHAGOGASTRODUODENOSCOPY WITH BIOPSY Diagnosis:       Gastroesophageal reflux disease, unspecified whether esophagitis present      (Gastroesophageal reflux disease, unspecified whether esophagitis present [K21.9])    Surgeons: Shayy Ojeda MD Provider: Giovanni Sharpe MD    Anesthesia Type: general, MAC ASA Status: 2          Anesthesia Type: general, MAC    Vitals  Vitals Value Taken Time   /80 05/15/23 1105   Temp 97 °F (36.1 °C) 05/15/23 1054   Pulse 59 05/15/23 1105   Resp 18 05/15/23 1054   SpO2 99 % 05/15/23 1105           Post Anesthesia Care and Evaluation      Comments: Post hoc VSS

## 2023-05-15 NOTE — ANESTHESIA POSTPROCEDURE EVALUATION
Patient: Mariel Fox    Procedure Summary     Date: 05/15/23 Room / Location:  [facility]    Anesthesia Start: 1038 Anesthesia Stop: 1055    Procedure: ESOPHAGOGASTRODUODENOSCOPY WITH BIOPSY Diagnosis:       Gastroesophageal reflux disease, unspecified whether esophagitis present      (Gastroesophageal reflux disease, unspecified whether esophagitis present [K21.9])    Surgeons: Shayy Ojeda MD Provider: Giovanni Sharpe MD    Anesthesia Type: general, MAC ASA Status: 2          Anesthesia Type: general, MAC    Vitals  Vitals Value Taken Time   /69 05/15/23 1054   Temp 97 °F (36.1 °C) 05/15/23 1054   Pulse 59 05/15/23 1054   Resp 18 05/15/23 1054   SpO2 96 % 05/15/23 1054           Post Anesthesia Care and Evaluation    Patient location during evaluation: PACU  Patient participation: complete - patient participated  Level of consciousness: awake and alert  Pain management: adequate    Airway patency: patent  Anesthetic complications: No anesthetic complications  PONV Status: none  Cardiovascular status: hemodynamically stable and acceptable  Respiratory status: nonlabored ventilation, acceptable and nasal cannula  Hydration status: acceptable

## 2023-05-15 NOTE — ANESTHESIA PREPROCEDURE EVALUATION
Anesthesia Evaluation     Patient summary reviewed and Nursing notes reviewed   NPO Solid Status: > 8 hours  NPO Liquid Status: > 2 hours           Airway   Mallampati: II  TM distance: >3 FB  No difficulty expected  Dental      Pulmonary    (-) asthma, shortness of breath (stable -denies now ), recent URI, sleep apnea, not a smoker  Cardiovascular     ECG reviewed    (+) hypertension, hyperlipidemia,   (-) past MI, dysrhythmias, angina, cardiac stents    ROS comment: ECG NSR     Neuro/Psych  (+) psychiatric history,    (-) seizures, CVA  GI/Hepatic/Renal/Endo    (+)  GERD,  liver disease fatty liver disease,   (-)  obesity, morbid obesity, no renal disease, diabetes, no thyroid disorder    ROS Comment: SP Gastric weight loos procedure     Musculoskeletal     Abdominal    Substance History      OB/GYN          Other   arthritis (on enbrel for psroriatic Arth),      (-) history of cancer (melanoma)      Phys Exam Other: retainer lower teeth                Anesthesia Plan    ASA 2     general and MAC     (PFL   No current nausea)  intravenous induction     Anesthetic plan, risks, benefits, and alternatives have been provided, discussed and informed consent has been obtained with: patient.    Plan discussed with CRNA.        CODE STATUS:

## 2023-05-15 NOTE — OP NOTE
PROCEDURE NOTE.    Date: 05/15/23    Patient: Mariel Fox     Surgeon: Shayy Ojeda MD      Preoperative Diagnosis: GERD     Postoperative Diagnosis: GERD     Procedure Performed: Esophagogastroduodenoscopy with biopsy (CPT 52983)    Estimated Blood Loss: minimal    Complications: none    Findings: Normal sleeve anatomy without twist or stricture.  No hiatal hernia, no bile in stomach.     Specimens: Distal esophageal (40 cm) and antral biopsies     Indications: Mariel Fox is a 43 y.o. year oldfemale      presents today for diagnostic endoscopy.       Procedure:      After informed consent was taken, the patient was brought to the operating room and placed in the supine position. MAC was given by anesthesia staff. A bite block was placed and time out performed. A flexible endoscope was passed transorally down to the second portion of the duodenum without difficulty. The scope was slowly withdrawn and the anatomy examined with photodocumentation throughout. The duodenum was normal. The pylorus was \without spasm. The sleeve was a narrow uniform tube without twist or stricture.  There was no bile in the stomach or esophagus.  The antrum of the stomach was without significant gastritis. A biopsy was taken to rule out H. Pylori. There was no abnormality and no hiatal hernia seen from this angle. The scope was withdrawn back into the esophagus after decompressing the stomach. The GE junction was at 41 cm and the distal esophagus showed no evidence of reflux esophagitis. Biopsy was taken. There was no hiatal hernia. The scope was further withdrawn. There was no other esophageal abnormality. The vocal cords were normal. The scope was withdrawn completely and the procedure concluded. The patient was awakened and taken to recovery in good condition.      Recommendations: We will discuss biopsy results at next office appointment.  Will also discuss medical optimization of reflux with PPI vs. Gastric  bypass.

## 2023-05-16 LAB
CYTO UR: NORMAL
LAB AP CASE REPORT: NORMAL
LAB AP CLINICAL INFORMATION: NORMAL
PATH REPORT.FINAL DX SPEC: NORMAL
PATH REPORT.GROSS SPEC: NORMAL

## 2023-05-22 ENCOUNTER — TELEMEDICINE (OUTPATIENT)
Dept: BARIATRICS/WEIGHT MGMT | Facility: CLINIC | Age: 43
End: 2023-05-22
Payer: COMMERCIAL

## 2023-05-22 DIAGNOSIS — K21.9 GASTROESOPHAGEAL REFLUX DISEASE, UNSPECIFIED WHETHER ESOPHAGITIS PRESENT: Primary | ICD-10-CM

## 2023-05-22 PROCEDURE — 99423 OL DIG E/M SVC 21+ MIN: CPT | Performed by: SURGERY

## 2023-05-22 RX ORDER — OMEPRAZOLE 40 MG/1
40 CAPSULE, DELAYED RELEASE ORAL DAILY
Qty: 30 CAPSULE | Refills: 11 | Status: SHIPPED | OUTPATIENT
Start: 2023-05-22 | End: 2024-05-21

## 2023-05-23 ENCOUNTER — TELEMEDICINE (OUTPATIENT)
Dept: BARIATRICS/WEIGHT MGMT | Facility: CLINIC | Age: 43
End: 2023-05-23
Payer: COMMERCIAL

## 2023-05-23 VITALS — BODY MASS INDEX: 26.98 KG/M2 | HEIGHT: 68 IN | WEIGHT: 178 LBS

## 2023-05-23 DIAGNOSIS — K21.00 GASTROESOPHAGEAL REFLUX DISEASE WITH ESOPHAGITIS WITHOUT HEMORRHAGE: ICD-10-CM

## 2023-05-23 DIAGNOSIS — E66.3 OVERWEIGHT (BMI 25.0-29.9): Primary | ICD-10-CM

## 2023-05-23 RX ORDER — DIETHYLPROPION HYDROCHLORIDE 25 MG/1
TABLET ORAL
Qty: 84 EACH | Refills: 0 | Status: SHIPPED | OUTPATIENT
Start: 2023-05-23

## 2023-05-23 NOTE — PROGRESS NOTES
"[facility] Bariatric Surgery  [address]    [facility] [id number] [phone]        Patient Name: Mariel Fox.  YOB: 1980      Date of Visit: 05/22/2023      Reason for Visit:  EGD f/u    HPI:  Mariel Fox is a 43 y.o. female s/p  LSG 2/4/20 w/ Dr. Ojeda      \"UGI 2/23/23 @[facility] - unremarkable.  Planned EGD for further eval.     Continues on Saxenda + Tenuate per MWM.\"    5/22/23 Update: pt has intermittent reflux that is incompletely controlled on Prilosec 20 mg daily.      5/15/23 EGD: finding, normal sleeve without twist or stricture.  No HH, no bile in stomach.      Bx:     1.  STOMACH, ANTRUM, BIOPSY:  Oxyntic-type mucosa with minimal chronic inactive inflammation and reactive gastropathic changes  Negative for Helicobacter-like organisms on routine stain  Negative for intestinal metaplasia and dysplasia    2.  ESOPHAGUS, DISTAL AT 40 CM, BIOPSY:  Squamous mucosa with features suggestive of reflux esophagitis  No gastric glandular mucosa present         Past Medical History:   Diagnosis Date   • Anxiety and depression    • Cancer     melanoma   • Chronic back pain     last steroid injection 2017   • Dyspnea on exertion    • Endometriosis    • Fatigue    • Fatty liver     follows w/ GI ([place]), Mom w/ hx NUNES Cirrhosis, denies prior liver bx   • GERD (gastroesophageal reflux disease)    • Hyperlipidemia    • Hypertension    • Melanoma in situ 2005    (R) forearm, resected, no subsequet tx, follows w/ derm   • MTHFR mutation     hx miscarriage x 2, no hx DVT/PE, takes ASA81 mg qod   • Obesity    • PCOS (polycystic ovarian syndrome)     on Spironolactone   • Presence of dental bridge     permanent retainer bottom   • Psoriatic arthritis     follows w/ Rheumatology ([place]), on Enbrel/Gabapentin + prn Ibupfroen     Past Surgical History:   Procedure Laterality Date   • D & C HYSTEROSCOPY      x 5   • ENDOSCOPY N/A 02/04/2020    Procedure: " ESOPHAGOGASTRODUODENOSCOPY;  Surgeon: Shayy Ojeda MD;  Location:  [facility];  Service: Bariatric;  Laterality: N/A;   • ENDOSCOPY N/A 5/15/2023    Procedure: ESOPHAGOGASTRODUODENOSCOPY WITH BIOPSY;  Surgeon: Shayy Ojeda MD;  Location:  [facility];  Service: General;  Laterality: N/A;   • GASTRIC SLEEVE LAPAROSCOPIC N/A 02/04/2020    Procedure: GASTRIC SLEEVE LAPAROSCOPIC;  Surgeon: Shayy Ojeda MD;  Location:  [facility];  Service: Bariatric;  Laterality: N/A;   • INTRAUTERINE DEVICE INSERTION      in place still   • PLANTAR FASCIA SURGERY Left 01/19/2021   • SALPINGECTOMY Left 2013    ectopic pregnancy   • SKIN SURGERY      wide local excision right forearm melanoma in situ, also another for dysplastic nevus.   • TARSAL TUNNEL RELEASE Right 2015    (R) foot   • TONSILLECTOMY  1990   • WISDOM TOOTH EXTRACTION       No outpatient medications have been marked as taking for the 5/22/23 encounter (Telemedicine) with Shayy Ojeda MD.     Allergies   Allergen Reactions   • Adhesive Tape Itching and Rash     Longer period of time irritation-     Paper tape is okay        Social History     Socioeconomic History   • Marital status:    Tobacco Use   • Smoking status: Never   • Smokeless tobacco: Never   Vaping Use   • Vaping Use: Never used   Substance and Sexual Activity   • Alcohol use: No   • Drug use: No   • Sexual activity: Defer       There were no vitals filed for this visit.  Weight    There is no height or weight on file to calculate BMI.    Physical Exam  Constitutional:       General: She is not in acute distress.     Appearance: Normal appearance. She is not ill-appearing.   HENT:      Head: Normocephalic and atraumatic.      Nose: Nose normal.   Eyes:      General: No scleral icterus.     Extraocular Movements: Extraocular movements intact.      Conjunctiva/sclera: Conjunctivae normal.      Pupils: Pupils are equal, round, and reactive to light.   Pulmonary:       Effort: Pulmonary effort is normal. No respiratory distress.   Skin:     Coloration: Skin is not pale.   Neurological:      Mental Status: She is alert and oriented to person, place, and time.   Psychiatric:         Mood and Affect: Mood normal.         Behavior: Behavior normal.           Assessment:      ICD-10-CM ICD-9-CM   1. Gastroesophageal reflux disease, unspecified whether esophagitis present  K21.9 530.81       Plan:      Increase Prilosec to 40 mg daily.  Discussed her options, including diagnostic laparoscopy to search for occult hiatal hernia, medical optimization, or gastric bypass.  She would like to pursue medical optimization with increasing PPI, and I think this is a good and safe option.    BMI is >= 25 and <30. (Overweight) The following options were offered after discussion;: exercise counseling/recommendations and nutrition counseling/recommendations      The patient was instructed to follow up in 2/2024 for yearly visit with labs, sooner if needed.     This visit was conducted as a video visit, in an effort to limit spread of the novel coronavirus during the COVID-19 pandemic.  The patient gave consent.

## 2023-05-23 NOTE — ASSESSMENT & PLAN NOTE
Patient's (Body mass index is 27.47 kg/m².) indicates that they are overweight with health conditions that include dyslipidemias, GERD, osteoarthritis and PCOS . Weight is unchanged. BMI is is above average; BMI management plan is completed. We discussed low calorie, low carb based diet program, portion control, increasing exercise, joining a fitness center or start home based exercise program, management of depression/anxiety/stress to control compensatory eating, pharmacologic options including diethylpropion, saxenda and an kesha-based approach such as [employer] Leaning Pal or Lose It.    I have instructed the patient to continue with pursuit of medical weight loss as a part of this program. Patient does meet criteria for use of anorectics at this time as BMI > 27 with coexisting, hyperlipidemia and is not at treatment goal.     Continue nutritional focus and work towards new exercise FITT goal of: 2-2-4-2.  The current plan for this month includes: continue current exercise efforts, weight loss goal 4-6lbs this month and continue nutrition focus. Restart diethylpropion and saxenda. If weight does not decrease in 2 weeks start food journal to verify macronutrient intake is in the recommended ranges.     Continue sympathomimetic (medication refilled).  This patient 1) has no evidence of serious cardiovascular disease; 2) does not have serious psychiatric disease or a history of substance abuse; 3) has been informed about weight loss medications that are FDA approved for long-term use and told that these have been all documented to be safe and effective whereas phentermine has not; 4) does not demonstrate a clinically significant increase in pulse or blood pressure when taking phentermine; and 5) demonstrate significant weight loss while using the medication.  Patient understands that all antiobesity medications are contraindicated in pregnancy.  Patient denies a history of glaucoma.  Patient understands that long-term use  "of phentermine is considered off label use of this medication, however, that the endocrine Society and recent research supports that long-term use of phentermine does not appear to have detrimental health effects when used and the appropriate patient.  In addition, a 2019 study published in an obesity journal on [id number] patient's concluded that \"recommendations to limit phentermine to less than 3 months do not align with current concept of pharmacologic treatment of obesity\", and that \"long-term phentermine users experience greater weight loss without apparent increases in cardiovascular risk\".    We reviewed potential side effects including insomnia, dry mouth, increased heart rate and blood pressure, increased anxiety.  We reviewed reducing caffeine consumption while taking phentermine.  especially if the patient is experience side effects.  The potential risk and benefits of phentermine have been reviewed with the patient, and alternative treatment options were discussed.  All questions were answered, and the patient wishes to move forward with this medication.       "

## 2023-08-17 ENCOUNTER — OFFICE VISIT (OUTPATIENT)
Dept: BARIATRICS/WEIGHT MGMT | Facility: CLINIC | Age: 43
End: 2023-08-17
Payer: COMMERCIAL

## 2023-08-17 VITALS
WEIGHT: 175.5 LBS | OXYGEN SATURATION: 98 % | BODY MASS INDEX: 26.6 KG/M2 | SYSTOLIC BLOOD PRESSURE: 118 MMHG | HEIGHT: 68 IN | HEART RATE: 73 BPM | DIASTOLIC BLOOD PRESSURE: 60 MMHG

## 2023-08-17 DIAGNOSIS — R45.4 IRRITABILITY: ICD-10-CM

## 2023-08-17 DIAGNOSIS — E78.2 MIXED HYPERLIPIDEMIA: ICD-10-CM

## 2023-08-17 DIAGNOSIS — E66.3 OVERWEIGHT (BMI 25.0-29.9): Primary | ICD-10-CM

## 2023-08-17 RX ORDER — MELATONIN 3 MG
TABLET ORAL
Qty: 90 EACH | Refills: 0
Start: 2023-08-17

## 2023-08-17 RX ORDER — SPIRONOLACTONE 100 MG/1
1 TABLET, FILM COATED ORAL EVERY 12 HOURS SCHEDULED
COMMUNITY
Start: 2023-07-27

## 2023-08-17 RX ORDER — BETAMETHASONE DIPROPIONATE 0.5 MG/G
CREAM TOPICAL
COMMUNITY
Start: 2023-08-11

## 2023-08-17 RX ORDER — CERTOLIZUMAB PEGOL 200 MG/ML
INJECTION, SOLUTION SUBCUTANEOUS
COMMUNITY
Start: 2023-08-07

## 2023-08-17 RX ORDER — PHENTERMINE HYDROCHLORIDE 8 MG/1
8 TABLET ORAL 3 TIMES DAILY
Qty: 90 EACH | Refills: 0 | Status: SHIPPED | OUTPATIENT
Start: 2023-08-17

## 2023-08-17 RX ORDER — GABAPENTIN 300 MG/1
1 CAPSULE ORAL 3 TIMES DAILY
COMMUNITY
Start: 2023-07-27

## 2023-08-17 RX ORDER — DULOXETIN HYDROCHLORIDE 30 MG/1
1 CAPSULE, DELAYED RELEASE ORAL DAILY
COMMUNITY
Start: 2023-07-27

## 2023-08-17 NOTE — ASSESSMENT & PLAN NOTE
Patient's (Body mass index is 27.08 kg/mý.) indicates that they are overweight with health conditions that include dyslipidemias, GERD, and PCOS  . Weight is improving with treatment. BMI is is above average; BMI management plan is completed. We discussed low calorie, low carb based diet program, portion control, increasing exercise, management of depression/anxiety/stress to control compensatory eating, pharmacologic options including phentermine, and an kesha-based approach such as [employer] Pal or Lose It.     I have instructed the patient to continue with pursuit of medical weight loss as a part of this program. Patient does meet criteria for use of anorectics at this time as BMI > 27 with coexisting and is not at treatment goal.     Continue nutritional focus and work towards new exercise FITT goal of: 2-2-4-2.  The current plan for this month includes: continue current exercise efforts and continue nutrition focus. Change phetermine to 8mg tid due to irritability and insomnia with 37.5mg dose. Add 5HTP to help ease insomnia and irritability.  Treatment goal 165lb.     Consider metformin if weight loss slower than expected.

## 2023-08-17 NOTE — PROGRESS NOTES
[facility]  [address]  [place] [address]     Office Note      Date: 2023  Patient Name: Mariel Fox  MRN: [id number]  : 1980  Subjective  Subjective     Chief Complaint  Obesity Management follow-up          Mariel Fox presents to [facility] MANAGEMENT for obesity management. LSG 2020 with Dr. Ojeda. Presurgery weight: 249 pounds, goal 165lb.  Patient is satisfied with weight loss progress. Appetite is moderately controlled. Reports irritability with phentermine. The patient is taking multivitamin and is not taking fish oil.  The patient is using a food journal, she continues to hit her macronutrient goals.    The patient is exercising with a FITT score of:    Frequency Intensity Time Strength Training   []   0, none []   0 []   0 []   0   []   1 (1-2x/week) [x]   1 (light) []   1 (<10 min) []   1 (1x/week)   [x]   2 (3-5x/week) []   2 (moderate) []   2 (10-20 min) []   2 (2x/week)   []   3 (daily) []   3 (moderately hard)  []   4 (very hard) []   3 (20-30 min)  [x]   4 (>30 min) [x]   3 (3-4x/week)     Review of Systems   Constitutional:  Positive for appetite change. Negative for fatigue.   Eyes:  Negative for blurred vision, double vision and visual disturbance.   Cardiovascular:  Negative for chest pain and palpitations.   Gastrointestinal:  Negative for abdominal pain, constipation, diarrhea, nausea, vomiting and GERD.   Endocrine: Positive for polydipsia. Negative for polyphagia and polyuria.   Musculoskeletal:  Negative for arthralgias, back pain and myalgias.   Neurological:  Negative for dizziness, tremors, light-headedness, headache and memory problem.        Parasthesias negative   Psychiatric/Behavioral:  Positive for sleep disturbance and stress. Negative for depressed mood. The patient is nervous/anxious.      Objective   Start weight: 186 pounds.    Total Loss lb/%Loss of beginning body weight  "(BBW): -10.5lb/-5.65%  Change in weight since last visit: +4    Body mass index is 27.08 kg/mý.   Body composition analysis completed and showed:   %body fat: 41.2  Measurements (in inches)  Waist: 32    Vital Signs:   /60   Pulse 73   Ht 171.5 cm (67.5\")   Wt 79.6 kg (175 lb 8 oz)   SpO2 98%   BMI 27.08 kg/mý     Physical Exam   General appears stated age and normal appearance   HEENT PERRLA, EOM intact, and conjunctivae normal   Chest/lungs Normal rate, Regular rhythm, Breathing is unlabored, and Clear to auscultation bilaterally   Extremities without edema   Neuro Good historian and No focal deficit   Skin Warm, dry, intact   Psych normal behavior, normal thought content, and normal concentration     Result Review :                Assessment / Plan        Diagnoses and all orders for this visit:    1. Overweight (BMI 25.0-29.9) (Primary)  Assessment & Plan:  Patient's (Body mass index is 27.08 kg/mý.) indicates that they are overweight with health conditions that include dyslipidemias, GERD, and PCOS  . Weight is improving with treatment. BMI is is above average; BMI management plan is completed. We discussed low calorie, low carb based diet program, portion control, increasing exercise, management of depression/anxiety/stress to control compensatory eating, pharmacologic options including phentermine, and an kesha-based approach such as Carefx Pal or Lose It.     I have instructed the patient to continue with pursuit of medical weight loss as a part of this program. Patient does meet criteria for use of anorectics at this time as BMI > 27 with coexisting and is not at treatment goal.     Continue nutritional focus and work towards new exercise FITT goal of: 2-2-4-2.  The current plan for this month includes: continue current exercise efforts and continue nutrition focus. Change phetermine to 8mg tid due to irritability and insomnia with 37.5mg dose. Add 5HTP to help ease insomnia and irritability.  " Treatment goal 165lb.     Consider metformin if weight loss slower than expected.         Orders:  -     Phentermine HCl (Lomaira) 8 MG tablet; Take 8 mg by mouth 3 (Three) Times a Day. As directed  Dispense: 90 each; Refill: 0  -     5-Hydroxytryptophan (5-HTP) 50 MG capsule; Take up to 6 sprays a day under the tongue (hold under tongue to 20 seconds before swallowing).  Dispense: 90 each; Refill: 0    2. Mixed hyperlipidemia  Assessment & Plan:  Continue healthy lifestyle changes. On statin with PCP. Repeat labwork in 6 months.       3. Irritability  Assessment & Plan:  Side effect of phentermine. Decrease dose. Add 5htp.             Follow Up   Return in about 1 month (around 9/17/2023) for Next scheduled follow up.  Patient was given instructions and counseling regarding her condition or for health maintenance advice. Please see specific information pulled into the AVS if appropriate.     Racheal Gonsalez, ROCCO  08/17/2023

## 2023-08-17 NOTE — PROGRESS NOTES
[facility]  [address]  [place] [address]     Office Note      Date: 2023  Patient Name: Mariel Fox  MRN: [id number]  : 1980  Subjective  Subjective     Chief Complaint  Obesity Management follow-up     {Problem List  Visit Diagnosis   Encounters  Notes  Medications  Labs  Result Review Imaging  Media :23}     Mareil Fox presents to [facility]NAGEMENT for obesity management.   Patient is satisfied with weight loss progress. Appetite is moderately controlled. Reports no side effects of prescribed medications today. The patient is taking multivitamin and is not taking fish oil.  The patient is using a food journal.    The patient is exercising with a FITT score of:    Frequency Intensity Time Strength Training   []   0, none []   0 []   0 []   0   []   1 (1-2x/week) [x]   1 (light) []   1 (<10 min) []   1 (1x/week)   [x]   2 (3-5x/week) []   2 (moderate) []   2 (10-20 min) []   2 (2x/week)   []   3 (daily) []   3 (moderately hard)  []   4 (very hard) []   3 (20-30 min)  [x]   4 (>30 min) [x]   3 (3-4x/week)     Review of Systems   Constitutional:  Positive for appetite change. Negative for fatigue.   Eyes:  Negative for blurred vision, double vision and visual disturbance.   Cardiovascular:  Negative for chest pain and palpitations.   Gastrointestinal:  Negative for abdominal pain, constipation, diarrhea, nausea, vomiting and GERD.   Endocrine: Positive for polydipsia. Negative for polyphagia and polyuria.   Musculoskeletal:  Negative for arthralgias, back pain and myalgias.   Neurological:  Negative for dizziness, tremors, light-headedness, headache and memory problem.        Parasthesias negative   Psychiatric/Behavioral:  Positive for sleep disturbance and stress. Negative for depressed mood. The patient is nervous/anxious.      Objective   Start weight: 186 pounds.    Total Loss lb/%Loss of beginning body  "weight (BBW): -10.5lb/-5.65%  Change in weight since last visit: +4    Body mass index is 27.08 kg/mý.   Body composition analysis completed and showed:   %body fat: 41.2  Measurements (in inches)  Waist: 32    Vital Signs:   /60   Pulse 73   Ht 171.5 cm (67.5\")   Wt 79.6 kg (175 lb 8 oz)   SpO2 98%   BMI 27.08 kg/mý     Physical Exam   General {NewPTPEGen:20422::\"appears stated age\",\"normal appearance\"}   HEENT {NewPTPEHEENT:05090::\"PERRLA\",\"EOM intact\",\"conjunctivae normal\",\"***\"}   Chest/lungs {NewPTPECARDIO/PULM:92018::\"Normal rate\",\"Regular rhythm\",\"Breathing is unlabored\",\"Clear to auscultation bilaterally\"}   Extremities {NewPTPEExt:49873::\"without edema\"}   Neuro {NewPTPENeuro:38349::\"Good historian\",\"No focal deficit\"}   Skin {NewPtPESkin:31170::\"Warm, dry, intact\"}   Psych {NewPTPEPsych:71463::\"normal behavior\",\"normal thought content\",\"normal concentration\"}     Result Review :{Labs  Result Review  Imaging  Med Tab  Media :23}   {The following data was reviewed by (Optional):05734}  {Ambulatory Labs (Optional):74553}           Assessment / Plan     {CC Problem List  Visit Diagnosis  ROS  Review (Popup)  Health Maintenance  Quality  BestPractice  Medications  SmartSets  SnapShot Encounters  Media :23}   There are no diagnoses linked to this encounter.    {Time Spent (Optional):03584}  Follow Up {Instructions Charge Capture  Follow-up Communications :23}  No follow-ups on file.  Patient was given instructions and counseling regarding her condition or for health maintenance advice. Please see specific information pulled into the AVS if appropriate.     Racheal Gonsalez, APRN  08/17/2023   "

## 2023-09-15 DIAGNOSIS — E66.3 OVERWEIGHT (BMI 25.0-29.9): ICD-10-CM

## 2023-09-18 RX ORDER — PHENTERMINE HYDROCHLORIDE 8 MG/1
TABLET ORAL
Qty: 30 EACH | Refills: 0 | Status: SHIPPED | OUTPATIENT
Start: 2023-09-18 | End: 2023-09-19 | Stop reason: SDUPTHER

## 2023-09-19 ENCOUNTER — TELEMEDICINE (OUTPATIENT)
Dept: BARIATRICS/WEIGHT MGMT | Facility: CLINIC | Age: 43
End: 2023-09-19
Payer: COMMERCIAL

## 2023-09-19 VITALS — HEIGHT: 68 IN | WEIGHT: 175 LBS | BODY MASS INDEX: 26.52 KG/M2

## 2023-09-19 DIAGNOSIS — E28.2 PCOS (POLYCYSTIC OVARIAN SYNDROME): ICD-10-CM

## 2023-09-19 DIAGNOSIS — E66.3 OVERWEIGHT (BMI 25.0-29.9): Primary | ICD-10-CM

## 2023-09-19 PROCEDURE — 99214 OFFICE O/P EST MOD 30 MIN: CPT | Performed by: NURSE PRACTITIONER

## 2023-09-19 RX ORDER — PHENTERMINE HYDROCHLORIDE 8 MG/1
1 TABLET ORAL 3 TIMES DAILY
Qty: 90 EACH | Refills: 0 | Status: SHIPPED | OUTPATIENT
Start: 2023-09-19

## 2023-09-19 RX ORDER — NALTREXONE HYDROCHLORIDE 50 MG/1
TABLET, FILM COATED ORAL
Qty: 30 TABLET | Refills: 2 | Status: SHIPPED | OUTPATIENT
Start: 2023-09-19

## 2023-09-19 RX ORDER — METFORMIN HYDROCHLORIDE 500 MG/1
TABLET, EXTENDED RELEASE ORAL
Qty: 60 TABLET | Refills: 2 | Status: SHIPPED | OUTPATIENT
Start: 2023-09-19

## 2023-09-19 RX ORDER — DULOXETIN HYDROCHLORIDE 60 MG/1
60 CAPSULE, DELAYED RELEASE ORAL DAILY
COMMUNITY
Start: 2023-09-15

## 2023-09-19 NOTE — PROGRESS NOTES
"     Office Note      Date: 2023  Patient Name: Mariel Fox  MRN: [id number]  : 1980    This service was conducted via [employer].  Patient is located at her work address.  Provider is located at her work address. The use of a video visit has been reviewed with the patient and informed consent has been obtained.  Subjective  Subjective     Chief Complaint  Obesity Management follow-up    Subjective          Mariel Fox presents to [facility]NA[facility]EMENT via telehealth for obesity management. LSG 2020 with Dr. Ojeda. Presurgery weight: 249 pounds, goal 165lb.    Patient is unsure with weight loss progress. Her weight was 170lb last week. Had some out of town trainings and was more sedentary this week. Appetite is poorly controlled and moderately controlled. She is more stressed, her daughter is having brain surgery Saturday, stress eating more. Reports no side effects of prescribed medications today. She is not keeping a food journal at this time  Yesterday's intake (\"bad day\"- was up early for doctors appointments and didn't get home until late):  B: built bar and coffee  L 2pm: [employer] cheeseburger meal (1 of the buns) and 5 fries, 1 scoop of vanilla custard  S: white cheddar popcorn and sprees  D: pepperoni and shredded cheese  S: grapes and cheese  Diet soft drinks  Today:  B: built bar and coffee with collagen  L: personal pizza (3/4 of it)  D: Low carb wrap, taco meat and cheese  Had mouth surgery yesterday.     Review of Systems   Constitutional:  Negative for appetite change and fatigue.   Eyes:  Negative for blurred vision, double vision and visual disturbance.   Cardiovascular:  Negative for chest pain and palpitations.   Gastrointestinal:  Negative for abdominal pain, constipation, diarrhea, nausea, vomiting and GERD.   Endocrine: Negative for polydipsia, polyphagia and polyuria.   Musculoskeletal:  Negative for arthralgias, back pain and myalgias. " "  Neurological:  Negative for dizziness, tremors, light-headedness, headache and memory problem.        Parasthesias negative   Psychiatric/Behavioral:  Positive for stress. Negative for sleep disturbance and depressed mood. The patient is nervous/anxious.        Objective   Body mass index is 27 kg/m².  Start weight MWM: 186 pounds.    Total weight loss: -11 pounds/-5.9%  Change in weight since last visit: none    Ht 171.5 cm (67.5\")   Wt 79.4 kg (175 lb)   BMI 27.00 kg/m²       Result Review :                 Assessment and Plan    Diagnoses and all orders for this visit:    1. Overweight (BMI 25.0-29.9) (Primary)  Assessment & Plan:  Patient's (Body mass index is 27 kg/m².) indicates that they are overweight with health conditions that include dyslipidemias, GERD, and PCOS  . Weight is unchanged. BMI is is above average; BMI management plan is completed. We discussed low calorie, low carb based diet program, portion control, increasing exercise, management of depression/anxiety/stress to control compensatory eating, pharmacologic options including lomaira, naltrexone, metformin, and an kesha-based approach such as Scaffold Pal or Lose It.     I have instructed the patient to continue with pursuit of medical weight loss as a part of this program. Patient does meet criteria for use of anorectics at this time as BMI > 27 with coexisting and GERD, PCOS .     Continue nutritional focus and work towards new exercise FITT goal of: 2-2-4-2.  The current plan for this month includes:   - Continue current exercise efforts  - Weight loss goal 4-6lbs this month  - Continue to prioritize protein, fiber, and hydration.   - Continue current medications, add metformin. Discussed how stress causes insulin resistance and also that the increase in duloxetine may contribute to ^cravings and weight gain and metformin has been shown to help counteract that.   - Treatment goal 165lb.       Orders:  -     Phentermine HCl (Lomaira) 8 MG " tablet; Take 1 tablet by mouth 3 (Three) Times a Day.  Dispense: 90 each; Refill: 0  -     naltrexone (DEPADE) 50 MG tablet; Take 1/2 tablet by mouth twice daily.  Dispense: 30 tablet; Refill: 2  -     metFORMIN ER (GLUCOPHAGE-XR) 500 MG 24 hr tablet; Take 1 tablet by mouth daily with dinner or bedtime for one week, then increase to 2 tablets by mouth daily with a meal.  Dispense: 60 tablet; Refill: 2    2. PCOS (polycystic ovarian syndrome)  Assessment & Plan:  Unchanged. Start metformin which treats PCOS, obesity, and insulin resistance. Tolerated well previously.     Orders:  -     metFORMIN ER (GLUCOPHAGE-XR) 500 MG 24 hr tablet; Take 1 tablet by mouth daily with dinner or bedtime for one week, then increase to 2 tablets by mouth daily with a meal.  Dispense: 60 tablet; Refill: 2        Follow Up   Return in about 1 month (around 10/19/2023) for Next scheduled follow up.  Patient was given instructions and counseling regarding her condition or for health maintenance advice. Please see specific information pulled into the AVS if appropriate.     ROCCO Calero

## 2023-09-19 NOTE — ASSESSMENT & PLAN NOTE
Unchanged. Start metformin which treats PCOS, obesity, and insulin resistance. Tolerated well previously.

## 2023-09-19 NOTE — ASSESSMENT & PLAN NOTE
Patient's depression is recurrent and is moderate without psychosis. Their depression and anxiety is currently active and the condition is worsening. This will be reassessed at the next regular appointment. F/U as described:patient depression is being managed by their pcp- cymbalta was increased. We discussed this can increase weight and hunger, will start metformin to counteract.

## 2023-09-19 NOTE — ASSESSMENT & PLAN NOTE
Patient's (Body mass index is 27 kg/m².) indicates that they are overweight with health conditions that include dyslipidemias, GERD, and PCOS  . Weight is unchanged. BMI is is above average; BMI management plan is completed. We discussed low calorie, low carb based diet program, portion control, increasing exercise, management of depression/anxiety/stress to control compensatory eating, pharmacologic options including lomaira, naltrexone, metformin, and an kesha-based approach such as [employer] Pal or Lose It.     I have instructed the patient to continue with pursuit of medical weight loss as a part of this program. Patient does meet criteria for use of anorectics at this time as BMI > 27 with coexisting and GERD, PCOS .     Continue nutritional focus and work towards new exercise FITT goal of: 2-2-4-2.  The current plan for this month includes:   - Continue current exercise efforts  - Weight loss goal 4-6lbs this month  - Continue to prioritize protein, fiber, and hydration.   - Continue current medications, add metformin. Discussed how stress causes insulin resistance and also that the increase in duloxetine may contribute to ^cravings and weight gain and metformin has been shown to help counteract that.   - Treatment goal 165lb.

## 2023-10-24 ENCOUNTER — TELEMEDICINE (OUTPATIENT)
Dept: BARIATRICS/WEIGHT MGMT | Facility: CLINIC | Age: 43
End: 2023-10-24
Payer: COMMERCIAL

## 2023-10-24 VITALS — HEIGHT: 68 IN | BODY MASS INDEX: 26.19 KG/M2 | WEIGHT: 172.8 LBS

## 2023-10-24 DIAGNOSIS — E66.3 OVERWEIGHT (BMI 25.0-29.9): Primary | ICD-10-CM

## 2023-10-24 DIAGNOSIS — R11.0 NAUSEA: ICD-10-CM

## 2023-10-24 PROCEDURE — 99214 OFFICE O/P EST MOD 30 MIN: CPT | Performed by: NURSE PRACTITIONER

## 2023-10-24 NOTE — ASSESSMENT & PLAN NOTE
Patient's (Body mass index is 26.66 kg/m².) indicates that they are overweight with health conditions that include dyslipidemias, GERD, and PCOS  . Weight is improving with treatment. BMI is is above average; BMI management plan is completed. We discussed low calorie, low carb based diet program, portion control, increasing exercise, and pharmacologic options including phentermine, metformin .    I have instructed the patient to continue with pursuit of medical weight loss as a part of this program. Patient does meet criteria for use of anorectics at this time as BMI >25 with , hyperlipidemia, and is not at treatment goal.     The current plan for this month includes:   - Continue current exercise efforts  - Continue nutrition focus with consistent meal planning.   - Try to increase metformin back to 2 tablets at bedtime. Taking with a meal is not realistic, is rarely in the same place in the evenings.   - Restart lomaira three times a day, will send refill on 11/09 which is 30 days from last dispense.   - Treatment goal 165lb.     Continue sympathomimetic (medication refilled).  This patient 1) has no evidence of serious cardiovascular disease; 2) does not have serious psychiatric disease or a history of substance abuse; 3) has been informed about weight loss medications that are FDA approved for long-term use and told that these have been all documented to be safe and effective whereas phentermine has not; 4) does not demonstrate a clinically significant increase in pulse or blood pressure when taking phentermine; and 5) demonstrate significant weight loss while using the medication.  Patient understands that all antiobesity medications are contraindicated in pregnancy.  Patient denies a history of glaucoma.  Patient understands that long-term use of phentermine is considered off label use of this medication, however, that the endocrine Society and recent research supports that long-term use of phentermine does  "not appear to have detrimental health effects when used and the appropriate patient.  In addition, a 2019 study published in an obesity journal on 13,972 patient's concluded that \"recommendations to limit phentermine to less than 3 months do not align with current concept of pharmacologic treatment of obesity\", and that \"long-term phentermine users experience greater weight loss without apparent increases in cardiovascular risk\".    We reviewed potential side effects including insomnia, dry mouth, increased heart rate and blood pressure, increased anxiety.  We reviewed reducing caffeine consumption while taking phentermine.  especially if the patient is experience side effects.  The potential risk and benefits of phentermine have been reviewed with the patient, and alternative treatment options were discussed.  All questions were answered, and the patient wishes to move forward with this medication.         "

## 2023-10-24 NOTE — ASSESSMENT & PLAN NOTE
Newly identified, may be related to metformin but more likely related to multivitamin. Try multivitamin gummies or patches. May need to choose vitamin without iron (is also more constipated).

## 2023-10-24 NOTE — PROGRESS NOTES
Office Note      Date: 10/24/2023  Patient Name: Mariel Fox  MRN: [id number]  : 1980    This service was conducted via [employer].  Patient is located in [place].  Provider is located at her work address. The use of a video visit has been reviewed with the patient and informed consent has been obtained.  Subjective  Subjective     Chief Complaint  Obesity Management follow-up    Subjective          Mariel Fox presents to [facility] WEIGHT MANAGEMENT via telehealth for obesity management. LSG 2020 with Dr. Ojeda. Presurgery weight: 249 pounds, goal 165lb.    Patient is satisfied with weight loss progress. Appetite is well controlled. Having strong cravings the last 4 days (hormone changes). Reports constant nausea since adding metformin (also added back multivitamin). Decreased metformin back to 1 tablet at bedtime and thinks that helped. Decreased phentermine to two a day because 30 pills were dispensed instead of 90. No other side effects of prescribed medications today.   The patient is exercising with a FITT score of: 2-2-4-2.  Review of Systems   Constitutional:  Negative for appetite change and fatigue.   Eyes:  Negative for blurred vision, double vision and visual disturbance.   Cardiovascular:  Negative for chest pain and palpitations.   Gastrointestinal:  Positive for constipation and nausea. Negative for abdominal pain, diarrhea, vomiting and GERD.   Endocrine: Negative for polydipsia, polyphagia and polyuria.   Musculoskeletal:  Negative for arthralgias, back pain and myalgias.   Neurological:  Negative for dizziness, tremors, light-headedness, headache and memory problem.        Parasthesias negative   Psychiatric/Behavioral:  Negative for sleep disturbance, depressed mood and stress. The patient is not nervous/anxious.          Objective   Start weight MWM: 186 pounds.    Total weight loss: -14 pounds/-7.5%  Change in weight since last visit: -3lb  Waist:  "29.5\"  Body mass index is 26.66 kg/m².   Measurements (in inches)     Ht 171.5 cm (67.5\")   Wt 78.4 kg (172 lb 12.8 oz)   BMI 26.66 kg/m²       Result Review :                 Assessment and Plan    Diagnoses and all orders for this visit:    1. Overweight (BMI 25.0-29.9) (Primary)  Assessment & Plan:  Patient's (Body mass index is 26.66 kg/m².) indicates that they are overweight with health conditions that include dyslipidemias, GERD, and PCOS  . Weight is improving with treatment. BMI is is above average; BMI management plan is completed. We discussed low calorie, low carb based diet program, portion control, increasing exercise, and pharmacologic options including phentermine, metformin .    I have instructed the patient to continue with pursuit of medical weight loss as a part of this program. Patient does meet criteria for use of anorectics at this time as BMI >25 with , hyperlipidemia, and is not at treatment goal.     The current plan for this month includes:   - Continue current exercise efforts  - Continue nutrition focus with consistent meal planning.   - Try to increase metformin back to 2 tablets at bedtime. Taking with a meal is not realistic, is rarely in the same place in the evenings.   - Restart lomaira three times a day, will send refill on 11/09 which is 30 days from last dispense.   - Treatment goal 165lb.     Continue sympathomimetic (medication refilled).  This patient 1) has no evidence of serious cardiovascular disease; 2) does not have serious psychiatric disease or a history of substance abuse; 3) has been informed about weight loss medications that are FDA approved for long-term use and told that these have been all documented to be safe and effective whereas phentermine has not; 4) does not demonstrate a clinically significant increase in pulse or blood pressure when taking phentermine; and 5) demonstrate significant weight loss while using the medication.  Patient understands that all " "antiobesity medications are contraindicated in pregnancy.  Patient denies a history of glaucoma.  Patient understands that long-term use of phentermine is considered off label use of this medication, however, that the endocrine Society and recent research supports that long-term use of phentermine does not appear to have detrimental health effects when used and the appropriate patient.  In addition, a 2019 study published in an obesity journal on [id number] patient's concluded that \"recommendations to limit phentermine to less than 3 months do not align with current concept of pharmacologic treatment of obesity\", and that \"long-term phentermine users experience greater weight loss without apparent increases in cardiovascular risk\".    We reviewed potential side effects including insomnia, dry mouth, increased heart rate and blood pressure, increased anxiety.  We reviewed reducing caffeine consumption while taking phentermine.  especially if the patient is experience side effects.  The potential risk and benefits of phentermine have been reviewed with the patient, and alternative treatment options were discussed.  All questions were answered, and the patient wishes to move forward with this medication.             2. Nausea  Assessment & Plan:  Newly identified, may be related to metformin but more likely related to multivitamin. Try multivitamin gummies or patches. May need to choose vitamin without iron (is also more constipated).             Follow Up   Return in about 1 month (around 11/24/2023) for Next scheduled follow up in office.  Patient was given instructions and counseling regarding her condition or for health maintenance advice. Please see specific information pulled into the AVS if appropriate.     ROCCO Calero    "

## 2023-11-07 DIAGNOSIS — E66.3 OVERWEIGHT (BMI 25.0-29.9): ICD-10-CM

## 2023-11-07 NOTE — TELEPHONE ENCOUNTER
Rx Refill Note  Requested Prescriptions     Pending Prescriptions Disp Refills    Lomaira 8 MG tablet [Pharmacy Med Name: Lomaira 8 mg tablet]  0     Sig: TAKE ONE TABLET BY MOUTH THREE TIMES DAILY      Last office visit with prescribing clinician: 8/17/2023   Last telemedicine visit with prescribing clinician: 10/24/2023   Next office visit with prescribing clinician: 12/7/2023                         Would you like a call back once the refill request has been completed: [] Yes [] No    If the office needs to give you a call back, can they leave a voicemail: [] Yes [] No    Lorene Kim MA  11/07/23, 13:09 EST

## 2023-11-08 RX ORDER — PHENTERMINE HYDROCHLORIDE 8 MG/1
1 TABLET ORAL 3 TIMES DAILY
Qty: 90 EACH | Refills: 0 | Status: SHIPPED | OUTPATIENT
Start: 2023-11-08

## 2023-12-07 ENCOUNTER — TELEMEDICINE (OUTPATIENT)
Dept: BARIATRICS/WEIGHT MGMT | Facility: CLINIC | Age: 43
End: 2023-12-07
Payer: COMMERCIAL

## 2023-12-07 VITALS — WEIGHT: 170 LBS | HEIGHT: 68 IN | BODY MASS INDEX: 25.76 KG/M2

## 2023-12-07 DIAGNOSIS — F41.1 ANXIETY AS ACUTE REACTION TO EXCEPTIONAL STRESS: ICD-10-CM

## 2023-12-07 DIAGNOSIS — F43.0 ANXIETY AS ACUTE REACTION TO EXCEPTIONAL STRESS: ICD-10-CM

## 2023-12-07 DIAGNOSIS — E66.3 OVERWEIGHT (BMI 25.0-29.9): Primary | ICD-10-CM

## 2023-12-07 RX ORDER — PHENTERMINE HYDROCHLORIDE 8 MG/1
1 TABLET ORAL 3 TIMES DAILY
Qty: 90 EACH | Refills: 0 | Status: SHIPPED | OUTPATIENT
Start: 2023-12-07

## 2023-12-07 RX ORDER — NALTREXONE HYDROCHLORIDE 50 MG/1
TABLET, FILM COATED ORAL
Qty: 30 TABLET | Refills: 2 | Status: SHIPPED | OUTPATIENT
Start: 2023-12-07

## 2023-12-07 NOTE — ASSESSMENT & PLAN NOTE
Patient's (Body mass index is 26.23 kg/m².) indicates that they are overweight with health conditions that include GERD . Weight is improving with treatment. BMI is is above average; BMI management plan is completed. We discussed low calorie, low carb based diet program, portion control, increasing exercise, management of depression/anxiety/stress to control compensatory eating, and pharmacologic options including phentermine, metformin, naltrexone .     I have instructed the patient to continue with pursuit of medical weight loss as a part of this program. Patient does meet criteria for use of anorectics at this time as BMI >25 with , is not at treatment goal, and PCOS .     The current plan for this month includes:   - Continue to avoid stress eating  - Continue mindfulness of protein intake  - Bring back meal planning/grocery shopping  - Treatment goal 165lb    Continue sympathomimetic (medication refilled).  This patient 1) has no evidence of serious cardiovascular disease; 2) does not have serious psychiatric disease or a history of substance abuse; 3) has been informed about weight loss medications that are FDA approved for long-term use and told that these have been all documented to be safe and effective whereas phentermine has not; 4) does not demonstrate a clinically significant increase in pulse or blood pressure when taking phentermine; and 5) demonstrate significant weight loss while using the medication.  Patient understands that all antiobesity medications are contraindicated in pregnancy.  Patient denies a history of glaucoma.  Patient understands that long-term use of phentermine is considered off label use of this medication, however, that the endocrine Society and recent research supports that long-term use of phentermine does not appear to have detrimental health effects when used and the appropriate patient.  In addition, a 2019 study published in an obesity journal on [id number] patient's concluded  "that \"recommendations to limit phentermine to less than 3 months do not align with current concept of pharmacologic treatment of obesity\", and that \"long-term phentermine users experience greater weight loss without apparent increases in cardiovascular risk\".    We reviewed potential side effects including insomnia, dry mouth, increased heart rate and blood pressure, increased anxiety.  We reviewed reducing caffeine consumption while taking phentermine.  especially if the patient is experience side effects.  The potential risk and benefits of phentermine have been reviewed with the patient, and alternative treatment options were discussed.  All questions were answered, and the patient wishes to move forward with this medication.      "

## 2023-12-07 NOTE — ASSESSMENT & PLAN NOTE
Psychological condition is worsening.  Multiple stressors- mother's liver transplant in [place], father's sudden death from MI, daughter's brain surgery this summer. Continue to avoid stress eating, look for opportunities to increase protein and get exercise in.  Consider [facility] navigators in the future for grief counseling.  Psychological condition  will be reassessed at the next regular appointment.   Ivermectin Counseling:  Patient instructed to take medication on an empty stomach with a full glass of water.  Patient informed of potential adverse effects including but not limited to nausea, diarrhea, dizziness, itching, and swelling of the extremities or lymph nodes.  The patient verbalized understanding of the proper use and possible adverse effects of ivermectin.  All of the patient's questions and concerns were addressed.

## 2023-12-07 NOTE — PROGRESS NOTES
Office Note      Date: 2023  Patient Name: Mariel Fox  MRN: [id number]  : 1980    This service was conducted via [employer].  Patient is located at her home address.  Provider is located at her work address. The use of a video visit has been reviewed with the patient and informed consent has been obtained.  Subjective  Subjective     Chief Complaint  Obesity Management follow-up    Subjective          Mariel Fox presents to [facility] WEIGHT MANAGEMENT via telehealth for obesity management.  LSG 2020 with Dr. Ojeda. Presurgery weight: 249 pounds, goal 165lb.     Patient is satisfied with weight loss progress. Appetite is well controlled. Lomaira- always gets 2 a day in, sometimes 3. Has gotten up to 1.5 metformin at bedtime, two causes nausea. She does feel elias quicker since starting it. She has endured significant stressors over the last few months. Her father passed suddenly from a MI since last visit and her mother had a liver transplant in [place] and she has had to travel there frequently. She knows she is not getting enough protein but feels she is doing her best in the circumstances. Make time to workout twice this week.    Review of Systems   Constitutional:  Negative for appetite change and fatigue.   Eyes:  Negative for blurred vision, double vision and visual disturbance.   Cardiovascular:  Negative for chest pain and palpitations.   Gastrointestinal:  Negative for abdominal pain, constipation, diarrhea, nausea, vomiting and GERD.   Endocrine: Negative for polydipsia, polyphagia and polyuria.   Musculoskeletal:  Negative for arthralgias, back pain and myalgias.   Neurological:  Negative for dizziness, tremors, light-headedness, headache and memory problem.        Parasthesias negative   Psychiatric/Behavioral:  Positive for stress. Negative for sleep disturbance and depressed mood. The patient is not nervous/anxious.          Objective   Start  "weight: 186 pounds.    Total weight loss: -16 pounds/8.6%  Change in weight since last visit: -2  249  Body mass index is 26.23 kg/m².   Measurements (in inches)     Ht 171.5 cm (67.5\")   Wt 77.1 kg (170 lb)   BMI 26.23 kg/m²       Result Review :                 Assessment and Plan    Diagnoses and all orders for this visit:    1. Overweight (BMI 25.0-29.9) (Primary)  Assessment & Plan:  Patient's (Body mass index is 26.23 kg/m².) indicates that they are overweight with health conditions that include GERD . Weight is improving with treatment. BMI is is above average; BMI management plan is completed. We discussed low calorie, low carb based diet program, portion control, increasing exercise, management of depression/anxiety/stress to control compensatory eating, and pharmacologic options including phentermine, metformin, naltrexone .     I have instructed the patient to continue with pursuit of medical weight loss as a part of this program. Patient does meet criteria for use of anorectics at this time as BMI >25 with , is not at treatment goal, and PCOS .     The current plan for this month includes:   - Continue to avoid stress eating  - Continue mindfulness of protein intake  - Bring back meal planning/grocery shopping  - Treatment goal 165lb    Continue sympathomimetic (medication refilled).  This patient 1) has no evidence of serious cardiovascular disease; 2) does not have serious psychiatric disease or a history of substance abuse; 3) has been informed about weight loss medications that are FDA approved for long-term use and told that these have been all documented to be safe and effective whereas phentermine has not; 4) does not demonstrate a clinically significant increase in pulse or blood pressure when taking phentermine; and 5) demonstrate significant weight loss while using the medication.  Patient understands that all antiobesity medications are contraindicated in pregnancy.  Patient denies a history " "of glaucoma.  Patient understands that long-term use of phentermine is considered off label use of this medication, however, that the endocrine Society and recent research supports that long-term use of phentermine does not appear to have detrimental health effects when used and the appropriate patient.  In addition, a 2019 study published in an obesity journal on [id number] patient's concluded that \"recommendations to limit phentermine to less than 3 months do not align with current concept of pharmacologic treatment of obesity\", and that \"long-term phentermine users experience greater weight loss without apparent increases in cardiovascular risk\".    We reviewed potential side effects including insomnia, dry mouth, increased heart rate and blood pressure, increased anxiety.  We reviewed reducing caffeine consumption while taking phentermine.  especially if the patient is experience side effects.  The potential risk and benefits of phentermine have been reviewed with the patient, and alternative treatment options were discussed.  All questions were answered, and the patient wishes to move forward with this medication.        Orders:  -     Phentermine HCl (Lomaira) 8 MG tablet; Take 1 tablet by mouth 3 (Three) Times a Day.  Dispense: 90 each; Refill: 0  -     naltrexone (DEPADE) 50 MG tablet; Take 1/2 tablet by mouth twice daily.  Dispense: 30 tablet; Refill: 2    2. Anxiety as acute reaction to exceptional stress  Assessment & Plan:  Psychological condition is worsening.  Multiple stressors- mother's liver transplant in [place], father's sudden death from MI, daughter's brain surgery this summer. Continue to avoid stress eating, look for opportunities to increase protein and get exercise in.  Consider [facility] navigators in the future for grief counseling.  Psychological condition  will be reassessed at the next regular appointment.            Follow Up   Return in about 1 month (around 1/7/2024) for Next " scheduled follow up, in office.  Patient was given instructions and counseling regarding her condition or for health maintenance advice. Please see specific information pulled into the AVS if appropriate.     ROCCO Calero

## 2023-12-14 DIAGNOSIS — E28.2 PCOS (POLYCYSTIC OVARIAN SYNDROME): ICD-10-CM

## 2023-12-14 DIAGNOSIS — E66.3 OVERWEIGHT (BMI 25.0-29.9): ICD-10-CM

## 2023-12-14 RX ORDER — METFORMIN HYDROCHLORIDE 500 MG/1
1000 TABLET, EXTENDED RELEASE ORAL
Qty: 60 TABLET | Refills: 2 | Status: SHIPPED | OUTPATIENT
Start: 2023-12-14

## 2024-01-11 ENCOUNTER — OFFICE VISIT (OUTPATIENT)
Dept: BARIATRICS/WEIGHT MGMT | Facility: CLINIC | Age: 44
End: 2024-01-11
Payer: COMMERCIAL

## 2024-01-11 VITALS
HEART RATE: 92 BPM | BODY MASS INDEX: 26.28 KG/M2 | DIASTOLIC BLOOD PRESSURE: 72 MMHG | SYSTOLIC BLOOD PRESSURE: 120 MMHG | OXYGEN SATURATION: 96 % | HEIGHT: 68 IN | WEIGHT: 173.4 LBS

## 2024-01-11 DIAGNOSIS — E28.2 PCOS (POLYCYSTIC OVARIAN SYNDROME): ICD-10-CM

## 2024-01-11 DIAGNOSIS — E66.3 OVERWEIGHT (BMI 25.0-29.9): ICD-10-CM

## 2024-01-11 NOTE — PROGRESS NOTES
[facility]  [address]  [place] [address]     Office Note      Date: 2024  Patient Name: Mariel Fox  MRN: [id number]  : 1980  Subjective  Subjective     Chief Complaint  Obesity Management follow-up          Mariel Fox presents to [facility] for obesity management. LSG 2020 with Dr. Ojeda. Presurgery weight: 249 pounds, goal 165lb.  Patient is unsatisfied with weight loss progress. Appetite is poorly controlled. States she doesn't think the phentermine (lomaira) is working any more. Takes 3 times a day most days. She has not been focused on her diet with everything going on with her family. Her teenage boys are now doing a high protein diet so that will make it easier for her to choose high protein foods for her own diet. Reports no side effects of prescribed medications today. The patient is taking multivitamin and is not taking fish oil.  The patient is not using a food journal.    The patient is exercising with a FITT score of:    Frequency Intensity Time Strength Training   []   0, none []   0 []   0 []   0   [x]   1 (1-2x/week) []   1 (light) []   1 (<10 min) []   1 (1x/week)   []   2 (3-5x/week) [x]   2 (moderate) []   2 (10-20 min) [x]   2 (2x/week)   []   3 (daily) []   3 (moderately hard)  []   4 (very hard) []   3 (20-30 min)  [x]   4 (>30 min) []   3 (3-4x/week)       Review of Systems   Constitutional:  Negative for appetite change and fatigue.   Eyes:  Negative for visual disturbance.   Cardiovascular:  Negative for chest pain and palpitations.   Gastrointestinal:  Positive for nausea. Negative for constipation and indigestion.   Neurological:  Negative for light-headedness.         Current Outpatient Medications:     5-Hydroxytryptophan (5-HTP) 50 MG capsule, Take up to 6 sprays a day under the tongue (hold under tongue to 20 seconds before swallowing)., Disp: 90 each, Rfl: 0    Cimzia  "Starter Kit 6 X 200 MG/ML Prefilled Syringe Kit, , Disp: , Rfl:     diclofenac (VOLTAREN) 75 MG EC tablet, Take 1 tablet by mouth Daily., Disp: , Rfl:     DULoxetine (CYMBALTA) 60 MG capsule, Take 1 capsule by mouth Daily., Disp: , Rfl:     Insulin Pen Needle 32G X 4 MM misc, 1 dose Daily., Disp: 90 each, Rfl: 2    levonorgestrel (Mirena, 52 MG,) 20 MCG/24HR IUD, 1 each by Intrauterine route., Disp: , Rfl:     multivitamin with minerals (Multivitamin Adults) tablet tablet, Take 1 tablet by mouth Daily., Disp: 30 tablet, Rfl: 2    naltrexone (DEPADE) 50 MG tablet, Take 1/2 tablet by mouth twice daily., Disp: 30 tablet, Rfl: 2    omeprazole (priLOSEC) 40 MG capsule, Take 1 capsule by mouth Daily., Disp: 30 capsule, Rfl: 11    ondansetron (ZOFRAN) 8 MG tablet, TAKE ONE TABLET BY MOUTH EVERY 8 HOURS AS NEEDED FOR NAUSEA, Disp: 30 tablet, Rfl: 0    Phentermine HCl (Lomaira) 8 MG tablet, Take 1 tablet by mouth 3 (Three) Times a Day., Disp: 90 each, Rfl: 0    simvastatin (ZOCOR) 20 MG tablet, Take 1 tablet by mouth Every Night., Disp: , Rfl: 1    spironolactone (ALDACTONE) 100 MG tablet, Take 1 tablet by mouth Every 12 (Twelve) Hours., Disp: , Rfl:     Objective   Start weight: 186 pounds.    Total Loss lb/%Loss of beginning body weight (BBW): -12.6lb/-6.77%  Change in weight since last visit: +3.4    Body mass index is 26.76 kg/m².   Body composition analysis completed and showed:   Body Fat %: 39.4    Measurements (in inches)  Waist Circumference: 36.5      Vital Signs:   /72 (BP Location: Left arm, Patient Position: Sitting)   Pulse 92   Ht 171.5 cm (67.5\")   Wt 78.7 kg (173 lb 6.4 oz)   SpO2 96%   BMI 26.76 kg/m²     Physical Exam   General appears stated age and normal appearance   HEENT PERRLA, EOM intact, and conjunctivae normal   Chest/lungs Normal rate, Regular rhythm, and Breathing is unlabored   Extremities without edema   Neuro Good historian and No focal deficit   Skin Warm, dry, intact   Psych " normal behavior, normal thought content, and normal concentration     Result Review :                Assessment / Plan        Diagnoses and all orders for this visit:    1. Overweight (BMI 25.0-29.9)  Assessment & Plan:  Patient's (Body mass index is 26.76 kg/m².) indicates that they are overweight with health conditions that include dyslipidemias, GERD, and PCOS  . Weight is worsening. BMI is is above average; BMI management plan is completed. We discussed low calorie, low carb based diet program, portion control, increasing exercise, joining a fitness center or start home based exercise program, management of depression/anxiety/stress to control compensatory eating, pharmacologic options including phentermine, naltrexone , and an kesha-based approach such as [employer] Pal or Lose It.     I have instructed the patient to continue with pursuit of medical weight loss as a part of this program. Patient does meet criteria for use of anorectics at this time as body fat percentage >25% , hyperlipidemia, and is not at treatment goal.     The current plan for this month includes:   - Continue to work on lifestyle behavioral changes  - Bring back diet focus and calorie restriction.  - Bring back exercise 4 days a week.   - Pack snacks/lunch.   - Medications for appetite seem to be losing effectiveness. Start pulse therapy for phentermine (skip two days for 1 week then take every other day for 1 week then restart daily). Discontinue naltrexone for 1 week then restart.   - Treatment goal 160-165lb.       2. PCOS (polycystic ovarian syndrome)  Assessment & Plan:  Metformin caused nausea with increased dose, was treating with zofran. No improvement in PCOS/IR symptoms with metformin, discontinue. Continue low carb diet, regular physical activity, and maintain weight reduction of 10-15%.               Follow Up   Return in about 5 weeks (around 2/15/2024) for Next scheduled follow up.  Patient was given instructions and counseling  regarding her condition or for health maintenance advice. Please see specific information pulled into the AVS if appropriate.     Racheal Gonsalez, RCOCO  01/11/2024

## 2024-01-11 NOTE — ASSESSMENT & PLAN NOTE
Metformin caused nausea with increased dose, was treating with zofran. No improvement in PCOS/IR symptoms with metformin, discontinue. Continue low carb diet, regular physical activity, and maintain weight reduction of 10-15%.

## 2024-01-11 NOTE — ASSESSMENT & PLAN NOTE
Patient's (Body mass index is 26.76 kg/m².) indicates that they are overweight with health conditions that include dyslipidemias, GERD, and PCOS  . Weight is worsening. BMI is is above average; BMI management plan is completed. We discussed low calorie, low carb based diet program, portion control, increasing exercise, joining a fitness center or start home based exercise program, management of depression/anxiety/stress to control compensatory eating, pharmacologic options including phentermine, naltrexone , and an kesha-based approach such as [employer] Pal or Lose It.     I have instructed the patient to continue with pursuit of medical weight loss as a part of this program. Patient does meet criteria for use of anorectics at this time as body fat percentage >25% , hyperlipidemia, and is not at treatment goal.     The current plan for this month includes:   - Continue to work on lifestyle behavioral changes  - Bring back diet focus and calorie restriction.  - Bring back exercise 4 days a week.   - Pack snacks/lunch.   - Medications for appetite seem to be losing effectiveness. Start pulse therapy for phentermine (skip two days for 1 week then take every other day for 1 week then restart daily). Discontinue naltrexone for 1 week then restart.   - Treatment goal 160-165lb.

## 2024-01-25 ENCOUNTER — TELEMEDICINE (OUTPATIENT)
Dept: BARIATRICS/WEIGHT MGMT | Facility: CLINIC | Age: 44
End: 2024-01-25
Payer: COMMERCIAL

## 2024-01-25 DIAGNOSIS — Z90.3 POSTGASTRECTOMY MALABSORPTION: ICD-10-CM

## 2024-01-25 DIAGNOSIS — E66.3 OVERWEIGHT (BMI 25.0-29.9): Primary | ICD-10-CM

## 2024-01-25 DIAGNOSIS — Z13.21 MALNUTRITION SCREEN: ICD-10-CM

## 2024-01-25 DIAGNOSIS — Z13.0 SCREENING, IRON DEFICIENCY ANEMIA: ICD-10-CM

## 2024-01-25 DIAGNOSIS — E55.9 HYPOVITAMINOSIS D: ICD-10-CM

## 2024-01-25 DIAGNOSIS — R53.83 FATIGUE, UNSPECIFIED TYPE: ICD-10-CM

## 2024-01-25 DIAGNOSIS — K91.2 POSTGASTRECTOMY MALABSORPTION: ICD-10-CM

## 2024-01-25 RX ORDER — OMEPRAZOLE 40 MG/1
40 CAPSULE, DELAYED RELEASE ORAL DAILY
Qty: 30 CAPSULE | Refills: 11 | Status: SHIPPED | OUTPATIENT
Start: 2024-01-25 | End: 2025-01-24

## 2024-01-25 NOTE — PROGRESS NOTES
[facility]  [address]    [facility] [id number] [phone]        Patient Name:  Mariel Fox  :  1980      Date of Visit: 2024      Reason for Visit:   4 years postop      HPI: Mariel Fox is a 43 y.o. female s/p LSG 20 w/ Dr. Ojeda     Doing ok. Following with [facility] and has lost a few pounds. Seems to be hovering in the same range. Reflux is mostly controlled on daily Omeprazole.Denies dysphagia, nausea, vomiting, abdominal pain, hair loss, memory loss, vision changes, and numbness/tingling.  Getting 60-80g prot/day.  Drinking 64 fluid oz/day.  Last labs in 2023 were wnl. Not on any vitamins-was making her nauseated. On Omeprazole .  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     Presurgery weight: 249 pounds. Most recent weight 174lbs     Past Medical History:   Diagnosis Date    Anxiety and depression     Cancer     melanoma    Chronic back pain     last steroid injection 2017    Dyspnea on exertion     Endometriosis     Fatigue     Fatty liver     follows w/ GI ([facility]), Mom w/ hx NUNES Cirrhosis, denies prior liver bx    GERD (gastroesophageal reflux disease)     Hyperlipidemia     Hypertension     Melanoma in situ     (R) forearm, resected, no subsequet tx, follows w/ derm    MTHFR mutation     hx miscarriage x 2, no hx DVT/PE, takes ASA81 mg qod    Obesity     PCOS (polycystic ovarian syndrome)     on Spironolactone    Presence of dental bridge     permanent retainer bottom    Psoriatic arthritis     follows w/ Rheumatology ([facility]), on Enbrel/Gabapentin + prn Ibupfroen     Past Surgical History:   Procedure Laterality Date    D & C HYSTEROSCOPY      x 5    ENDOSCOPY N/A 2020    Procedure: ESOPHAGOGASTRODUODENOSCOPY;  Surgeon: Shayy Ojeda MD;  Location: [facility];  Service:  Bariatric;  Laterality: N/A;    ENDOSCOPY N/A 5/15/2023    Procedure: ESOPHAGOGASTRODUODENOSCOPY WITH BIOPSY;  Surgeon: Shayy Ojeda MD;  Location:  [facility];  Service: General;  Laterality: N/A;    GASTRIC SLEEVE LAPAROSCOPIC N/A 02/04/2020    Procedure: GASTRIC SLEEVE LAPAROSCOPIC;  Surgeon: Shayy Ojeda MD;  Location:  [facility];  Service: Bariatric;  Laterality: N/A;    INTRAUTERINE DEVICE INSERTION      in place still    PLANTAR FASCIA SURGERY Left 01/19/2021    SALPINGECTOMY Left 2013    ectopic pregnancy    SKIN SURGERY      wide local excision right forearm melanoma in situ, also another for dysplastic nevus.    TARSAL TUNNEL RELEASE Right 2015    (R) foot    TONSILLECTOMY  1990    WISDOM TOOTH EXTRACTION       Outpatient Medications Marked as Taking for the 1/25/24 encounter (Telemedicine) with Mariela Persaud PA   Medication Sig Dispense Refill    5-Hydroxytryptophan (5-HTP) 50 MG capsule Take up to 6 sprays a day under the tongue (hold under tongue to 20 seconds before swallowing). 90 each 0    Cimzia Starter Kit 6 X 200 MG/ML Prefilled Syringe Kit       diclofenac (VOLTAREN) 75 MG EC tablet Take 1 tablet by mouth Daily.      DULoxetine (CYMBALTA) 60 MG capsule Take 1 capsule by mouth Daily.      Insulin Pen Needle 32G X 4 MM misc 1 dose Daily. 90 each 2    levonorgestrel (Mirena, 52 MG,) 20 MCG/24HR IUD 1 each by Intrauterine route.      multivitamin with minerals (Multivitamin Adults) tablet tablet Take 1 tablet by mouth Daily. 30 tablet 2    naltrexone (DEPADE) 50 MG tablet Take 1/2 tablet by mouth twice daily. 30 tablet 2    omeprazole (priLOSEC) 40 MG capsule Take 1 capsule by mouth Daily. 30 capsule 11    ondansetron (ZOFRAN) 8 MG tablet TAKE ONE TABLET BY MOUTH EVERY 8 HOURS AS NEEDED FOR NAUSEA 30 tablet 0    Phentermine HCl (Lomaira) 8 MG tablet Take 1 tablet by mouth 3 (Three) Times a Day. 90 each 0    simvastatin (ZOCOR) 20 MG tablet Take 1 tablet by mouth Every Night.   1    spironolactone (ALDACTONE) 100 MG tablet Take 1 tablet by mouth Every 12 (Twelve) Hours.      [DISCONTINUED] omeprazole (priLOSEC) 40 MG capsule Take 1 capsule by mouth Daily. 30 capsule 11       Allergies   Allergen Reactions    Adhesive Tape Itching and Rash     Longer period of time irritation-     Paper tape is okay        Social History     Socioeconomic History    Marital status:    Tobacco Use    Smoking status: Never    Smokeless tobacco: Never   Vaping Use    Vaping Use: Never used   Substance and Sexual Activity    Alcohol use: No    Drug use: No    Sexual activity: Defer     Social History     Social History Narrative    Living in [place], [place] w/  and 4 children.  [facility].  She is an occupational therapist and owner.         There were no vitals taken for this visit.    Physical Exam  Constitutional:       General: She is not in acute distress.  Pulmonary:      Effort: Pulmonary effort is normal.   Neurological:      Mental Status: She is alert and oriented to person, place, and time.   Psychiatric:         Mood and Affect: Mood normal.         Behavior: Behavior normal.         Thought Content: Thought content normal.         Judgment: Judgment normal.           Assessment:  4 years s/p LSG 2/4/20 w/ Dr. Ojeda     ICD-10-CM ICD-9-CM   1. Overweight (BMI 25.0-29.9)  E66.3 278.02   2. Postgastrectomy malabsorption  K91.2 579.3    Z90.3    3. Malnutrition screen  Z13.21 V77.2   4. Screening, iron deficiency anemia  Z13.0 V78.0   5. Hypovitaminosis D  E55.9 268.9   6. Fatigue, unspecified type  R53.83 780.79          BMI is >= 25 and <30. (Overweight) The following options were offered after discussion;: exercise counseling/recommendations and nutrition counseling/recommendations      Plan:  Doing well. Continue w/ good food choices and healthy habits.  Continue protein >70g/day.  Continue routine physical activity.  Routine bariatric  labs ordered.  Continue vitamins w/ adjustments pending lab results.  Call w/ problems/concerns.     Reflux- continue Omeprazole 40mg daily.     The patient was instructed to follow up in 1 year, sooner if needed.

## 2024-01-26 DIAGNOSIS — E66.3 OVERWEIGHT (BMI 25.0-29.9): ICD-10-CM

## 2024-01-26 NOTE — TELEPHONE ENCOUNTER
Rx Refill Note  Requested Prescriptions     Pending Prescriptions Disp Refills    Lomaira 8 MG tablet [Pharmacy Med Name: Lomaira 8 mg tablet]  0     Sig: TAKE ONE TABLET BY MOUTH THREE TIMES DAILY      Last office visit with prescribing clinician: 1/11/2024   Last telemedicine visit with prescribing clinician: 12/7/2023   Next office visit with prescribing clinician: 2/14/2024                         Would you like a call back once the refill request has been completed: [] Yes [] No    If the office needs to give you a call back, can they leave a voicemail: [] Yes [] No    Lorene Kim MA  01/26/24, 16:01 EST

## 2024-01-29 RX ORDER — PHENTERMINE HYDROCHLORIDE 8 MG/1
1 TABLET ORAL 3 TIMES DAILY
Qty: 90 EACH | Refills: 0 | Status: SHIPPED | OUTPATIENT
Start: 2024-01-29

## 2024-02-14 ENCOUNTER — TELEMEDICINE (OUTPATIENT)
Dept: BARIATRICS/WEIGHT MGMT | Facility: CLINIC | Age: 44
End: 2024-02-14
Payer: COMMERCIAL

## 2024-02-14 VITALS
SYSTOLIC BLOOD PRESSURE: 110 MMHG | HEIGHT: 68 IN | BODY MASS INDEX: 25.91 KG/M2 | HEART RATE: 78 BPM | DIASTOLIC BLOOD PRESSURE: 72 MMHG | WEIGHT: 171 LBS

## 2024-02-14 DIAGNOSIS — F43.0 ANXIETY AS ACUTE REACTION TO EXCEPTIONAL STRESS: ICD-10-CM

## 2024-02-14 DIAGNOSIS — E66.3 OVERWEIGHT (BMI 25.0-29.9): Primary | ICD-10-CM

## 2024-02-14 DIAGNOSIS — F41.1 ANXIETY AS ACUTE REACTION TO EXCEPTIONAL STRESS: ICD-10-CM

## 2024-02-14 RX ORDER — NALTREXONE HYDROCHLORIDE 50 MG/1
TABLET, FILM COATED ORAL
Qty: 30 TABLET | Refills: 2 | Status: SHIPPED | OUTPATIENT
Start: 2024-02-14

## 2024-03-11 DIAGNOSIS — E66.3 OVERWEIGHT (BMI 25.0-29.9): ICD-10-CM

## 2024-03-11 RX ORDER — PHENTERMINE HYDROCHLORIDE 8 MG/1
1 TABLET ORAL 3 TIMES DAILY
Qty: 90 EACH | Refills: 0 | Status: SHIPPED | OUTPATIENT
Start: 2024-03-11

## 2024-05-14 ENCOUNTER — OFFICE VISIT (OUTPATIENT)
Dept: BARIATRICS/WEIGHT MGMT | Facility: CLINIC | Age: 44
End: 2024-05-14
Payer: COMMERCIAL

## 2024-05-14 VITALS
WEIGHT: 178.6 LBS | HEART RATE: 85 BPM | DIASTOLIC BLOOD PRESSURE: 74 MMHG | SYSTOLIC BLOOD PRESSURE: 116 MMHG | BODY MASS INDEX: 27.07 KG/M2 | HEIGHT: 68 IN

## 2024-05-14 DIAGNOSIS — R53.83 FATIGUE, UNSPECIFIED TYPE: ICD-10-CM

## 2024-05-14 DIAGNOSIS — E66.3 OVERWEIGHT (BMI 25.0-29.9): Primary | ICD-10-CM

## 2024-05-14 DIAGNOSIS — F98.8 ATTENTION DEFICIT DISORDER, UNSPECIFIED HYPERACTIVITY PRESENCE: ICD-10-CM

## 2024-05-14 PROCEDURE — 96372 THER/PROPH/DIAG INJ SC/IM: CPT | Performed by: NURSE PRACTITIONER

## 2024-05-14 PROCEDURE — 99214 OFFICE O/P EST MOD 30 MIN: CPT | Performed by: NURSE PRACTITIONER

## 2024-05-14 RX ORDER — CYANOCOBALAMIN 1000 UG/ML
1000 INJECTION, SOLUTION INTRAMUSCULAR; SUBCUTANEOUS
Status: SHIPPED | OUTPATIENT
Start: 2024-05-14

## 2024-05-14 RX ORDER — NALTREXONE HYDROCHLORIDE 50 MG/1
100 TABLET, FILM COATED ORAL DAILY
Qty: 60 TABLET | Refills: 2 | Status: SHIPPED | OUTPATIENT
Start: 2024-05-14

## 2024-05-14 RX ORDER — DEXTROAMPHETAMINE SACCHARATE, AMPHETAMINE ASPARTATE MONOHYDRATE, DEXTROAMPHETAMINE SULFATE AND AMPHETAMINE SULFATE 2.5; 2.5; 2.5; 2.5 MG/1; MG/1; MG/1; MG/1
10 CAPSULE, EXTENDED RELEASE ORAL EVERY MORNING
COMMUNITY
Start: 2024-05-14

## 2024-05-14 RX ADMIN — CYANOCOBALAMIN 1000 MCG: 1000 INJECTION, SOLUTION INTRAMUSCULAR; SUBCUTANEOUS at 14:56

## 2024-05-14 NOTE — ASSESSMENT & PLAN NOTE
Psychological condition is newly identified.  Managed by psychiatry. Starting adderall. Advised that lomaira is contraindicated due to additive effect with other stimulants.   Psychological condition  will be reassessed at the next regular appointment.

## 2024-05-14 NOTE — PROGRESS NOTES
[facility]  [address]  [place] [address]     Office Note      Date: 2024  Patient Name: Mariel Fox  MRN: [id number]  : 1980  Subjective  Subjective     Chief Complaint  Obesity Management follow-up          Mariel Fox presents to [facility] for obesity management. LSG 2020 with Dr. Ojeda. Presurgery weight: 249 pounds, goal 165lb.  Patient is unsatisfied with weight loss progress. Appetite is poorly controlled. Reports no side effects of prescribed medications today. Naltrexone has not been effective lately. She wants to know if she can increase to dose to two tablets daily. The patient is not taking multivitamin and is not taking fish oil.  The patient is not using a food journal but she is making mindful choices and getting adequate protein intake.  She had a dental procedure a few weeks ago and had to take a week off of work. She is staying well hydrated. Has several pens of saxenda left. Tried vyvanse and then concerta but did not tolerate either. Will be starting adderall today.  Discontinued lomaira as discussed via [employer] messaging.   The patient is exercising- crossfit 2-3 days a week-exercises at least 4 days a week, with a FITT score of:    Frequency Intensity Time Strength Training   []   0, none []   0 []   0 []   0   []   1 (1-2x/week) []   1 (light) []   1 (<10 min) []   1 (1x/week)   [x]   2 (3-5x/week) []   2 (moderate) []   2 (10-20 min) [x]   2 (2x/week)   []   3 (daily) [x]   3 (moderately hard)  []   4 (very hard) [x]   3 (20-30 min)  []   4 (>30 min) []   3 (3-4x/week)       Review of Systems   Constitutional:  Negative for appetite change and fatigue.   Eyes:  Negative for visual disturbance.   Cardiovascular:  Negative for chest pain and palpitations.   Gastrointestinal:  Negative for constipation and indigestion.   Neurological:  Negative for light-headedness.   All other  systems reviewed and are negative.        Current Outpatient Medications:     amphetamine-dextroamphetamine XR (ADDERALL XR) 10 MG 24 hr capsule, Take 1 capsule by mouth Every Morning, Disp: , Rfl:     Cimzia Starter Kit 6 X 200 MG/ML Prefilled Syringe Kit, , Disp: , Rfl:     diclofenac (VOLTAREN) 75 MG EC tablet, Take 1 tablet by mouth Daily., Disp: , Rfl:     DULoxetine (CYMBALTA) 60 MG capsule, Take 1 capsule by mouth Daily., Disp: , Rfl:     Insulin Pen Needle 32G X 4 MM misc, 1 dose Daily., Disp: 90 each, Rfl: 2    levonorgestrel (Mirena, 52 MG,) 20 MCG/24HR IUD, 1 each by Intrauterine route., Disp: , Rfl:     multivitamin with minerals (Multivitamin Adults) tablet tablet, Take 1 tablet by mouth Daily., Disp: 30 tablet, Rfl: 2    naltrexone (DEPADE) 50 MG tablet, Take 2 tablets by mouth Daily., Disp: 60 tablet, Rfl: 2    omeprazole (priLOSEC) 40 MG capsule, Take 1 capsule by mouth Daily., Disp: 30 capsule, Rfl: 11    ondansetron (ZOFRAN) 8 MG tablet, TAKE ONE TABLET BY MOUTH EVERY 8 HOURS AS NEEDED FOR NAUSEA, Disp: 30 tablet, Rfl: 0    simvastatin (ZOCOR) 20 MG tablet, Take 1 tablet by mouth Every Night., Disp: , Rfl: 1    spironolactone (ALDACTONE) 100 MG tablet, Take 1 tablet by mouth Every 12 (Twelve) Hours., Disp: , Rfl:     5-Hydroxytryptophan (5-HTP) 50 MG capsule, Take up to 6 sprays a day under the tongue (hold under tongue to 20 seconds before swallowing). (Patient not taking: Reported on 5/14/2024), Disp: 90 each, Rfl: 0    Liraglutide (SAXENDA) 18 MG/3ML injection pen, Inject 0.6 mg under the skin into the appropriate area as directed Daily for 30 days., Disp: , Rfl:     Phentermine HCl (Lomaira) 8 MG tablet, TAKE ONE TABLET BY MOUTH THREE TIMES DAILY (Patient not taking: Reported on 5/14/2024), Disp: 90 each, Rfl: 0    Objective   Start weight: 186 pounds.    Total Loss lb/%Loss of beginning body weight (BBW): -7.4 lb/-3.98 %  Change in weight since last visit: +7.6 lb    Body mass index is 27.56  "kg/m².   Body composition analysis completed and showed:   Body Fat %: 38.5    Measurements (in inches)  Waist Circumference: 35.5      Vital Signs:   /74   Pulse 85   Ht 171.5 cm (67.5\")   Wt 81 kg (178 lb 9.6 oz)   BMI 27.56 kg/m²     Physical Exam   General appears stated age and normal appearance   HEENT PERRLA, EOM intact, and conjunctivae normal   Chest/lungs Normal rate, Regular rhythm, and Breathing is unlabored   Extremities without edema   Neuro Good historian and No focal deficit   Skin Warm, dry, intact   Psych normal behavior, normal thought content, and normal concentration     Result Review :                Assessment / Plan        Diagnoses and all orders for this visit:    1. Overweight (BMI 25.0-29.9) (Primary)  Assessment & Plan:  Patient's (Body mass index is 27.56 kg/m².) indicates that they are overweight with health conditions that include dyslipidemias and GERD . Weight is worsening. BMI is is above average; BMI management plan is completed. We discussed low calorie, low carb based diet program, portion control, increasing exercise, pharmacologic options including saxenda, naltrexone, and an kesha-based approach such as [employer] Pal or Lose It.     I have instructed the patient to continue with pursuit of medical weight loss as a part of this program. Patient does meet criteria for use of anorectics at this time as BMI > 27 with coexisting, hyperlipidemia, and is not at treatment goal.     The current plan for this month includes:   - Continue current exercise efforts  - Continue to prioritize protein, fiber, and hydration.   - Restart saxenda 0.6mg, has several pens at home. Does not typically tolerate higher dosage.  - OK to increase naltrexone to 50mg bid, discussed this is off label use for obesity and should be limited to short term us (<3 months).   - Treatment goal 160-165lb      Orders:  -     Liraglutide (SAXENDA) 18 MG/3ML injection pen; Inject 0.6 mg under the skin into " the appropriate area as directed Daily for 30 days.  -     naltrexone (DEPADE) 50 MG tablet; Take 2 tablets by mouth Daily.  Dispense: 60 tablet; Refill: 2    2. Attention deficit disorder, unspecified hyperactivity presence  Assessment & Plan:  Psychological condition is newly identified.  Managed by psychiatry. Starting adderall. Advised that lomaira is contraindicated due to additive effect with other stimulants.   Psychological condition  will be reassessed at the next regular appointment.            Follow Up   Return in about 6 weeks (around 6/25/2024) for Next scheduled follow up.  Patient was given instructions and counseling regarding her condition or for health maintenance advice. Please see specific information pulled into the AVS if appropriate.     Racheal Gonsalez, ROCCO  05/14/2024

## 2024-05-14 NOTE — ASSESSMENT & PLAN NOTE
Patient's (Body mass index is 27.56 kg/m².) indicates that they are overweight with health conditions that include dyslipidemias and GERD . Weight is worsening. BMI is is above average; BMI management plan is completed. We discussed low calorie, low carb based diet program, portion control, increasing exercise, pharmacologic options including saxenda, naltrexone, and an kesha-based approach such as [employer] Pal or Lose It.     I have instructed the patient to continue with pursuit of medical weight loss as a part of this program. Patient does meet criteria for use of anorectics at this time as BMI > 27 with coexisting, hyperlipidemia, and is not at treatment goal.     The current plan for this month includes:   - Continue current exercise efforts  - Continue to prioritize protein, fiber, and hydration.   - Restart saxenda 0.6mg, has several pens at home. Does not typically tolerate higher dosage.  - OK to increase naltrexone to 50mg bid, discussed this is off label use for obesity and should be limited to short term us (<3 months).   - Treatment goal 160-165lb     No

## 2024-06-20 ENCOUNTER — TELEMEDICINE (OUTPATIENT)
Dept: BARIATRICS/WEIGHT MGMT | Facility: CLINIC | Age: 44
End: 2024-06-20
Payer: COMMERCIAL

## 2024-06-20 VITALS — HEIGHT: 68 IN | WEIGHT: 174 LBS | BODY MASS INDEX: 26.37 KG/M2

## 2024-06-20 DIAGNOSIS — F43.21 GRIEF: ICD-10-CM

## 2024-06-20 DIAGNOSIS — E66.3 OVERWEIGHT (BMI 25.0-29.9): Primary | ICD-10-CM

## 2024-06-20 RX ORDER — GUANFACINE 1 MG/1
1 TABLET, EXTENDED RELEASE ORAL EVERY MORNING
COMMUNITY
Start: 2024-05-30

## 2024-06-20 RX ORDER — LISDEXAMFETAMINE DIMESYLATE 40 MG/1
40 CAPSULE ORAL EVERY MORNING
COMMUNITY
Start: 2024-04-22

## 2024-06-20 RX ORDER — NALTREXONE HYDROCHLORIDE 50 MG/1
100 TABLET, FILM COATED ORAL DAILY
Qty: 60 TABLET | Refills: 2 | Status: SHIPPED | OUTPATIENT
Start: 2024-06-20

## 2024-06-20 NOTE — ASSESSMENT & PLAN NOTE
Patient's (Body mass index is 26.85 kg/m².) indicates that they are overweight with health conditions that include dyslipidemias . Weight is improving with treatment. BMI is is above average; BMI management plan is completed. We discussed low calorie, low carb based diet program, portion control, increasing exercise, management of depression/anxiety/stress to control compensatory eating, pharmacologic options including saxenda, naltrexone, and an kesha-based approach such as [employer] Pal or Lose It.     I have instructed the patient to continue with pursuit of medical weight loss as a part of this program. Patient does meet criteria for use of anorectics at this time as BMI >25 with , hyperlipidemia, is not at treatment goal, and this medication is indicated for LONG TERM use for management of obesity.     The current plan for this month includes:   - Continue to prioritize protein, fiber, and hydration.   - Practice self care  - Continue current medications  - Discontinue lomaira, now taking vyvanse for ADHD  - treatment goal 165lb

## 2024-06-20 NOTE — ASSESSMENT & PLAN NOTE
Reports no appetite. Focus on self care- hydration, regular physical activity, therapy. Provided information for Psychiatric navigators.

## 2024-06-20 NOTE — PROGRESS NOTES
"     Office Note      Date: 2024  Patient Name: Mariel Fox  MRN: [id number]  : 1980    This service was conducted via [employer].  Patient is located at her home address.  Provider is located at her work address. The use of a video visit has been reviewed with the patient and informed consent has been obtained.  Subjective  Subjective     Chief Complaint  Obesity Management follow-up    Subjective          Mariel Fox presents to [facility] via telehealth for obesity management. LSG 2020 with Dr. Ojeda. Presurgery weight: 249 pounds, goal 165lb.    Patient is satisfied with weight loss progress. Appetite is well controlled. Her mother passed away 11 days ago, it was sudden and traumatic. States she has no appetite since that happened. She restarted saxenda at 0.6mg and did well for about 10 days then developed reflux, this has happened multiple times but she states that the saxenda is very helpful.   The patient is not exercising. Plans to start back with crossfit  in August. Plans to do some walking and jogging and swimming.     Objective   Start weight MWM: 186 pounds.    Total weight loss: -12 pounds/-6.4%  Change in weight since last visit: -4lb    Body mass index is 26.85 kg/m².   Measurements (in inches)     Ht 171.5 cm (67.5\")   Wt 78.9 kg (174 lb)   BMI 26.85 kg/m²       Result Review :                 Assessment and Plan    Diagnoses and all orders for this visit:    1. Overweight (BMI 25.0-29.9) (Primary)  Assessment & Plan:  Patient's (Body mass index is 26.85 kg/m².) indicates that they are overweight with health conditions that include dyslipidemias . Weight is improving with treatment. BMI is is above average; BMI management plan is completed. We discussed low calorie, low carb based diet program, portion control, increasing exercise, management of depression/anxiety/stress to control compensatory eating, pharmacologic options " including saxenda, naltrexone, and an kesha-based approach such as [employer] Pal or Lose It.     I have instructed the patient to continue with pursuit of medical weight loss as a part of this program. Patient does meet criteria for use of anorectics at this time as BMI >25 with , hyperlipidemia, is not at treatment goal, and this medication is indicated for LONG TERM use for management of obesity.     The current plan for this month includes:   - Continue to prioritize protein, fiber, and hydration.   - Practice self care  - Continue current medications  - Discontinue lomaira, now taking vyvanse for ADHD  - treatment goal 165lb      Orders:  -     naltrexone (DEPADE) 50 MG tablet; Take 2 tablets by mouth Daily.  Dispense: 60 tablet; Refill: 2    2. Grief  Assessment & Plan:  Reports no appetite. Focus on self care- hydration, regular physical activity, therapy. Provided information for [facility] navigators.             Follow Up   Return in about 2 months (around 8/20/2024) for Next scheduled follow up.  Patient was given instructions and counseling regarding her condition or for health maintenance advice. Please see specific information pulled into the AVS if appropriate.     ROCCO Calero

## 2024-07-24 ENCOUNTER — PRIOR AUTHORIZATION (OUTPATIENT)
Dept: BARIATRICS/WEIGHT MGMT | Facility: CLINIC | Age: 44
End: 2024-07-24
Payer: COMMERCIAL

## 2024-10-21 ENCOUNTER — OFFICE VISIT (OUTPATIENT)
Dept: BARIATRICS/WEIGHT MGMT | Facility: CLINIC | Age: 44
End: 2024-10-21
Payer: COMMERCIAL

## 2024-10-21 VITALS
BODY MASS INDEX: 28.01 KG/M2 | SYSTOLIC BLOOD PRESSURE: 132 MMHG | WEIGHT: 184.8 LBS | HEIGHT: 68 IN | DIASTOLIC BLOOD PRESSURE: 86 MMHG | HEART RATE: 70 BPM

## 2024-10-21 DIAGNOSIS — L40.50 PSORIATIC ARTHRITIS: ICD-10-CM

## 2024-10-21 DIAGNOSIS — R11.0 NAUSEA: ICD-10-CM

## 2024-10-21 DIAGNOSIS — E66.3 OVERWEIGHT (BMI 25.0-29.9): Primary | ICD-10-CM

## 2024-10-21 PROCEDURE — 99214 OFFICE O/P EST MOD 30 MIN: CPT | Performed by: NURSE PRACTITIONER

## 2024-10-21 PROCEDURE — 96372 THER/PROPH/DIAG INJ SC/IM: CPT | Performed by: NURSE PRACTITIONER

## 2024-10-21 RX ORDER — DEXTROAMPHETAMINE SACCHARATE, AMPHETAMINE ASPARTATE, DEXTROAMPHETAMINE SULFATE AND AMPHETAMINE SULFATE 1.25; 1.25; 1.25; 1.25 MG/1; MG/1; MG/1; MG/1
5 TABLET ORAL 2 TIMES DAILY
COMMUNITY
Start: 2024-09-29

## 2024-10-21 RX ORDER — ONDANSETRON 8 MG/1
8 TABLET, FILM COATED ORAL EVERY 8 HOURS PRN
Qty: 30 TABLET | Refills: 0 | Status: SHIPPED | OUTPATIENT
Start: 2024-10-21

## 2024-10-21 RX ORDER — NALTREXONE HYDROCHLORIDE 50 MG/1
100 TABLET, FILM COATED ORAL DAILY
Qty: 60 TABLET | Refills: 2 | Status: SHIPPED | OUTPATIENT
Start: 2024-10-21

## 2024-10-21 RX ADMIN — CYANOCOBALAMIN 1000 MCG: 1000 INJECTION, SOLUTION INTRAMUSCULAR; SUBCUTANEOUS at 12:21

## 2024-10-21 NOTE — ASSESSMENT & PLAN NOTE
Patient's (Body mass index is 28.52 kg/m².) indicates that they are overweight with health conditions that include dyslipidemias and GERD . Weight is worsening. BMI is above average; BMI management plan is completed. We discussed low calorie, low carb based diet program, portion control, increasing exercise, management of depression/anxiety/stress to control compensatory eating, pharmacologic options including saxenda, naltrexone, and an kesha-based approach such as [employer] Pal or Lose It.     I have instructed the patient to continue with pursuit of medical weight loss as a part of this program. Patient does meet criteria for use of anorectics at this time as BMI > 27 with coexisting, hyperlipidemia, impaired fasting glucose, and is not at treatment goal.     The current plan for this month includes:   - Continue to work on lifestyle behavioral changes  - bring back meal planning. Reviewed macronutrient goals.   - increase exercise- has [employer] fitness membership. Recommend 150 minutes a week of moderate intensity exercise with 2 resistance training sessions weekly.   - Continue saxenda 0.6mg, ok to continue zofran PRN for nausea.   - Continue naltrexone for cravings.   - Treatment goal 165lb.

## 2024-10-21 NOTE — PROGRESS NOTES
[facility]  [address]  [place] [address]     Office Note      Date: 10/21/2024  Patient Name: Mariel Fox  MRN: [id number]  : 1980  Subjective  Subjective     Chief Complaint  Obesity Management follow-up          Mariel Fox presents to [facility] for obesity management. LSG 2020 with Dr. Ojeda. Presurgery weight: 249 pounds, goal 165lb.  Patient is unsatisfied with weight loss progress. Appetite is poorly controlled. Has restarted saxenda 0.6mg+2 clicks and that has been helpful. In the past it has caused reflux if she takes it several days in a row. Can tell when she misses naltrexone, has more cravings.   Reports no side effects of prescribed medications today. The patient is not taking multivitamin and is not taking fish oil. The patient is not using a food journal.  The patient is exercising cardio, elliptical, walking 2 times a week for 30 minutes, strength training 2 times weekly for a bout 30 minutes with a FITT score of:    Frequency Intensity Time Strength Training   []   0, none []   0 []   0 []   0   [x]   1 (1-2x/week) []   1 (light) []   1 (<10 min) []   1 (1x/week)   []   2 (3-5x/week) [x]   2 (moderate) []   2 (10-20 min) [x]   2 (2x/week)   []   3 (daily) []   3 (moderately hard)  []   4 (very hard) [x]   3 (20-30 min)  []   4 (>30 min) []   3 (3-4x/week)       24 hour recall:   Breakfast: sausage mcmuffin w/ only one piece of the muffin, coffee w/ cream  Snack: banana, quest cookie   Lunch: chicken quesadilla, chips and salsa  Snack: smore  Dinner: sausage crescent casserole (sausage, aragon ,eggs, crescent roll)  3 Diet Mt Dews  Water Intake: 16 oz    Review of Systems   Constitutional:  Positive for appetite change and fatigue.   Eyes:  Negative for visual disturbance.   Cardiovascular:  Negative for chest pain and palpitations.   Gastrointestinal:  Positive for nausea and indigestion.  Negative for constipation.   Neurological:  Negative for light-headedness.       Current Outpatient Medications:     amphetamine-dextroamphetamine (ADDERALL) 5 MG tablet, Take 1 tablet by mouth 2 (Two) Times a Day., Disp: , Rfl:     Cimzia Starter Kit 6 X 200 MG/ML Prefilled Syringe Kit, , Disp: , Rfl:     diclofenac (VOLTAREN) 75 MG EC tablet, Take 1 tablet by mouth Daily., Disp: , Rfl:     DULoxetine (CYMBALTA) 60 MG capsule, Take 1 capsule by mouth Daily., Disp: , Rfl:     Insulin Pen Needle 32G X 4 MM misc, 1 dose Daily., Disp: 90 each, Rfl: 2    levonorgestrel (Mirena, 52 MG,) 20 MCG/24HR IUD, 1 each by Intrauterine route., Disp: , Rfl:     Liraglutide (SAXENDA) 18 MG/3ML injection pen, Inject 0.6 mg under the skin into the appropriate area as directed Daily., Disp: , Rfl:     multivitamin with minerals (Multivitamin Adults) tablet tablet, Take 1 tablet by mouth Daily., Disp: 30 tablet, Rfl: 2    naltrexone (DEPADE) 50 MG tablet, Take 2 tablets by mouth Daily., Disp: 60 tablet, Rfl: 2    omeprazole (priLOSEC) 40 MG capsule, Take 1 capsule by mouth Daily., Disp: 30 capsule, Rfl: 11    ondansetron (ZOFRAN) 8 MG tablet, Take 1 tablet by mouth Every 8 (Eight) Hours As Needed for Nausea. for nausea, Disp: 30 tablet, Rfl: 0    simvastatin (ZOCOR) 20 MG tablet, Take 1 tablet by mouth Every Night., Disp: , Rfl: 1    spironolactone (ALDACTONE) 100 MG tablet, Take 1 tablet by mouth Every 12 (Twelve) Hours., Disp: , Rfl:     Current Facility-Administered Medications:     cyanocobalamin injection 1,000 mcg, 1,000 mcg, Intramuscular, Q28 Days, Racheal Gonsalez APRN, 1,000 mcg at 10/21/24 1221    Objective   Start weight: 186 pounds.    Total Loss lb/%Loss of beginning body weight (BBW): -1.2lb/-0.64%  Change in weight since last visit: +10.8    Body mass index is 28.52 kg/m².   Body composition analysis completed and showed:   Body Fat %: 42.2    Measurements (in inches)  Waist Circumference: 36      Vital Signs:   BP  "132/86 (BP Location: Left arm, Patient Position: Sitting)   Pulse 70   Ht 171.5 cm (67.5\")   Wt 83.8 kg (184 lb 12.8 oz)   BMI 28.52 kg/m²     No LMP recorded.    Physical Exam   General appears stated age and normal appearance   HEENT PERRLA, EOM intact, and conjunctivae normal   Chest/lungs Normal rate, Regular rhythm, and Breathing is unlabored   Extremities without edema   Neuro Good historian and No focal deficit   Skin Warm, dry, intact   Psych normal behavior, normal thought content, and normal concentration     Result Review :                Assessment / Plan        Diagnoses and all orders for this visit:    1. Overweight (BMI 25.0-29.9) (Primary)  Overview:  LSG 2/4/2020 with Dr. Ojeda. Presurgery weight: 249 pounds, goal 165lb.    Macro goals:  Calories 2784-2724  Protein 98-123g  RDA: 60g  Net Carbs: 50-75g    AOM   hx 37: jitteriness but otherwise tolerated, full tab decreased fullness  Tolerated lomaira  35: insomnia (even with 1/2), no help  top: depression/low energy (DC 8/26/21)  10/21: 25tid and saxenda  Took met years ago for PCOS, nausea    Comorbidities: PCOS, HLD, depression/anxiety    Assessment & Plan:  Patient's (Body mass index is 28.52 kg/m².) indicates that they are overweight with health conditions that include dyslipidemias and GERD . Weight is worsening. BMI is above average; BMI management plan is completed. We discussed low calorie, low carb based diet program, portion control, increasing exercise, management of depression/anxiety/stress to control compensatory eating, pharmacologic options including saxenda, naltrexone, and an kesha-based approach such as [employer] Pal or Lose It.     I have instructed the patient to continue with pursuit of medical weight loss as a part of this program. Patient does meet criteria for use of anorectics at this time as BMI > 27 with coexisting, hyperlipidemia, impaired fasting glucose, and is not at treatment goal.     The current plan for this " month includes:   - Continue to work on lifestyle behavioral changes  - bring back meal planning. Reviewed macronutrient goals.   - increase exercise- has [employer] fitness membership. Recommend 150 minutes a week of moderate intensity exercise with 2 resistance training sessions weekly.   - Continue saxenda 0.6mg, ok to continue zofran PRN for nausea.   - Continue naltrexone for cravings.   - Treatment goal 165lb.         Orders:  -     naltrexone (DEPADE) 50 MG tablet; Take 2 tablets by mouth Daily.  Dispense: 60 tablet; Refill: 2    2. Psoriatic arthritis  Overview:  follows w/ Rheumatology, on Enbrel + prn Ibupfroen    Assessment & Plan:  Worsening with diet mt dew (3 a day), plans to eliminate and increase water.       3. Nausea  -     ondansetron (ZOFRAN) 8 MG tablet; Take 1 tablet by mouth Every 8 (Eight) Hours As Needed for Nausea. for nausea  Dispense: 30 tablet; Refill: 0          Follow Up   Return in about 1 month (around 11/21/2024) for Next scheduled follow up.  Patient was given instructions and counseling regarding her condition or for health maintenance advice. Please see specific information pulled into the AVS if appropriate.     Racheal Gonsalez, APRN  10/21/2024

## 2024-11-25 ENCOUNTER — PATIENT MESSAGE (OUTPATIENT)
Dept: BARIATRICS/WEIGHT MGMT | Facility: CLINIC | Age: 44
End: 2024-11-25
Payer: COMMERCIAL

## 2024-11-25 ENCOUNTER — TELEMEDICINE (OUTPATIENT)
Dept: BARIATRICS/WEIGHT MGMT | Facility: CLINIC | Age: 44
End: 2024-11-25
Payer: COMMERCIAL

## 2024-11-25 VITALS — BODY MASS INDEX: 28.04 KG/M2 | HEIGHT: 68 IN | WEIGHT: 185 LBS

## 2024-11-25 DIAGNOSIS — F98.8 ATTENTION DEFICIT DISORDER, UNSPECIFIED TYPE: ICD-10-CM

## 2024-11-25 DIAGNOSIS — E66.3 OVERWEIGHT (BMI 25.0-29.9): Primary | ICD-10-CM

## 2024-11-25 DIAGNOSIS — Z51.81 ENCOUNTER FOR THERAPEUTIC DRUG LEVEL MONITORING: ICD-10-CM

## 2024-11-25 RX ORDER — BUPROPION HYDROCHLORIDE 150 MG/1
150 TABLET ORAL EVERY MORNING
COMMUNITY
Start: 2024-11-07

## 2024-11-25 RX ORDER — ADALIMUMAB-BWWD 40 MG/.4ML
0.4 SOLUTION SUBCUTANEOUS
COMMUNITY
Start: 2024-10-30

## 2024-11-25 NOTE — ASSESSMENT & PLAN NOTE
Psychological condition is stable.  Meds managed by psychiatrist. Recently started wellbutrin instead of stimulant, unsure if it is helpful yet since her work schedule has been different.  Psychological condition  will be reassessed at the next regular appointment.

## 2024-11-25 NOTE — PROGRESS NOTES
Office Note      Date: 2024  Patient Name: Mariel Fox  MRN: [id number]  : 1980     Mode of Visit: Video  Location of patient: -OTHER-: in [place] in car  Location of provider: [facility]  You have chosen to receive care through a telehealth visit.  The patient has signed the video visit consent form.  The visit included audio and video interaction. No technical issues occurred during this visit.    Subjective  Subjective     Chief Complaint  Obesity Management follow-up    Subjective          Mariel Fox presents to [facility] via telehealth for obesity management. LSG 2020 with Dr. Ojeda. Presurgery weight: 249 pounds, goal 165lb.     Patient is satisfied with weight loss progress. Appetite is moderately controlled. Tolerating saxenda well, that has been helpful. Doing 0.6mg +2 clicks. Reports no side effects of prescribed medications today. Started wellbutrin XL for ADHD instead of a stimulant. She is interested in restarting diethylpropion which was helpful in the past until she developed a tolerance.     24 hour recall:  Breakfast: [employer] muffin  Lunch: 3/4 cheeseburger  Dinner: Pema Deens chicken breast, biscuit, 1 tbsp of the following corn, green beans, mashed potatoes, mac and cheese  Snacks: cheese and deli meat with crackers  Water: 35-45 oz  Beverages: sweet tea, Diet Mt Dew  Alcohol:none    The patient is exercising goes to gym 3 times per week, 30 minutes cardio, 20 minutes of free weights or machine weights.     Review of Systems   Constitutional:  Positive for appetite change. Negative for fatigue.   Eyes:  Negative for visual disturbance.   Cardiovascular:  Negative for chest pain and palpitations.   Gastrointestinal:  Negative for constipation and indigestion.   Endocrine: Negative for polydipsia, polyphagia and polyuria.   Neurological:  Negative for light-headedness.         Objective   Start weight: 186 pounds.   "  Total weight loss: -1 pounds/-0.53%  Change in weight since last visit: -1    Body mass index is 28.55 kg/m².   Measurements (in inches)     Ht 171.5 cm (67.5\")   Wt 83.9 kg (185 lb)   BMI 28.55 kg/m²       Result Review :                 Assessment and Plan    Diagnoses and all orders for this visit:    1. Overweight (BMI 25.0-29.9) (Primary)  Overview:  LSG 2/4/2020 with Dr. Ojeda. Presurgery weight: 249 pounds, goal 165lb.    Macro goals:  Calories 3675-0625  Protein 98-123g  RDA: 60g  Net Carbs: 50-75g    AOM   hx 37: jitteriness but otherwise tolerated, full tab decreased fullness  Tolerated lomaira  35: insomnia (even with 1/2), no help  top: depression/low energy (DC 8/26/21)  10/21: 25tid and saxenda  Took met years ago for PCOS, nausea    Comorbidities: PCOS, HLD, depression/anxiety    Assessment & Plan:  Patient's (Body mass index is 28.55 kg/m².) indicates that they are overweight with health conditions that include dyslipidemias and PCOS, dep/anx  . Weight is improving with treatment. BMI is above average; BMI management plan is completed. We discussed low calorie, low carb based diet program, portion control, increasing exercise, management of depression/anxiety/stress to control compensatory eating, pharmacologic options including wellbutrin/naltrexone, saxenda, and an kesha-based approach such as [employer] Pal or Lose It.     I have instructed the patient to continue with pursuit of medical weight loss as a part of this program. Patient does meet criteria for use of anorectics at this time as BMI > 27 with coexisting, hyperlipidemia, and is not at treatment goal.     The current plan for this month includes:   - Adjust exercise as discussed- plans to increase frequency from 3 days a week to 4 or 5 at the gym. Also encouraged that 10 minute bursts of exercise can be just as beneficial.   - Continue to prioritize protein, fiber, and hydration.   - Complete UDS then restart diethylpropion.   - " Treatment goal 165lb.         2. Attention deficit disorder, unspecified type  Assessment & Plan:  Psychological condition is stable.  Meds managed by psychiatrist. Recently started wellbutrin instead of stimulant, unsure if it is helpful yet since her work schedule has been different.  Psychological condition  will be reassessed at the next regular appointment.      3. Encounter for therapeutic drug level monitoring  -     Urine Drug Screen - Urine, Clean Catch; Future          Follow Up   Return in about 1 month (around 12/25/2024) for Next scheduled follow up.  Patient was given instructions and counseling regarding her condition or for health maintenance advice. Please see specific information pulled into the AVS if appropriate.     ROCCO Calero

## 2024-11-25 NOTE — ASSESSMENT & PLAN NOTE
Patient's (Body mass index is 28.55 kg/m².) indicates that they are overweight with health conditions that include dyslipidemias and PCOS, dep/anx  . Weight is improving with treatment. BMI is above average; BMI management plan is completed. We discussed low calorie, low carb based diet program, portion control, increasing exercise, management of depression/anxiety/stress to control compensatory eating, pharmacologic options including wellbutrin/naltrexone, saxenda, and an kesha-based approach such as [employer] Pal or Lose It.     I have instructed the patient to continue with pursuit of medical weight loss as a part of this program. Patient does meet criteria for use of anorectics at this time as BMI > 27 with coexisting, hyperlipidemia, and is not at treatment goal.     The current plan for this month includes:   - Adjust exercise as discussed- plans to increase frequency from 3 days a week to 4 or 5 at the gym. Also encouraged that 10 minute bursts of exercise can be just as beneficial.   - Continue to prioritize protein, fiber, and hydration.   - Complete UDS then restart diethylpropion.   - Treatment goal 165lb.

## 2024-11-27 RX ORDER — DIETHYLPROPION HYDROCHLORIDE 25 MG/1
TABLET ORAL
Qty: 30 EACH | Refills: 1 | Status: SHIPPED | OUTPATIENT
Start: 2024-11-27

## 2025-01-04 DIAGNOSIS — E66.3 OVERWEIGHT (BMI 25.0-29.9): ICD-10-CM

## 2025-01-09 RX ORDER — NALTREXONE HYDROCHLORIDE 50 MG/1
100 TABLET, FILM COATED ORAL DAILY
Qty: 60 TABLET | Refills: 0 | Status: SHIPPED | OUTPATIENT
Start: 2025-01-09

## 2025-01-28 ENCOUNTER — OFFICE VISIT (OUTPATIENT)
Dept: CARDIOLOGY | Facility: CLINIC | Age: 45
End: 2025-01-28
Payer: COMMERCIAL

## 2025-01-28 VITALS
HEART RATE: 76 BPM | HEIGHT: 67 IN | WEIGHT: 189 LBS | BODY MASS INDEX: 29.66 KG/M2 | SYSTOLIC BLOOD PRESSURE: 128 MMHG | DIASTOLIC BLOOD PRESSURE: 78 MMHG

## 2025-01-28 DIAGNOSIS — R07.2 PRECORDIAL PAIN: Primary | ICD-10-CM

## 2025-01-28 DIAGNOSIS — E78.2 MIXED HYPERLIPIDEMIA: ICD-10-CM

## 2025-01-28 DIAGNOSIS — R93.1 ELEVATED CORONARY ARTERY CALCIUM SCORE: ICD-10-CM

## 2025-01-28 PROCEDURE — 99204 OFFICE O/P NEW MOD 45 MIN: CPT | Performed by: INTERNAL MEDICINE

## 2025-01-28 PROCEDURE — 93000 ELECTROCARDIOGRAM COMPLETE: CPT | Performed by: INTERNAL MEDICINE

## 2025-01-28 RX ORDER — OMEPRAZOLE 40 MG/1
1 CAPSULE, DELAYED RELEASE ORAL DAILY
COMMUNITY
Start: 2025-01-07

## 2025-01-28 RX ORDER — ATORVASTATIN CALCIUM 80 MG/1
80 TABLET, FILM COATED ORAL
COMMUNITY
Start: 2025-01-07

## 2025-01-28 RX ORDER — SEMAGLUTIDE 0.5 MG/.5ML
INJECTION, SOLUTION SUBCUTANEOUS
COMMUNITY
Start: 2025-01-17

## 2025-01-28 RX ORDER — ASPIRIN 81 MG/1
81 TABLET ORAL DAILY
Start: 2025-01-28

## 2025-01-28 RX ORDER — DEXTROAMPHETAMINE SACCHARATE, AMPHETAMINE ASPARTATE, DEXTROAMPHETAMINE SULFATE AND AMPHETAMINE SULFATE 1.25; 1.25; 1.25; 1.25 MG/1; MG/1; MG/1; MG/1
TABLET ORAL
COMMUNITY
Start: 2024-12-10

## 2025-01-28 NOTE — PROGRESS NOTES
Chief Complaint  Establish Care, Hyperlipidemia, and abnormal calcium score    Subjective            Mariel Fox presents to [facility] CARDIOLOGY  Hyperlipidemia  Associated symptoms include chest pain.       44-year-old female.  She has no personal cardiac history.  Her father  of a heart attack at age 66.  She has had some chest pain intermittently but it has been somewhat random and not very frequent.  It is not exertional.  Her primary care did a coronary calcium score that was 121 and referred to cardiology for further evaluation.  She was changed to high-dose Lipitor a few months ago and does take low-dose aspirin.  She stays relatively active.  She has lost a lot of weight over the years after having bariatric surgery about 5 years ago.    PMH  Past Medical History:   Diagnosis Date    Anxiety and depression     Cancer     melanoma    Chronic back pain     last steroid injection 2017    Dyspnea on exertion     Endometriosis     Fatigue     Fatty liver     follows w/ GI ([place]), Mom w/ hx NUNES Cirrhosis, denies prior liver bx    GERD (gastroesophageal reflux disease)     Hyperlipidemia     Hypertension     Melanoma in situ 2005    (R) forearm, resected, no subsequet tx, follows w/ derm    MTHFR mutation     hx miscarriage x 2, no hx DVT/PE, takes ASA81 mg qod    Obesity     PCOS (polycystic ovarian syndrome)     on Spironolactone    Presence of dental bridge     permanent retainer bottom    Psoriatic arthritis     follows w/ Rheumatology ([place]), on Enbrel/Gabapentin + prn Ibupfroen         SURGICALHX  Past Surgical History:   Procedure Laterality Date    D & C HYSTEROSCOPY      x 5    ENDOSCOPY N/A 2020    Procedure: ESOPHAGOGASTRODUODENOSCOPY;  Surgeon: Shayy Ojeda MD;  Location: [facility];  Service: Bariatric;  Laterality: N/A;    ENDOSCOPY N/A 5/15/2023    Procedure: ESOPHAGOGASTRODUODENOSCOPY WITH BIOPSY;  Surgeon: Shayy Ojeda MD;   Location:  [facility];  Service: General;  Laterality: N/A;    GASTRIC SLEEVE LAPAROSCOPIC N/A 02/04/2020    Procedure: GASTRIC SLEEVE LAPAROSCOPIC;  Surgeon: Shayy Ojeda MD;  Location:  [facility];  Service: Bariatric;  Laterality: N/A;    INTRAUTERINE DEVICE INSERTION      in place still    PLANTAR FASCIA SURGERY Left 01/19/2021    SALPINGECTOMY Left 2013    ectopic pregnancy    SKIN SURGERY      wide local excision right forearm melanoma in situ, also another for dysplastic nevus.    TARSAL TUNNEL RELEASE Right 2015    (R) foot    TONSILLECTOMY  1990    WISDOM TOOTH EXTRACTION          SOC  Social History     Socioeconomic History    Marital status:    Tobacco Use    Smoking status: Never    Smokeless tobacco: Never   Vaping Use    Vaping status: Never Used   Substance and Sexual Activity    Alcohol use: No    Drug use: No    Sexual activity: Defer         FAMHX  Family History   Problem Relation Age of Onset    Hypertension Mother     Melanoma Mother 45    Cirrhosis Mother         NUNES    Hypertension Father     Squamous cell carcinoma Father 60    Hypertension Brother     Stroke Maternal Grandmother     Heart attack Maternal Grandfather     Lung cancer Paternal Aunt           ALLERGY  Allergies   Allergen Reactions    Adhesive Tape Itching and Rash     Longer period of time irritation-     Paper tape is okay         MEDSCURRENT    Current Outpatient Medications:     amphetamine-dextroamphetamine (ADDERALL) 5 MG tablet, TAKE ONE TABLET BY MOUTH TWICE DAILY (ONE TABLET AT 8AM & ONE TABLET AT 1PM), Disp: , Rfl:     atorvastatin (LIPITOR) 80 MG tablet, Take 1 tablet by mouth every night at bedtime., Disp: , Rfl:     diclofenac (VOLTAREN) 75 MG EC tablet, Take 1 tablet by mouth Daily., Disp: , Rfl:     DULoxetine (CYMBALTA) 60 MG capsule, Take 1 capsule by mouth Daily., Disp: , Rfl:     Hadlima PushTouch 40 MG/0.4ML solution auto-injector, Inject 0.4 mL under the skin into the appropriate area  "as directed., Disp: , Rfl:     Insulin Pen Needle 32G X 4 MM misc, 1 dose Daily., Disp: 90 each, Rfl: 2    levonorgestrel (Mirena, 52 MG,) 20 MCG/24HR IUD, 1 each by Intrauterine route., Disp: , Rfl:     multivitamin with minerals (Multivitamin Adults) tablet tablet, Take 1 tablet by mouth Daily., Disp: 30 tablet, Rfl: 2    naltrexone (DEPADE) 50 MG tablet, TAKE TWO TABLETS BY MOUTH EVERY DAY, Disp: 60 tablet, Rfl: 0    omeprazole (priLOSEC) 40 MG capsule, Take 1 capsule by mouth Daily., Disp: , Rfl:     ondansetron (ZOFRAN) 8 MG tablet, Take 1 tablet by mouth Every 8 (Eight) Hours As Needed for Nausea. for nausea, Disp: 30 tablet, Rfl: 0    spironolactone (ALDACTONE) 100 MG tablet, Take 1 tablet by mouth Every 12 (Twelve) Hours., Disp: , Rfl:     Wegovy 0.5 MG/0.5ML solution auto-injector, INJECT 0.5ML SUBCUTANEOUSLY EVERY WEEK, Disp: , Rfl:     aspirin 81 MG EC tablet, Take 1 tablet by mouth Daily., Disp: , Rfl:     buPROPion XL (WELLBUTRIN XL) 150 MG 24 hr tablet, Take 1 tablet by mouth Every Morning., Disp: , Rfl:     Diethylpropion HCl 25 MG tablet, Take one tablet by mouth at 11am., Disp: 30 each, Rfl: 1    Current Facility-Administered Medications:     cyanocobalamin injection 1,000 mcg, 1,000 mcg, Intramuscular, Q28 Days, Racheal Gonsalez APRN, 1,000 mcg at 10/21/24 1221      Review of Systems   Constitutional: Negative.   HENT: Negative.     Eyes: Negative.    Cardiovascular:  Positive for chest pain.   Respiratory: Negative.     Endocrine: Negative.    Hematologic/Lymphatic: Negative.    Skin: Negative.    Musculoskeletal: Negative.    Gastrointestinal: Negative.    Genitourinary: Negative.    Neurological: Negative.    Psychiatric/Behavioral: Negative.          Objective     /78   Pulse 76   Ht 170.2 cm (67\")   Wt 85.7 kg (189 lb)   BMI 29.60 kg/m²       General Appearance:   well developed  well nourished  HENT:   oropharynx moist  lips not cyanotic  Neck:  thyroid not " enlarged  supple  Respiratory:  no respiratory distress  normal breath sounds  no rales  Cardiovascular:  no jugular venous distention  regular rhythm  apical impulse normal  S1 normal, S2 normal  no S3, no S4   no murmur  no rub, no thrill  carotid pulses normal; no bruit  pedal pulses normal  lower extremity edema: none    Musculoskeletal:  no clubbing of fingers.   normocephalic, head atraumatic  Skin:   warm, dry  Psychiatric:  judgement and insight appropriate  normal mood and affect      Result Review :             Data reviewed : Primary care records reviewed, labs reviewed        ECG 12 Lead    Date/Time: 1/28/2025 11:42 AM  Performed by: KATELIN Lake MD    Authorized by: KATELIN Lake MD  Comparison: not compared with previous ECG   Previous ECG: no previous ECG available  Rhythm: sinus rhythm  Conduction: conduction normal  ST Segments: ST segments normal  T Waves: T waves normal  QRS axis: normal  Other: no other findings    Clinical impression: normal ECG                    Assessment and Plan        ASSESSMENT:  Encounter Diagnoses   Name Primary?    Precordial pain Yes    Mixed hyperlipidemia     Elevated coronary artery calcium score          PLAN:    1.  Precordial pain-not entirely typical for angina.  Relatively infrequent and random.  Stress imaging will be scheduled to evaluate for ischemia given the elevated calcium score.  2.  Mixed hyperlipidemia-agree with high potency statin therapy.  Will send a repeat lipid panel when she comes in for stress test  3.  Elevated coronary artery calcium score-discussed at length with the patient.  I recommend ongoing aspirin and statin therapy for prevention.    We will discuss the diagnostic results when available, she is agreeable with this plan, questions were answered          Patient was given instructions and counseling regarding her condition or for health maintenance advice. Please see specific information pulled into the AVS if appropriate.              C Ashu Lake MD  1/28/2025    11:39 EST

## 2025-01-30 ENCOUNTER — OFFICE VISIT (OUTPATIENT)
Dept: BARIATRICS/WEIGHT MGMT | Facility: CLINIC | Age: 45
End: 2025-01-30
Payer: COMMERCIAL

## 2025-01-30 VITALS
HEART RATE: 82 BPM | RESPIRATION RATE: 16 BRPM | BODY MASS INDEX: 27.65 KG/M2 | DIASTOLIC BLOOD PRESSURE: 72 MMHG | WEIGHT: 182.4 LBS | SYSTOLIC BLOOD PRESSURE: 116 MMHG | TEMPERATURE: 97.8 F | HEIGHT: 68 IN

## 2025-01-30 DIAGNOSIS — Z13.21 MALNUTRITION SCREEN: ICD-10-CM

## 2025-01-30 DIAGNOSIS — K91.2 POSTGASTRECTOMY MALABSORPTION: ICD-10-CM

## 2025-01-30 DIAGNOSIS — Z51.81 THERAPEUTIC DRUG MONITORING: ICD-10-CM

## 2025-01-30 DIAGNOSIS — E66.3 OVERWEIGHT (BMI 25.0-29.9): Primary | ICD-10-CM

## 2025-01-30 DIAGNOSIS — Z13.0 SCREENING, IRON DEFICIENCY ANEMIA: ICD-10-CM

## 2025-01-30 DIAGNOSIS — R53.83 FATIGUE, UNSPECIFIED TYPE: ICD-10-CM

## 2025-01-30 DIAGNOSIS — E55.9 HYPOVITAMINOSIS D: ICD-10-CM

## 2025-01-30 DIAGNOSIS — Z90.3 POSTGASTRECTOMY MALABSORPTION: ICD-10-CM

## 2025-01-30 NOTE — PROGRESS NOTES
[facility] Bariatric Surgery  [address]    [facility] [address] [phone]        Patient Name:  Mariel Fox.  :  1980      Date of Visit: 2025      Reason for Visit:   5 years postop      HPI: Mariel Fox is a 44 y.o. female s/p LSG 20 w/ Dr. Ojeda     She is on 0.5 mg Wegovy.  Occasional nausea.  Has been on 5 weeks.  Receiving from PCP.  Was eugenio Springer, but hard to make the appointments.  Admits she isn't a good tracker.    Doing well.  No issues/concerns. Denies dysphagia, reflux, nausea, vomiting, and abdominal pain.  Getting 60 g prot/day.  Drinking 64 fluid oz/day.  Stopped drinking Diet Mountain Dew.  Last labs revealed  no vitamin deficiencies. Taking MVI.  On Omeprazole .  Exercise: goes to gym 3-4 days per week for [employer] circuit, prefers to walk/run outside.     Presurgery weight: 249 pounds.  Today's weight is 82.7 kg (182 lb 6.4 oz) pounds, today's  Body mass index is 28.15 kg/m²., and weight loss since surgery is 67 pounds.      Past Medical History:   Diagnosis Date    Anxiety and depression     Cancer     melanoma    Chronic back pain     last steroid injection     Dyspnea on exertion     Endometriosis     Fatigue     Fatty liver     follows w/ GI ([place]), Mom w/ hx NUNES Cirrhosis, denies prior liver bx    GERD (gastroesophageal reflux disease)     Hyperlipidemia     Hypertension     Melanoma in situ 2005    (R) forearm, resected, no subsequet tx, follows w/ derm    MTHFR mutation     hx miscarriage x 2, no hx DVT/PE, takes ASA81 mg qod    Obesity     PCOS (polycystic ovarian syndrome)     on Spironolactone    Presence of dental bridge     permanent retainer bottom    Psoriatic arthritis     follows w/ Rheumatology ([place]), on Enbrel/Gabapentin + prn Ibupfroen     Past Surgical History:   Procedure Laterality Date    D & C HYSTEROSCOPY      x 5    ENDOSCOPY N/A 2020    Procedure:  ESOPHAGOGASTRODUODENOSCOPY;  Surgeon: Shayy Ojeda MD;  Location:  [facility];  Service: Bariatric;  Laterality: N/A;    ENDOSCOPY N/A 5/15/2023    Procedure: ESOPHAGOGASTRODUODENOSCOPY WITH BIOPSY;  Surgeon: Shayy Ojeda MD;  Location:  [facility];  Service: General;  Laterality: N/A;    GASTRIC SLEEVE LAPAROSCOPIC N/A 02/04/2020    Procedure: GASTRIC SLEEVE LAPAROSCOPIC;  Surgeon: Shayy Ojeda MD;  Location:  [facility];  Service: Bariatric;  Laterality: N/A;    INTRAUTERINE DEVICE INSERTION      in place still    PLANTAR FASCIA SURGERY Left 01/19/2021    SALPINGECTOMY Left 2013    ectopic pregnancy    SKIN SURGERY      wide local excision right forearm melanoma in situ, also another for dysplastic nevus.    TARSAL TUNNEL RELEASE Right 2015    (R) foot    TONSILLECTOMY  1990    WISDOM TOOTH EXTRACTION       Outpatient Medications Marked as Taking for the 1/30/25 encounter (Office Visit) with Shayy Ojeda MD   Medication Sig Dispense Refill    amphetamine-dextroamphetamine (ADDERALL) 5 MG tablet TAKE ONE TABLET BY MOUTH TWICE DAILY (ONE TABLET AT 8AM & ONE TABLET AT 1PM)      aspirin 81 MG EC tablet Take 1 tablet by mouth Daily.      atorvastatin (LIPITOR) 80 MG tablet Take 1 tablet by mouth every night at bedtime.      diclofenac (VOLTAREN) 75 MG EC tablet Take 1 tablet by mouth Daily.      DULoxetine (CYMBALTA) 60 MG capsule Take 1 capsule by mouth Daily.      Hadlima PushTouch 40 MG/0.4ML solution auto-injector Inject 0.4 mL under the skin into the appropriate area as directed.      Insulin Pen Needle 32G X 4 MM misc 1 dose Daily. 90 each 2    levonorgestrel (Mirena, 52 MG,) 20 MCG/24HR IUD 1 each by Intrauterine route.      multivitamin with minerals (Multivitamin Adults) tablet tablet Take 1 tablet by mouth Daily. 30 tablet 2    naltrexone (DEPADE) 50 MG tablet TAKE TWO TABLETS BY MOUTH EVERY DAY 60 tablet 0    omeprazole (priLOSEC) 40 MG capsule Take 1 capsule by mouth  "Daily.      ondansetron (ZOFRAN) 8 MG tablet Take 1 tablet by mouth Every 8 (Eight) Hours As Needed for Nausea. for nausea 30 tablet 0    spironolactone (ALDACTONE) 100 MG tablet Take 1 tablet by mouth Every 12 (Twelve) Hours.      Wegovy 0.5 MG/0.5ML solution auto-injector INJECT 0.5ML SUBCUTANEOUSLY EVERY WEEK      [DISCONTINUED] buPROPion XL (WELLBUTRIN XL) 150 MG 24 hr tablet Take 1 tablet by mouth Every Morning.      [DISCONTINUED] Diethylpropion HCl 25 MG tablet Take one tablet by mouth at 11am. 30 each 1     Current Facility-Administered Medications for the 1/30/25 encounter (Office Visit) with Shayy Ojeda MD   Medication Dose Route Frequency Provider Last Rate Last Admin    cyanocobalamin injection 1,000 mcg  1,000 mcg Intramuscular Q28 Days Racheal Gonsalez APRN   1,000 mcg at 10/21/24 1221       Allergies   Allergen Reactions    Adhesive Tape Itching and Rash     Longer period of time irritation-     Paper tape is okay        Social History     Socioeconomic History    Marital status:    Tobacco Use    Smoking status: Never    Smokeless tobacco: Never   Vaping Use    Vaping status: Never Used   Substance and Sexual Activity    Alcohol use: No    Drug use: No    Sexual activity: Defer       /72 (BP Location: Left arm, Patient Position: Sitting)   Pulse 82   Temp 97.8 °F (36.6 °C)   Resp 16   Ht 171.5 cm (67.5\")   Wt 82.7 kg (182 lb 6.4 oz)   BMI 28.15 kg/m²     Physical Exam  Constitutional:       General: She is not in acute distress.     Appearance: She is well-developed. She is not diaphoretic.   HENT:      Head: Normocephalic and atraumatic.      Mouth/Throat:      Pharynx: No oropharyngeal exudate.   Eyes:      Conjunctiva/sclera: Conjunctivae normal.      Pupils: Pupils are equal, round, and reactive to light.   Pulmonary:      Effort: Pulmonary effort is normal. No respiratory distress.   Abdominal:      General: There is no distension.      Palpations: Abdomen is soft. "   Skin:     General: Skin is warm and dry.      Coloration: Skin is not pale.   Neurological:      Mental Status: She is alert and oriented to person, place, and time.      Cranial Nerves: No cranial nerve deficit.   Psychiatric:         Behavior: Behavior normal.         Thought Content: Thought content normal.           Assessment:  5 years s/p LSG 2/4/20 w/ Dr. Ojeda     ICD-10-CM ICD-9-CM   1. Overweight (BMI 25.0-29.9)  E66.3 278.02   2. Fatigue, unspecified type  R53.83 780.79   3. Postgastrectomy malabsorption  K91.2 579.3    Z90.3    4. Malnutrition screen  Z13.21 V77.2   5. Hypovitaminosis D  E55.9 268.9   6. Screening, iron deficiency anemia  Z13.0 V78.0   7. Therapeutic drug monitoring  Z51.81 V58.83              Plan:  Doing well. Continue w/ good food choices and healthy habits.  Continue protein >70g/day.  Continue routine exercise.  Routine bariatric labs ordered.  Continue vitamins w/ adjustments pending lab results.  Call w/ problems/concerns.     Ok to take GLP-1 per PCP, reminded to prioritize protein.  Rec [id number] ulises/day on workout days, ok for 1300 on non workout days.    The patient was instructed to follow up in 6 months, sooner if needed.    I spent 30 minutes caring for Mariel on this date of service. This time includes time spent by me in the following activities: preparing for the visit, reviewing tests, performing a medically appropriate examination and/or evaluation, counseling and educating the patient/family/caregiver, referring and communicating with other health care professionals, documenting information in the medical record, independently interpreting results and communicating that information with the patient/family/caregiver, care coordination, ordering medications, ordering test(s), ordering procedure(s), obtaining a separately obtained history, and reviewing a separately obtained history      Shayy Ojeda MD

## 2025-02-05 LAB
25(OH)D3+25(OH)D2 SERPL-MCNC: 40 NG/ML (ref 30–100)
ALBUMIN SERPL-MCNC: 4.8 G/DL (ref 3.9–4.9)
ALP SERPL-CCNC: 98 IU/L (ref 44–121)
ALT SERPL-CCNC: 27 IU/L (ref 0–32)
AST SERPL-CCNC: 29 IU/L (ref 0–40)
BASOPHILS # BLD AUTO: 0.1 X10E3/UL (ref 0–0.2)
BASOPHILS NFR BLD AUTO: 1 %
BILIRUB SERPL-MCNC: 1.7 MG/DL (ref 0–1.2)
BUN SERPL-MCNC: 10 MG/DL (ref 6–24)
BUN/CREAT SERPL: 11 (ref 9–23)
CALCIUM SERPL-MCNC: 9.6 MG/DL (ref 8.7–10.2)
CHLORIDE SERPL-SCNC: 99 MMOL/L (ref 96–106)
CO2 SERPL-SCNC: 25 MMOL/L (ref 20–29)
CREAT SERPL-MCNC: 0.89 MG/DL (ref 0.57–1)
EGFRCR SERPLBLD CKD-EPI 2021: 82 ML/MIN/1.73
EOSINOPHIL # BLD AUTO: 0.1 X10E3/UL (ref 0–0.4)
EOSINOPHIL NFR BLD AUTO: 1 %
ERYTHROCYTE [DISTWIDTH] IN BLOOD BY AUTOMATED COUNT: 12.3 % (ref 11.7–15.4)
FERRITIN SERPL-MCNC: 60 NG/ML (ref 15–150)
FOLATE SERPL-MCNC: 6 NG/ML
GLOBULIN SER CALC-MCNC: 2.7 G/DL (ref 1.5–4.5)
GLUCOSE SERPL-MCNC: 87 MG/DL (ref 70–99)
HCT VFR BLD AUTO: 42.6 % (ref 34–46.6)
HGB BLD-MCNC: 13.9 G/DL (ref 11.1–15.9)
IMM GRANULOCYTES # BLD AUTO: 0 X10E3/UL (ref 0–0.1)
IMM GRANULOCYTES NFR BLD AUTO: 0 %
IRON SERPL-MCNC: 131 UG/DL (ref 27–159)
LYMPHOCYTES # BLD AUTO: 3.3 X10E3/UL (ref 0.7–3.1)
LYMPHOCYTES NFR BLD AUTO: 42 %
MCH RBC QN AUTO: 31.2 PG (ref 26.6–33)
MCHC RBC AUTO-ENTMCNC: 32.6 G/DL (ref 31.5–35.7)
MCV RBC AUTO: 96 FL (ref 79–97)
METHYLMALONATE SERPL-SCNC: 139 NMOL/L (ref 0–378)
MONOCYTES # BLD AUTO: 0.6 X10E3/UL (ref 0.1–0.9)
MONOCYTES NFR BLD AUTO: 7 %
NEUTROPHILS # BLD AUTO: 3.9 X10E3/UL (ref 1.4–7)
NEUTROPHILS NFR BLD AUTO: 49 %
PLATELET # BLD AUTO: 323 X10E3/UL (ref 150–450)
POTASSIUM SERPL-SCNC: 4.4 MMOL/L (ref 3.5–5.2)
PREALB SERPL-MCNC: 28 MG/DL (ref 12–34)
PROT SERPL-MCNC: 7.5 G/DL (ref 6–8.5)
RBC # BLD AUTO: 4.45 X10E6/UL (ref 3.77–5.28)
SODIUM SERPL-SCNC: 138 MMOL/L (ref 134–144)
VIT B1 BLD-SCNC: 142.4 NMOL/L (ref 66.5–200)
WBC # BLD AUTO: 8 X10E3/UL (ref 3.4–10.8)

## 2025-02-07 ENCOUNTER — OUTSIDE FACILITY SERVICE (OUTPATIENT)
Dept: CARDIOLOGY | Facility: CLINIC | Age: 45
End: 2025-02-07
Payer: COMMERCIAL

## 2025-02-07 PROCEDURE — 78452 HT MUSCLE IMAGE SPECT MULT: CPT | Performed by: INTERNAL MEDICINE

## 2025-02-07 PROCEDURE — 93018 CV STRESS TEST I&R ONLY: CPT | Performed by: INTERNAL MEDICINE

## 2025-02-11 DIAGNOSIS — R07.2 PRECORDIAL PAIN: ICD-10-CM

## 2025-02-12 ENCOUNTER — TELEPHONE (OUTPATIENT)
Dept: BARIATRICS/WEIGHT MGMT | Facility: CLINIC | Age: 45
End: 2025-02-12

## 2025-02-12 NOTE — TELEPHONE ENCOUNTER
[facility] received a fax that requires your attention. The document has been indexed to the patient’s chart for your review.      Reason for sending:  REVIEW     Documents Description:  [facility]    Name of Sender: [facility]    Date Indexed: 02/12/25    Notes (if needed):

## 2025-06-15 DIAGNOSIS — E66.3 OVERWEIGHT (BMI 25.0-29.9): ICD-10-CM

## 2025-06-16 RX ORDER — NALTREXONE HYDROCHLORIDE 50 MG/1
100 TABLET, FILM COATED ORAL DAILY
Qty: 60 TABLET | Refills: 1 | OUTPATIENT
Start: 2025-06-16

## 2025-07-22 DIAGNOSIS — E66.3 OVERWEIGHT (BMI 25.0-29.9): ICD-10-CM

## 2025-07-22 RX ORDER — NALTREXONE HYDROCHLORIDE 50 MG/1
100 TABLET, FILM COATED ORAL DAILY
Qty: 60 TABLET | Refills: 1 | OUTPATIENT
Start: 2025-07-22

## (undated) DEVICE — SKIN AFFIX SURG ADHESIVE 72/CS 0.55ML: Brand: MEDLINE

## (undated) DEVICE — ENDOPATH 5MM ENDOSCOPIC BLUNT TIP DISSECTORS (12 POUCHES CONTAINING 3 DISSECTORS EACH): Brand: ENDOPATH

## (undated) DEVICE — POWER SHELL: Brand: SIGNIA

## (undated) DEVICE — ENDOPATH XCEL BLADELESS TROCARS WITH STABILITY SLEEVES: Brand: ENDOPATH XCEL

## (undated) DEVICE — UNDRPD COMFRT GLD DRYPAD 36X57IN

## (undated) DEVICE — GLV SURG SENSICARE PI MIC PF SZ6 LF STRL

## (undated) DEVICE — Device

## (undated) DEVICE — Device: Brand: DEFENDO AIR/WATER/SUCTION AND BIOPSY VALVE

## (undated) DEVICE — [HIGH FLOW INSUFFLATOR,  DO NOT USE IF PACKAGE IS DAMAGED,  KEEP DRY,  KEEP AWAY FROM SUNLIGHT,  PROTECT FROM HEAT AND RADIOACTIVE SOURCES.]: Brand: PNEUMOSURE

## (undated) DEVICE — SHT AIR TRANSFR COMFRT GLIDE LT LAT 40X80IN

## (undated) DEVICE — GLV SURG DERMASSURE GRN LF PF 7.0

## (undated) DEVICE — CLMP STD 22CM DISP

## (undated) DEVICE — FLTR PLUMEPORT LAP W/CONN STRL

## (undated) DEVICE — ENDOPATH XCEL UNIVERSAL TROCAR STABLILITY SLEEVES: Brand: ENDOPATH XCEL

## (undated) DEVICE — PK BARIATRIC 10

## (undated) DEVICE — COVER,LIGHT HANDLE,FLX,1/PK: Brand: MEDLINE INDUSTRIES, INC.

## (undated) DEVICE — APPL CHLORAPREP W/TINT 26ML BLU

## (undated) DEVICE — SINGLE-USE BIOPSY FORCEPS: Brand: RADIAL JAW 4

## (undated) DEVICE — GLV SURG SENSICARE MICRO PF LF 6.5 STRL

## (undated) DEVICE — CONTN GRAD MEAS TRIANG 32OZ BLK

## (undated) DEVICE — INTENDED USE FOR SURGICAL MARKING ON INTACT SKIN, ALSO PROVIDES A PERMANENT METHOD OF IDENTIFYING OBJECTS IN THE OPERATING ROOM: Brand: WRITESITE® REGULAR TIP SKIN MARKER

## (undated) DEVICE — MARYLAND JAW LAPAROSCOPIC SEALER/DIVIDER COATED: Brand: LIGASURE

## (undated) DEVICE — GOWN,NON-REINFORCED,SIRUS,SET IN SLV,XXL: Brand: MEDLINE

## (undated) DEVICE — TISSUE RETRIEVAL SYSTEM: Brand: INZII RETRIEVAL SYSTEM